# Patient Record
Sex: FEMALE | Race: WHITE | NOT HISPANIC OR LATINO | Employment: OTHER | URBAN - NONMETROPOLITAN AREA
[De-identification: names, ages, dates, MRNs, and addresses within clinical notes are randomized per-mention and may not be internally consistent; named-entity substitution may affect disease eponyms.]

---

## 2017-01-17 ENCOUNTER — OFFICE VISIT (OUTPATIENT)
Dept: OBSTETRICS AND GYNECOLOGY | Facility: CLINIC | Age: 62
End: 2017-01-17

## 2017-01-17 VITALS
SYSTOLIC BLOOD PRESSURE: 126 MMHG | DIASTOLIC BLOOD PRESSURE: 72 MMHG | BODY MASS INDEX: 23.39 KG/M2 | HEIGHT: 64 IN | WEIGHT: 137 LBS

## 2017-01-17 DIAGNOSIS — N89.8 VAGINAL DRYNESS: ICD-10-CM

## 2017-01-17 DIAGNOSIS — Z01.419 WELL WOMAN EXAM WITH ROUTINE GYNECOLOGICAL EXAM: Primary | ICD-10-CM

## 2017-01-17 DIAGNOSIS — C50.912 MALIGNANT NEOPLASM OF LEFT FEMALE BREAST, UNSPECIFIED SITE OF BREAST: ICD-10-CM

## 2017-01-17 DIAGNOSIS — Z13.79 OTHER GENETIC SCREENING: ICD-10-CM

## 2017-01-17 PROCEDURE — 99396 PREV VISIT EST AGE 40-64: CPT | Performed by: NURSE PRACTITIONER

## 2017-01-17 PROCEDURE — G0123 SCREEN CERV/VAG THIN LAYER: HCPCS | Performed by: NURSE PRACTITIONER

## 2017-01-17 RX ORDER — PANTOPRAZOLE SODIUM 20 MG/1
20 TABLET, DELAYED RELEASE ORAL DAILY
COMMUNITY
End: 2017-03-07 | Stop reason: SDUPTHER

## 2017-01-17 RX ORDER — THIAMINE MONONITRATE (VIT B1) 100 MG
100 TABLET ORAL DAILY
COMMUNITY
End: 2018-01-06

## 2017-01-17 NOTE — PROGRESS NOTES
Subjective   Evelia Kerns is a 61 y.o. female     HPI Comments: Here for yearly. Has had breast cancer. Having vaginal dryness.    Gynecologic Exam   The patient's pertinent negatives include no pelvic pain, vaginal bleeding or vaginal discharge. The patient is experiencing no pain. She is not pregnant. Pertinent negatives include no abdominal pain, anorexia, back pain, chills, constipation, diarrhea, discolored urine, dysuria, fever, flank pain, frequency, headaches, hematuria, joint pain, joint swelling, nausea, painful intercourse, rash, sore throat, urgency or vomiting. Nothing aggravates the symptoms. She is postmenopausal.       The following portions of the patient's history were reviewed and updated as appropriate: allergies, current medications, past family history, past medical history, past social history, past surgical history and problem list.    Review of Systems   Constitutional: Negative for activity change, appetite change, chills, diaphoresis, fatigue, fever and unexpected weight change.   HENT: Negative for congestion, ear discharge, ear pain, facial swelling, hearing loss, mouth sores, nosebleeds, postnasal drip, rhinorrhea, sinus pressure, sneezing, sore throat, tinnitus, trouble swallowing and voice change.    Eyes: Negative for photophobia, pain, discharge, redness, itching and visual disturbance.   Respiratory: Negative for apnea, cough, choking, chest tightness and shortness of breath.    Cardiovascular: Negative for chest pain, palpitations and leg swelling.   Gastrointestinal: Negative for abdominal distention, abdominal pain, anal bleeding, anorexia, blood in stool, constipation, diarrhea, nausea, rectal pain and vomiting.   Endocrine: Negative for cold intolerance and heat intolerance.   Genitourinary: Negative for decreased urine volume, difficulty urinating, dyspareunia, dysuria, flank pain, frequency, genital sores, hematuria, menstrual problem, pelvic pain, urgency, vaginal  bleeding, vaginal discharge and vaginal pain.   Musculoskeletal: Negative for arthralgias, back pain, joint pain, joint swelling and myalgias.   Skin: Negative for color change and rash.   Allergic/Immunologic: Negative for environmental allergies.   Neurological: Negative for dizziness, syncope, weakness, numbness and headaches.   Hematological: Negative for adenopathy.   Psychiatric/Behavioral: Negative for agitation, confusion and sleep disturbance. The patient is not nervous/anxious.        Objective   Physical Exam   Constitutional: She is oriented to person, place, and time. She appears well-developed and well-nourished. No distress.   HENT:   Head: Normocephalic.   Right Ear: External ear normal.   Left Ear: External ear normal.   Nose: Nose normal.   Mouth/Throat: Oropharynx is clear and moist.   Eyes: Conjunctivae are normal. Right eye exhibits no discharge. Left eye exhibits no discharge.   Neck: Normal range of motion. Neck supple. Carotid bruit is not present. No tracheal deviation present. No thyromegaly present.   Cardiovascular: Normal rate, regular rhythm, normal heart sounds and intact distal pulses.    No murmur heard.  Pulmonary/Chest: Effort normal and breath sounds normal. No respiratory distress. She has no wheezes. Right breast exhibits no inverted nipple, no mass, no nipple discharge, no skin change and no tenderness. Left breast exhibits no inverted nipple, no mass, no nipple discharge, no skin change and no tenderness. Breasts are symmetrical. There is no breast swelling.   Abdominal: Soft. She exhibits no distension and no mass. There is no tenderness. There is no guarding. No hernia. Hernia confirmed negative in the right inguinal area and confirmed negative in the left inguinal area.   Genitourinary: Rectum normal, vagina normal and uterus normal. Rectal exam shows no external hemorrhoid, no internal hemorrhoid, no fissure, no mass, no tenderness and anal tone normal. No breast  tenderness, discharge or bleeding. Pelvic exam was performed with patient supine. There is no rash, tenderness, lesion or injury on the right labia. There is no rash, tenderness, lesion or injury on the left labia. Uterus is not enlarged, not fixed and not tender. Cervix exhibits no motion tenderness, no discharge and no friability. Right adnexum displays no mass, no tenderness and no fullness. Left adnexum displays no mass, no tenderness and no fullness. No bleeding in the vagina. No vaginal discharge found.   Genitourinary Comments:   BSU normal  Urethral meatus  Normal  Perineum  Normal   Musculoskeletal: Normal range of motion. She exhibits no edema or tenderness.   Lymphadenopathy:        Head (right side): No submental, no submandibular, no tonsillar, no preauricular, no posterior auricular and no occipital adenopathy present.        Head (left side): No submental, no submandibular, no tonsillar, no preauricular, no posterior auricular and no occipital adenopathy present.     She has no cervical adenopathy.        Right cervical: No superficial cervical, no deep cervical and no posterior cervical adenopathy present.       Left cervical: No superficial cervical, no deep cervical and no posterior cervical adenopathy present.     She has no axillary adenopathy.        Right: No inguinal adenopathy present.        Left: No inguinal adenopathy present.   Neurological: She is alert and oriented to person, place, and time. Coordination normal.   Skin: Skin is warm and dry. No bruising and no rash noted. She is not diaphoretic. No erythema.   Psychiatric: She has a normal mood and affect. Her behavior is normal. Judgment and thought content normal.   Nursing note and vitals reviewed.        Assessment/Plan   Evelia was seen today for gynecologic exam.    Diagnoses and all orders for this visit:    Well woman exam with routine gynecological exam  Comments:  Normal GYN exam. Mammo at [facility].  Orders:  -      Liquid-based Pap Smear, Screening  -     Occult Blood, Fecal By Immunoassay    Malignant neoplasm of left female breast, unspecified site of breast  Comments:  Is followed by Dr. Rodriguez.    Vaginal dryness  Comments:  Encouraged to use Coconut Oil on a daily basis.    Other genetic screening  Comments:  Had a positive My Risk test for colon cancer. Has had recent colonoscopy and tumor markers, that were negative.

## 2017-01-17 NOTE — MR AVS SNAPSHOT
Evelia Menayury   2017 1:30 PM   Office Visit    Dept Phone:  [phone]   Encounter #:  [id number]    Provider:  NATO Villanueva   Department:  [facility]                Your Full Care Plan              Your Updated Medication List          This list is accurate as of: 17  2:37 PM.  Always use your most recent med list.                pantoprazole 20 MG EC tablet   Commonly known as:  PROTONIX       thiamine 100 MG tablet   Commonly known as:  VITAMIN B-1               We Performed the Following     Liquid-based Pap Smear, Screening     Occult Blood, Fecal By Immunoassay       You Were Diagnosed With        Codes Comments    Well woman exam with routine gynecological exam    -  Primary ICD-10-CM: Z01.419  ICD-9-CM: V72.31     Malignant neoplasm of left female breast, unspecified site of breast     ICD-10-CM: C50.912  ICD-9-CM: 174.9     Vaginal dryness     ICD-10-CM: N89.8  ICD-9-CM: 625.8       Instructions     None    Patient Instructions History      Upcoming Appointments     Visit Type Date Time Department    PHYSICAL 2017  1:30 PM [facility]    MAMMO [facility] DI[facility] BILATERAL 2017 10:20 AM  [facility]      MyChart Signup     [facility] allows you to send messages to your doctor, view your test results, renew your prescriptions, schedule appointments, and more. To sign up, go to PatientsLikeMe and click on the Sign Up Now link in the New User? box. Enter your [employer] Activation Code exactly as it appears below along with the last four digits of your Social Security Number and your Date of Birth () to complete the sign-up process. If you do not sign up before the expiration date, you must request a new code.    [employer] Activation Code: [email or web address]  Expires: 2017  2:37 PM    If you have questions, you can email [email or web address] or call [phone] to talk to our [employer] staff.  "Remember, MyChart is NOT to be used for urgent needs. For medical emergencies, dial 911.               Other Info from Your Visit           Your Appointments     Jan 30, 2017 10:20 AM CST   Mammo pad  diag bilateral with [facility] MAMM 1   [facility] ([place])    [address] [address] [phone]           Arrive 30 minutes prior to exam time. Do not apply deodorant or talcum/body powders prior to the exam on the appointment date. Must bring previous Mammography images/reports if not taken at [facility]. Can take all medications as directed.            Jan 19, 2018  8:00 AM CST   Physical with NATO Villanueva   [facility] (--)    [address] [place] [id number] [phone]           Arrive 15 minutes prior to appointment.              Allergies     No Known Allergies      Reason for Visit     Gynecologic Exam Pt here for yearly, c/o vaginal dryness. She has been using coconut oil.      Vital Signs     Blood Pressure Height Weight Body Mass Index Smoking Status       126/72 64\" (162.6 cm) 137 lb (62.1 kg) 23.52 kg/m2 Former Smoker       Problems and Diagnoses Noted     Well woman exam with routine gynecological exam    -  Primary    Breast cancer        Vaginal dryness            "

## 2017-01-19 LAB
GEN CATEG CVX/VAG CYTO-IMP: NORMAL
HEMOCCULT STL QL IA: NEGATIVE
HPV REFLEX?: NORMAL
LAB AP CASE REPORT: NORMAL
LAB AP GYN OTHER FINDINGS: NORMAL
Lab: NORMAL
PATH INTERP SPEC-IMP: NORMAL
STAT OF ADQ CVX/VAG CYTO-IMP: NORMAL

## 2017-01-30 ENCOUNTER — APPOINTMENT (OUTPATIENT)
Dept: LAB | Facility: HOSPITAL | Age: 62
End: 2017-01-30
Attending: INTERNAL MEDICINE

## 2017-01-30 ENCOUNTER — HOSPITAL ENCOUNTER (OUTPATIENT)
Dept: MAMMOGRAPHY | Facility: HOSPITAL | Age: 62
Discharge: HOME | End: 2017-01-30
Attending: INTERNAL MEDICINE | Admitting: INTERNAL MEDICINE

## 2017-01-30 ENCOUNTER — TRANSCRIBE ORDERS (OUTPATIENT)
Dept: GENERAL RADIOLOGY | Facility: HOSPITAL | Age: 62
End: 2017-01-30

## 2017-01-30 DIAGNOSIS — C50.919 MALIGNANT NEOPLASM OF FEMALE BREAST, UNSPECIFIED LATERALITY, UNSPECIFIED SITE OF BREAST: Primary | ICD-10-CM

## 2017-01-30 DIAGNOSIS — Z85.3 HX: BREAST CANCER: ICD-10-CM

## 2017-01-30 LAB
ALBUMIN SERPL-MCNC: 4.3 G/DL (ref 3.5–5)
ALBUMIN/GLOB SERPL: 1.5 G/DL (ref 1.1–2.5)
ALP SERPL-CCNC: 94 U/L (ref 24–120)
ALT SERPL W P-5'-P-CCNC: 40 U/L (ref 0–54)
ANION GAP SERPL CALCULATED.3IONS-SCNC: 9 MMOL/L (ref 4–13)
AST SERPL-CCNC: 25 U/L (ref 7–45)
BASOPHILS # BLD AUTO: 0.08 10*3/MM3 (ref 0–0.2)
BASOPHILS NFR BLD AUTO: 1.4 % (ref 0–2)
BILIRUB SERPL-MCNC: 0.5 MG/DL (ref 0.1–1)
BUN BLD-MCNC: 10 MG/DL (ref 5–21)
BUN/CREAT SERPL: 14.7 (ref 7–25)
CALCIUM SPEC-SCNC: 9.6 MG/DL (ref 8.4–10.4)
CEA SERPL-MCNC: 1.93 NG/ML (ref 0–5)
CHLORIDE SERPL-SCNC: 101 MMOL/L (ref 98–110)
CO2 SERPL-SCNC: 26 MMOL/L (ref 24–31)
CREAT BLD-MCNC: 0.68 MG/DL (ref 0.5–1.4)
DEPRECATED RDW RBC AUTO: 42.2 FL (ref 40–54)
EOSINOPHIL # BLD AUTO: 0.13 10*3/MM3 (ref 0–0.7)
EOSINOPHIL NFR BLD AUTO: 2.3 % (ref 0–4)
ERYTHROCYTE [DISTWIDTH] IN BLOOD BY AUTOMATED COUNT: 12.6 % (ref 12–15)
GFR SERPL CREATININE-BSD FRML MDRD: 88 ML/MIN/1.73
GLOBULIN UR ELPH-MCNC: 2.9 GM/DL
GLUCOSE BLD-MCNC: 84 MG/DL (ref 70–100)
HCT VFR BLD AUTO: 40.5 % (ref 37–47)
HGB BLD-MCNC: 13.4 G/DL (ref 12–16)
IMM GRANULOCYTES # BLD: 0.01 10*3/MM3 (ref 0–0.03)
IMM GRANULOCYTES NFR BLD: 0.2 % (ref 0–5)
LYMPHOCYTES # BLD AUTO: 2.08 10*3/MM3 (ref 0.72–4.86)
LYMPHOCYTES NFR BLD AUTO: 37.3 % (ref 15–45)
MCH RBC QN AUTO: 30.3 PG (ref 28–32)
MCHC RBC AUTO-ENTMCNC: 33.1 G/DL (ref 33–36)
MCV RBC AUTO: 91.6 FL (ref 82–98)
MONOCYTES # BLD AUTO: 0.48 10*3/MM3 (ref 0.19–1.3)
MONOCYTES NFR BLD AUTO: 8.6 % (ref 4–12)
NEUTROPHILS # BLD AUTO: 2.8 10*3/MM3 (ref 1.87–8.4)
NEUTROPHILS NFR BLD AUTO: 50.2 % (ref 39–78)
PLATELET # BLD AUTO: 205 10*3/MM3 (ref 130–400)
PMV BLD AUTO: 12.1 FL (ref 6–12)
POTASSIUM BLD-SCNC: 3.9 MMOL/L (ref 3.5–5.3)
PROT SERPL-MCNC: 7.2 G/DL (ref 6.3–8.7)
RBC # BLD AUTO: 4.42 10*6/MM3 (ref 4.2–5.4)
SODIUM BLD-SCNC: 136 MMOL/L (ref 135–145)
WBC NRBC COR # BLD: 5.58 10*3/MM3 (ref 4.8–10.8)

## 2017-01-30 PROCEDURE — 86300 IMMUNOASSAY TUMOR CA 15-3: CPT | Performed by: INTERNAL MEDICINE

## 2017-01-30 PROCEDURE — 85025 COMPLETE CBC W/AUTO DIFF WBC: CPT | Performed by: INTERNAL MEDICINE

## 2017-01-30 PROCEDURE — 82378 CARCINOEMBRYONIC ANTIGEN: CPT | Performed by: INTERNAL MEDICINE

## 2017-01-30 PROCEDURE — 36415 COLL VENOUS BLD VENIPUNCTURE: CPT | Performed by: INTERNAL MEDICINE

## 2017-01-30 PROCEDURE — G0279 TOMOSYNTHESIS, MAMMO: HCPCS

## 2017-01-30 PROCEDURE — G0204 DX MAMMO INCL CAD BI: HCPCS

## 2017-01-30 PROCEDURE — 80053 COMPREHEN METABOLIC PANEL: CPT | Performed by: INTERNAL MEDICINE

## 2017-02-01 LAB — CANCER AG27-29 SERPL-ACNC: 11.4 U/ML (ref 0–38.6)

## 2017-03-07 RX ORDER — PANTOPRAZOLE SODIUM 20 MG/1
20 TABLET, DELAYED RELEASE ORAL DAILY
Qty: 30 TABLET | Refills: 6 | Status: SHIPPED | OUTPATIENT
Start: 2017-03-07 | End: 2017-03-08 | Stop reason: DRUGHIGH

## 2017-03-08 DIAGNOSIS — K21.9 GASTROESOPHAGEAL REFLUX DISEASE WITHOUT ESOPHAGITIS: Primary | ICD-10-CM

## 2017-03-08 RX ORDER — PANTOPRAZOLE SODIUM 40 MG/1
40 TABLET, DELAYED RELEASE ORAL DAILY
Qty: 30 TABLET | Refills: 11 | Status: SHIPPED | OUTPATIENT
Start: 2017-03-08 | End: 2017-06-28 | Stop reason: SDUPTHER

## 2017-06-28 DIAGNOSIS — K21.9 GASTROESOPHAGEAL REFLUX DISEASE WITHOUT ESOPHAGITIS: ICD-10-CM

## 2017-06-28 RX ORDER — PANTOPRAZOLE SODIUM 40 MG/1
40 TABLET, DELAYED RELEASE ORAL DAILY
Qty: 90 TABLET | Refills: 3 | Status: SHIPPED | OUTPATIENT
Start: 2017-06-28 | End: 2019-02-14

## 2017-09-01 ENCOUNTER — TRANSCRIBE ORDERS (OUTPATIENT)
Dept: ADMINISTRATIVE | Facility: HOSPITAL | Age: 62
End: 2017-09-01

## 2017-09-01 ENCOUNTER — APPOINTMENT (OUTPATIENT)
Dept: LAB | Facility: HOSPITAL | Age: 62
End: 2017-09-01
Attending: INTERNAL MEDICINE

## 2017-09-01 DIAGNOSIS — Z85.3 PERSONAL HISTORY OF BREAST CANCER: Primary | ICD-10-CM

## 2017-09-01 LAB
ALBUMIN SERPL-MCNC: 4.4 G/DL (ref 3.5–5)
ALBUMIN/GLOB SERPL: 1.9 G/DL (ref 1.1–2.5)
ALP SERPL-CCNC: 68 U/L (ref 24–120)
ALT SERPL W P-5'-P-CCNC: 26 U/L (ref 0–54)
ANION GAP SERPL CALCULATED.3IONS-SCNC: 7 MMOL/L (ref 4–13)
AST SERPL-CCNC: 22 U/L (ref 7–45)
BASOPHILS # BLD AUTO: 0.05 10*3/MM3 (ref 0–0.2)
BASOPHILS NFR BLD AUTO: 1.1 % (ref 0–2)
BILIRUB SERPL-MCNC: 0.5 MG/DL (ref 0.1–1)
BUN BLD-MCNC: 20 MG/DL (ref 5–21)
BUN/CREAT SERPL: 27.4 (ref 7–25)
CALCIUM SPEC-SCNC: 9.1 MG/DL (ref 8.4–10.4)
CEA SERPL-MCNC: 2.31 NG/ML (ref 0–5)
CHLORIDE SERPL-SCNC: 102 MMOL/L (ref 98–110)
CO2 SERPL-SCNC: 27 MMOL/L (ref 24–31)
CREAT BLD-MCNC: 0.73 MG/DL (ref 0.5–1.4)
DEPRECATED RDW RBC AUTO: 41.4 FL (ref 40–54)
EOSINOPHIL # BLD AUTO: 0.08 10*3/MM3 (ref 0–0.7)
EOSINOPHIL NFR BLD AUTO: 1.8 % (ref 0–4)
ERYTHROCYTE [DISTWIDTH] IN BLOOD BY AUTOMATED COUNT: 12.4 % (ref 12–15)
GFR SERPL CREATININE-BSD FRML MDRD: 81 ML/MIN/1.73
GLOBULIN UR ELPH-MCNC: 2.3 GM/DL
GLUCOSE BLD-MCNC: 97 MG/DL (ref 70–100)
HCT VFR BLD AUTO: 41.3 % (ref 37–47)
HGB BLD-MCNC: 13.8 G/DL (ref 12–16)
IMM GRANULOCYTES # BLD: 0 10*3/MM3 (ref 0–0.03)
IMM GRANULOCYTES NFR BLD: 0 % (ref 0–5)
LYMPHOCYTES # BLD AUTO: 1.46 10*3/MM3 (ref 0.72–4.86)
LYMPHOCYTES NFR BLD AUTO: 32.7 % (ref 15–45)
MCH RBC QN AUTO: 30.6 PG (ref 28–32)
MCHC RBC AUTO-ENTMCNC: 33.4 G/DL (ref 33–36)
MCV RBC AUTO: 91.6 FL (ref 82–98)
MONOCYTES # BLD AUTO: 0.4 10*3/MM3 (ref 0.19–1.3)
MONOCYTES NFR BLD AUTO: 8.9 % (ref 4–12)
NEUTROPHILS # BLD AUTO: 2.48 10*3/MM3 (ref 1.87–8.4)
NEUTROPHILS NFR BLD AUTO: 55.5 % (ref 39–78)
PLATELET # BLD AUTO: 213 10*3/MM3 (ref 130–400)
PMV BLD AUTO: 11.6 FL (ref 6–12)
POTASSIUM BLD-SCNC: 4.2 MMOL/L (ref 3.5–5.3)
PROT SERPL-MCNC: 6.7 G/DL (ref 6.3–8.7)
RBC # BLD AUTO: 4.51 10*6/MM3 (ref 4.2–5.4)
SODIUM BLD-SCNC: 136 MMOL/L (ref 135–145)
WBC NRBC COR # BLD: 4.47 10*3/MM3 (ref 4.8–10.8)

## 2017-09-01 PROCEDURE — 36415 COLL VENOUS BLD VENIPUNCTURE: CPT

## 2017-09-01 PROCEDURE — 86300 IMMUNOASSAY TUMOR CA 15-3: CPT | Performed by: INTERNAL MEDICINE

## 2017-09-01 PROCEDURE — 82378 CARCINOEMBRYONIC ANTIGEN: CPT | Performed by: INTERNAL MEDICINE

## 2017-09-01 PROCEDURE — 85025 COMPLETE CBC W/AUTO DIFF WBC: CPT | Performed by: INTERNAL MEDICINE

## 2017-09-01 PROCEDURE — 80053 COMPREHEN METABOLIC PANEL: CPT | Performed by: INTERNAL MEDICINE

## 2017-09-02 LAB — CANCER AG27-29 SERPL-ACNC: 17.2 U/ML (ref 0–38.6)

## 2017-09-21 ENCOUNTER — TRANSCRIBE ORDERS (OUTPATIENT)
Dept: ADMINISTRATIVE | Facility: HOSPITAL | Age: 62
End: 2017-09-21

## 2017-09-21 DIAGNOSIS — Z85.3 HISTORY OF BREAST CANCER: Primary | ICD-10-CM

## 2018-01-19 ENCOUNTER — OFFICE VISIT (OUTPATIENT)
Dept: OBSTETRICS AND GYNECOLOGY | Facility: CLINIC | Age: 63
End: 2018-01-19

## 2018-01-19 VITALS
BODY MASS INDEX: 23.73 KG/M2 | SYSTOLIC BLOOD PRESSURE: 122 MMHG | WEIGHT: 139 LBS | DIASTOLIC BLOOD PRESSURE: 76 MMHG | HEIGHT: 64 IN

## 2018-01-19 DIAGNOSIS — Z85.3 HISTORY OF BREAST CANCER: ICD-10-CM

## 2018-01-19 DIAGNOSIS — Z01.419 WELL WOMAN EXAM WITH ROUTINE GYNECOLOGICAL EXAM: Primary | ICD-10-CM

## 2018-01-19 DIAGNOSIS — N89.8 VAGINAL DRYNESS: ICD-10-CM

## 2018-01-19 DIAGNOSIS — M85.80 OSTEOPENIA, UNSPECIFIED LOCATION: ICD-10-CM

## 2018-01-19 PROCEDURE — 99396 PREV VISIT EST AGE 40-64: CPT | Performed by: NURSE PRACTITIONER

## 2018-01-19 PROCEDURE — G0123 SCREEN CERV/VAG THIN LAYER: HCPCS | Performed by: NURSE PRACTITIONER

## 2018-01-19 RX ORDER — MULTIPLE VITAMINS W/ MINERALS TAB 9MG-400MCG
1 TAB ORAL DAILY
COMMUNITY
End: 2019-02-14

## 2018-01-19 RX ORDER — PHENOL 1.4 %
600 AEROSOL, SPRAY (ML) MUCOUS MEMBRANE DAILY
COMMUNITY
End: 2019-02-14

## 2018-01-19 NOTE — PROGRESS NOTES
"Subjective     Evelia Kerns is a 62 y.o. female    HPI Comments: Here for yearly check up. Has no complaints.    Gynecologic Exam   The patient's pertinent negatives include no pelvic pain, vaginal bleeding or vaginal discharge. The patient is experiencing no pain. Pertinent negatives include no abdominal pain, anorexia, back pain, chills, constipation, diarrhea, discolored urine, dysuria, fever, flank pain, frequency, headaches, hematuria, joint pain, joint swelling, nausea, painful intercourse, rash, sore throat, urgency or vomiting. She is not sexually active. She is postmenopausal.         /76  Ht 162.6 cm (64\")  Wt 63 kg (139 lb)  LMP 01/19/2010 (Approximate)  BMI 23.86 kg/m2    Outpatient Encounter Prescriptions as of 1/19/2018   Medication Sig Dispense Refill   • calcium carbonate (OS-JANNETTE) 600 MG tablet Take 600 mg by mouth Daily.     • fluticasone (FLONASE) 50 MCG/ACT nasal spray 2 sprays into each nostril Daily.     • Loratadine (CLARITIN PO) Take  by mouth.     • Magnesium 100 MG tablet Take  by mouth.     • Multiple Vitamins-Minerals (MULTIVITAMIN WITH MINERALS) tablet tablet Take 1 tablet by mouth Daily.     • pantoprazole (PROTONIX) 40 MG EC tablet Take 1 tablet by mouth Daily. 90 tablet 3   • UNKNOWN TO PATIENT Magnesium/calcium combo liq OTC       No facility-administered encounter medications on file as of 1/19/2018.        Surgical History  Past Surgical History:   Procedure Laterality Date   • ADENOIDECTOMY     • BREAST BIOPSY Left 2005   • BREAST LUMPECTOMY      2005   • COLONOSCOPY  2016   • KNEE ARTHROPLASTY     • TONSILLECTOMY         Family History  Family History   Problem Relation Age of Onset   • Stroke Father    • Leukemia Mother    • No Known Problems Brother    • No Known Problems Sister    • No Known Problems Sister    • Breast cancer Paternal Aunt 60   • No Known Problems Daughter    • No Known Problems Son    • No Known Problems Maternal Grandmother    • No Known Problems " Paternal Grandmother    • No Known Problems Maternal Aunt    • Breast cancer Other 48   • Ovarian cancer Neg Hx    • Uterine cancer Neg Hx    • Colon cancer Neg Hx    • Melanoma Neg Hx    • Prostate cancer Neg Hx    • BRCA 1/2 Neg Hx    • Endometrial cancer Neg Hx        The following portions of the patient's history were reviewed and updated as appropriate: allergies, current medications, past family history, past medical history, past social history, past surgical history and problem list.    Review of Systems   Constitutional: Negative for activity change, appetite change, chills, diaphoresis, fatigue, fever and unexpected weight change.   HENT: Negative for congestion, dental problem, drooling, ear discharge, ear pain, facial swelling, hearing loss, mouth sores, nosebleeds, postnasal drip, rhinorrhea, sinus pain, sinus pressure, sneezing, sore throat, tinnitus, trouble swallowing and voice change.    Eyes: Negative for photophobia, pain, discharge, redness, itching and visual disturbance.   Respiratory: Negative for apnea, cough, choking, chest tightness, shortness of breath, wheezing and stridor.    Cardiovascular: Negative for chest pain, palpitations and leg swelling.   Gastrointestinal: Negative for abdominal distention, abdominal pain, anal bleeding, anorexia, blood in stool, constipation, diarrhea, nausea, rectal pain and vomiting.   Endocrine: Negative for cold intolerance, heat intolerance, polydipsia, polyphagia and polyuria.   Genitourinary: Negative for decreased urine volume, difficulty urinating, dyspareunia, dysuria, enuresis, flank pain, frequency, genital sores, hematuria, menstrual problem, pelvic pain, urgency, vaginal bleeding, vaginal discharge and vaginal pain.   Musculoskeletal: Negative for arthralgias, back pain, gait problem, joint pain, joint swelling, myalgias, neck pain and neck stiffness.   Skin: Negative for color change, pallor, rash and wound.   Allergic/Immunologic: Negative for  environmental allergies, food allergies and immunocompromised state.   Neurological: Negative for dizziness, tremors, seizures, syncope, facial asymmetry, speech difficulty, weakness, light-headedness, numbness and headaches.   Hematological: Negative for adenopathy. Does not bruise/bleed easily.   Psychiatric/Behavioral: Negative for agitation, behavioral problems, confusion, decreased concentration, dysphoric mood, hallucinations, self-injury, sleep disturbance and suicidal ideas. The patient is not nervous/anxious and is not hyperactive.        Objective   Physical Exam   Constitutional: She is oriented to person, place, and time. She appears well-developed and well-nourished. No distress.   HENT:   Head: Normocephalic.   Right Ear: External ear normal.   Left Ear: External ear normal.   Nose: Nose normal.   Mouth/Throat: Oropharynx is clear and moist.   Eyes: Conjunctivae are normal. Right eye exhibits no discharge. Left eye exhibits no discharge. No scleral icterus.   Neck: Normal range of motion. Neck supple. Carotid bruit is not present. No tracheal deviation present. No thyromegaly present.   Cardiovascular: Normal rate, regular rhythm, normal heart sounds and intact distal pulses.    No murmur heard.  Pulmonary/Chest: Effort normal and breath sounds normal. No respiratory distress. She has no wheezes. Right breast exhibits no inverted nipple, no mass, no nipple discharge, no skin change and no tenderness. Left breast exhibits no inverted nipple, no mass, no nipple discharge, no skin change and no tenderness. Breasts are symmetrical. There is no breast swelling.       Abdominal: Soft. She exhibits no distension and no mass. There is no tenderness. There is no guarding. No hernia. Hernia confirmed negative in the right inguinal area and confirmed negative in the left inguinal area.   Genitourinary: Rectum normal, vagina normal and uterus normal. Rectal exam shows no mass. No breast tenderness, discharge or  bleeding. Pelvic exam was performed with patient supine. There is no rash, tenderness, lesion or injury on the right labia. There is no rash, tenderness, lesion or injury on the left labia. Uterus is not enlarged, not fixed and not tender. Cervix exhibits no motion tenderness, no discharge and no friability. Right adnexum displays no mass, no tenderness and no fullness. Left adnexum displays no mass, no tenderness and no fullness. No erythema, tenderness or bleeding in the vagina. No foreign body in the vagina. No signs of injury around the vagina. No vaginal discharge found.   Genitourinary Comments:   BSU normal  Urethral meatus  Normal  Perineum  Normal  Vaginal vault is atrophic   Musculoskeletal: Normal range of motion. She exhibits no edema or tenderness.   Lymphadenopathy:        Head (right side): No submental, no submandibular, no tonsillar, no preauricular, no posterior auricular and no occipital adenopathy present.        Head (left side): No submental, no submandibular, no tonsillar, no preauricular, no posterior auricular and no occipital adenopathy present.     She has no cervical adenopathy.        Right cervical: No superficial cervical, no deep cervical and no posterior cervical adenopathy present.       Left cervical: No superficial cervical, no deep cervical and no posterior cervical adenopathy present.     She has no axillary adenopathy.        Right: No inguinal adenopathy present.        Left: No inguinal adenopathy present.   Neurological: She is alert and oriented to person, place, and time. Coordination normal.   Skin: Skin is warm and dry. No bruising and no rash noted. She is not diaphoretic. No erythema.   Psychiatric: She has a normal mood and affect. Her behavior is normal. Judgment and thought content normal.   Nursing note and vitals reviewed.      Assessment/Plan   Evelia was seen today for gynecologic exam.    Diagnoses and all orders for this visit:    Well woman exam with routine  gynecological exam  Normal GYN exam. Will RTO for lab work. Encouraged SBE, pt is aware how to do self breast exam and the importance of same. Discussed weight management and importance of maintaining a healthy weight. Discussed Vitamin D intake and the importance of adequate vitamin D for both Bone Health and a healthy immune system.  Discussed Daily exercise and the importance of same, in regards to a healthy heart as well as helping to maintain her weight and improving her mental health.  BMI 23.8.  Colonoscopy is up to date.  Mammogram and Bone Density will be scheduled at [facility].  Pap smear is done per [facility]P guidelines.    -     Liquid-based Pap Smear, Screening - ThinPrep Vial, Cervix  -     CBC & Differential  -     Comprehensive Metabolic Panel  -     TSH  -     Lipid Panel With LDL / HDL Ratio  -     T3, Uptake  -     Vitamin D 25 Hydroxy  -     T4, Free  -     Hemoglobin A1c  -     UA / M With / Rflx Culture([facility] ONLY) - Urine, Clean Catch  -     Occult Blood, Fecal By Immunoassay - Stool, Per Rectum    Vaginal dryness  Encouraged to use Coconut Oil daily.    History of breast cancer  Pt had Breast cancer in 2005    Osteopenia, unspecified location  Will have BD at [facility].  -     DEXA Bone Density Axial; Future     Other genetic screening  Comments:  Had a positive My Risk test for colon cancer. Has had recent colonoscopy and tumor markers, that were negative. Fit test done today.

## 2018-01-21 LAB — HEMOCCULT STL QL IA: NEGATIVE

## 2018-01-27 LAB
GEN CATEG CVX/VAG CYTO-IMP: NORMAL
LAB AP CASE REPORT: NORMAL
LAB AP GYN ADDITIONAL INFORMATION: NORMAL
Lab: NORMAL
PATH INTERP SPEC-IMP: NORMAL
STAT OF ADQ CVX/VAG CYTO-IMP: NORMAL

## 2018-01-31 ENCOUNTER — TELEPHONE (OUTPATIENT)
Dept: OBSTETRICS AND GYNECOLOGY | Facility: CLINIC | Age: 63
End: 2018-01-31

## 2018-01-31 ENCOUNTER — HOSPITAL ENCOUNTER (OUTPATIENT)
Dept: MAMMOGRAPHY | Facility: HOSPITAL | Age: 63
Discharge: HOME | End: 2018-01-31
Attending: INTERNAL MEDICINE | Admitting: INTERNAL MEDICINE

## 2018-01-31 DIAGNOSIS — Z85.3 HISTORY OF BREAST CANCER: ICD-10-CM

## 2018-01-31 PROCEDURE — 77067 SCR MAMMO BI INCL CAD: CPT

## 2018-01-31 PROCEDURE — 77063 BREAST TOMOSYNTHESIS BI: CPT

## 2018-01-31 NOTE — TELEPHONE ENCOUNTER
----- Message from NATO Villanueva sent at 1/29/2018  1:00 PM CST -----  Can you let her know her pap did not have enough cells for them to see anything due to dryness. Use Coconut oil daily for 1 month and then repeat pap. TRC

## 2018-02-01 ENCOUNTER — TRANSCRIBE ORDERS (OUTPATIENT)
Dept: ADMINISTRATIVE | Facility: HOSPITAL | Age: 63
End: 2018-02-01

## 2018-02-01 DIAGNOSIS — R92.1 BREAST CALCIFICATIONS: Primary | ICD-10-CM

## 2018-02-05 ENCOUNTER — HOSPITAL ENCOUNTER (OUTPATIENT)
Dept: MAMMOGRAPHY | Facility: HOSPITAL | Age: 63
Discharge: HOME | End: 2018-02-05
Attending: INTERNAL MEDICINE | Admitting: INTERNAL MEDICINE

## 2018-02-05 DIAGNOSIS — R92.1 BREAST CALCIFICATIONS: ICD-10-CM

## 2018-02-05 PROCEDURE — 77065 DX MAMMO INCL CAD UNI: CPT

## 2018-02-05 PROCEDURE — G0279 TOMOSYNTHESIS, MAMMO: HCPCS

## 2018-02-09 ENCOUNTER — OFFICE VISIT (OUTPATIENT)
Dept: SURGERY | Age: 63
End: 2018-02-09
Payer: COMMERCIAL

## 2018-02-09 VITALS — DIASTOLIC BLOOD PRESSURE: 80 MMHG | SYSTOLIC BLOOD PRESSURE: 120 MMHG | HEART RATE: 100 BPM

## 2018-02-09 DIAGNOSIS — R92.0 MICROCALCIFICATION OF LEFT BREAST ON MAMMOGRAM: ICD-10-CM

## 2018-02-09 DIAGNOSIS — Z85.3 PERSONAL HISTORY OF MALIGNANT NEOPLASM OF BREAST: Primary | ICD-10-CM

## 2018-02-09 PROCEDURE — 99212 OFFICE O/P EST SF 10 MIN: CPT | Performed by: PHYSICIAN ASSISTANT

## 2018-02-09 RX ORDER — FLUTICASONE PROPIONATE 50 MCG
2 SPRAY, SUSPENSION (ML) NASAL
COMMUNITY
End: 2018-05-30 | Stop reason: ALTCHOICE

## 2018-02-09 RX ORDER — PHENOL 1.4 %
600 AEROSOL, SPRAY (ML) MUCOUS MEMBRANE
COMMUNITY
End: 2018-11-01

## 2018-02-09 RX ORDER — MULTIPLE VITAMINS W/ MINERALS TAB 9MG-400MCG
1 TAB ORAL
Status: ON HOLD | COMMUNITY
End: 2018-03-30

## 2018-02-09 RX ORDER — PANTOPRAZOLE SODIUM 40 MG/1
40 TABLET, DELAYED RELEASE ORAL DAILY
Status: ON HOLD | COMMUNITY
Start: 2017-06-28 | End: 2018-03-30

## 2018-02-15 ENCOUNTER — HOSPITAL ENCOUNTER (OUTPATIENT)
Dept: WOMENS IMAGING | Age: 63
Discharge: HOME | End: 2018-02-15
Payer: COMMERCIAL

## 2018-02-15 DIAGNOSIS — R92.1 BREAST CALCIFICATION, LEFT: ICD-10-CM

## 2018-02-15 PROCEDURE — 2720000001 MAM STEREO BREAST BX W LOC DEVICE ADDL LESION LEFT

## 2018-02-15 PROCEDURE — 88305 TISSUE EXAM BY PATHOLOGIST: CPT

## 2018-02-15 PROCEDURE — 77065 DX MAMMO INCL CAD UNI: CPT

## 2018-02-19 ENCOUNTER — TELEPHONE (OUTPATIENT)
Dept: SURGERY | Age: 63
End: 2018-02-19

## 2018-02-20 ENCOUNTER — TELEPHONE (OUTPATIENT)
Dept: SURGERY | Age: 63
End: 2018-02-20

## 2018-02-28 ENCOUNTER — OFFICE VISIT (OUTPATIENT)
Dept: OBSTETRICS AND GYNECOLOGY | Facility: CLINIC | Age: 63
End: 2018-02-28

## 2018-02-28 ENCOUNTER — DOCUMENTATION (OUTPATIENT)
Dept: OBSTETRICS AND GYNECOLOGY | Facility: CLINIC | Age: 63
End: 2018-02-28

## 2018-02-28 ENCOUNTER — HOSPITAL ENCOUNTER (OUTPATIENT)
Dept: BONE DENSITY | Facility: HOSPITAL | Age: 63
Discharge: HOME | End: 2018-02-28
Admitting: NURSE PRACTITIONER

## 2018-02-28 VITALS
DIASTOLIC BLOOD PRESSURE: 70 MMHG | WEIGHT: 142 LBS | SYSTOLIC BLOOD PRESSURE: 140 MMHG | BODY MASS INDEX: 24.24 KG/M2 | HEIGHT: 64 IN

## 2018-02-28 DIAGNOSIS — F41.9 ANXIETY: ICD-10-CM

## 2018-02-28 DIAGNOSIS — R92.8 ABNORMAL MAMMOGRAM: ICD-10-CM

## 2018-02-28 DIAGNOSIS — M85.80 OSTEOPENIA, UNSPECIFIED LOCATION: ICD-10-CM

## 2018-02-28 DIAGNOSIS — R87.615 PAP SMEAR OF CERVIX UNSATISFACTORY: Primary | ICD-10-CM

## 2018-02-28 PROCEDURE — 77080 DXA BONE DENSITY AXIAL: CPT

## 2018-02-28 PROCEDURE — G0123 SCREEN CERV/VAG THIN LAYER: HCPCS | Performed by: NURSE PRACTITIONER

## 2018-02-28 PROCEDURE — 99213 OFFICE O/P EST LOW 20 MIN: CPT | Performed by: NURSE PRACTITIONER

## 2018-02-28 RX ORDER — BUSPIRONE HYDROCHLORIDE 5 MG/1
5 TABLET ORAL 3 TIMES DAILY
Qty: 90 TABLET | Refills: 3 | Status: SHIPPED | OUTPATIENT
Start: 2018-02-28 | End: 2019-02-14

## 2018-02-28 NOTE — PROGRESS NOTES
"Subjective     Evelia Kerns is a 62 y.o. female    HPI Comments: Here for follow up of pap smear. Her last pap did not show any cells due to dryness. She has been using Coconut Oil  Daily.  She has also had an abnormal mammogram since she was here, and she has seen Dr. Alejandro. The BX shows atypical cells and he wants her to proceed with MRI and possible surgery. She is very anxious regarding that today.    Anxiety   Presents for initial visit. Onset was 1 to 4 weeks ago. The problem has been waxing and waning. Symptoms include excessive worry and nervous/anxious behavior. Patient reports no chest pain, compulsions, confusion, decreased concentration, depressed mood, dizziness, dry mouth, feeling of choking, hyperventilation, impotence, insomnia, irritability, malaise, muscle tension, nausea, obsessions, palpitations, panic, restlessness, shortness of breath or suicidal ideas. The severity of symptoms is moderate. The symptoms are aggravated by family issues. The quality of sleep is good.     There are no known risk factors.         /70 (BP Location: Right arm, Patient Position: Sitting, Cuff Size: Adult)  Ht 162.6 cm (64\")  Wt 64.4 kg (142 lb)  LMP 01/19/2010 (Approximate)  BMI 24.37 kg/m2    Outpatient Encounter Prescriptions as of 2/28/2018   Medication Sig Dispense Refill   • calcium carbonate (OS-JANNETTE) 600 MG tablet Take 600 mg by mouth Daily.     • fluticasone (FLONASE) 50 MCG/ACT nasal spray 2 sprays into each nostril Daily.     • Loratadine (CLARITIN PO) Take  by mouth.     • Magnesium 100 MG tablet Take  by mouth.     • Multiple Vitamins-Minerals (MULTIVITAMIN WITH MINERALS) tablet tablet Take 1 tablet by mouth Daily.     • pantoprazole (PROTONIX) 40 MG EC tablet Take 1 tablet by mouth Daily. 90 tablet 3   • UNKNOWN TO PATIENT Magnesium/calcium combo liq OTC     • busPIRone (BUSPAR) 5 MG tablet Take 1 tablet by mouth 3 (Three) Times a Day. 90 tablet 3     No facility-administered encounter " medications on file as of 2/28/2018.        Surgical History  Past Surgical History:   Procedure Laterality Date   • ADENOIDECTOMY     • BREAST BIOPSY Left 2005   • BREAST LUMPECTOMY      2005   • COLONOSCOPY  2016   • KNEE ARTHROPLASTY     • TONSILLECTOMY         Family History  Family History   Problem Relation Age of Onset   • Stroke Father    • Leukemia Mother    • No Known Problems Brother    • No Known Problems Sister    • No Known Problems Sister    • Breast cancer Paternal Aunt 60   • No Known Problems Daughter    • No Known Problems Son    • No Known Problems Maternal Grandmother    • No Known Problems Paternal Grandmother    • No Known Problems Maternal Aunt    • Breast cancer Other 48   • Ovarian cancer Neg Hx    • Uterine cancer Neg Hx    • Colon cancer Neg Hx    • Melanoma Neg Hx    • Prostate cancer Neg Hx    • BRCA 1/2 Neg Hx    • Endometrial cancer Neg Hx        The following portions of the patient's history were reviewed and updated as appropriate: allergies, current medications, past family history, past medical history, past social history, past surgical history and problem list.    Review of Systems   Constitutional: Negative for activity change, appetite change, chills, diaphoresis, fatigue, fever, irritability and unexpected weight change.   HENT: Negative for congestion, dental problem, drooling, ear discharge, ear pain, facial swelling, hearing loss, mouth sores, nosebleeds, postnasal drip, rhinorrhea, sinus pain, sinus pressure, sneezing, sore throat, tinnitus, trouble swallowing and voice change.    Eyes: Negative for photophobia, pain, discharge, redness, itching and visual disturbance.   Respiratory: Negative for apnea, cough, choking, chest tightness, shortness of breath, wheezing and stridor.    Cardiovascular: Negative for chest pain, palpitations and leg swelling.   Gastrointestinal: Negative for abdominal distention, abdominal pain, anal bleeding, blood in stool, constipation,  diarrhea, nausea, rectal pain and vomiting.   Endocrine: Negative for cold intolerance, heat intolerance, polydipsia, polyphagia and polyuria.   Genitourinary: Negative for decreased urine volume, difficulty urinating, dyspareunia, dysuria, enuresis, flank pain, frequency, genital sores, hematuria, impotence, menstrual problem, pelvic pain, urgency, vaginal bleeding, vaginal discharge and vaginal pain.   Musculoskeletal: Negative for arthralgias, back pain, gait problem, joint swelling, myalgias, neck pain and neck stiffness.   Skin: Negative for color change, pallor, rash and wound.   Allergic/Immunologic: Negative for environmental allergies, food allergies and immunocompromised state.   Neurological: Negative for dizziness, tremors, seizures, syncope, facial asymmetry, speech difficulty, weakness, light-headedness, numbness and headaches.   Hematological: Negative for adenopathy. Does not bruise/bleed easily.   Psychiatric/Behavioral: Negative for agitation, behavioral problems, confusion, decreased concentration, dysphoric mood, hallucinations, self-injury, sleep disturbance and suicidal ideas. The patient is nervous/anxious. The patient does not have insomnia and is not hyperactive.        Objective   Physical Exam   Constitutional: She is oriented to person, place, and time. She appears well-developed and well-nourished.   HENT:   Head: Normocephalic and atraumatic.   Abdominal: Soft. She exhibits no distension. There is no tenderness.   Genitourinary: Vagina normal and uterus normal. There is no rash, tenderness, lesion or injury on the right labia. There is no rash, tenderness, lesion or injury on the left labia. Uterus is not enlarged and not tender. Cervix exhibits no motion tenderness, no discharge and no friability. Right adnexum displays no mass, no tenderness and no fullness. Left adnexum displays no mass, no tenderness and no fullness. No erythema, tenderness or bleeding in the vagina. No vaginal  discharge found.   Neurological: She is alert and oriented to person, place, and time.   Skin: Skin is warm and dry.   Psychiatric: She has a normal mood and affect. Her behavior is normal. Judgment and thought content normal.   Nursing note and vitals reviewed.      Assessment/Plan   Evelia was seen today for repeat pap.    Diagnoses and all orders for this visit:    Pap smear of cervix unsatisfactory  Comments:  Pap smear is repeated today. Her last pap did not have enough cells due to dryness. Pt has been using Coconut Oil daily.   Orders:  -     Liquid-based Pap Smear, Screening    Anxiety  Comments:  Pt is offered some thing for anxiety. Wishes to try Buspar.  Orders:  -     busPIRone (BUSPAR) 5 MG tablet; Take 1 tablet by mouth 3 (Three) Times a Day.    Abnormal mammogram  Comments:  Pt had a suspcious Mammo with Dr. Alejandro and had BX. The BX shows atypical cells. He has recommended MRI and possible further surgery. She sees him Monday.         Patient's BMI is within normal parameters. No follow-up required.      Nano Spence, APRN  2/28/2018

## 2018-03-05 ENCOUNTER — OFFICE VISIT (OUTPATIENT)
Dept: SURGERY | Age: 63
End: 2018-03-05
Payer: COMMERCIAL

## 2018-03-05 VITALS
SYSTOLIC BLOOD PRESSURE: 122 MMHG | HEIGHT: 64 IN | BODY MASS INDEX: 24.45 KG/M2 | HEART RATE: 94 BPM | WEIGHT: 143.2 LBS | DIASTOLIC BLOOD PRESSURE: 68 MMHG | RESPIRATION RATE: 16 BRPM

## 2018-03-05 DIAGNOSIS — N60.99 ATYPICAL DUCTAL HYPERPLASIA OF BREAST: ICD-10-CM

## 2018-03-05 PROCEDURE — 99213 OFFICE O/P EST LOW 20 MIN: CPT | Performed by: SURGERY

## 2018-03-05 NOTE — Clinical Note
Schedule ultrasound-guided excision of left breast mass.  Preoperative ultrasound and intraoperative ultrasound-guided needle localization Preop ultrasound the day before in [facility] for marking, PT, etc. She wants to be after March 28

## 2018-03-22 ENCOUNTER — HOSPITAL ENCOUNTER (OUTPATIENT)
Dept: PREADMISSION TESTING | Age: 63
Discharge: HOME | End: 2018-03-26
Payer: COMMERCIAL

## 2018-03-22 VITALS — BODY MASS INDEX: 24.24 KG/M2 | WEIGHT: 142 LBS | HEIGHT: 64 IN

## 2018-03-22 LAB
BASOPHILS ABSOLUTE: 0.1 K/UL (ref 0–0.2)
BASOPHILS RELATIVE PERCENT: 1.3 % (ref 0–1)
EOSINOPHILS ABSOLUTE: 0.1 K/UL (ref 0–0.6)
EOSINOPHILS RELATIVE PERCENT: 1.3 % (ref 0–5)
HCT VFR BLD CALC: 44.1 % (ref 37–47)
HEMOGLOBIN: 14.3 G/DL (ref 12–16)
LYMPHOCYTES ABSOLUTE: 1.5 K/UL (ref 1.1–4.5)
LYMPHOCYTES RELATIVE PERCENT: 28.6 % (ref 20–40)
MCH RBC QN AUTO: 30 PG (ref 27–31)
MCHC RBC AUTO-ENTMCNC: 32.4 G/DL (ref 33–37)
MCV RBC AUTO: 92.5 FL (ref 81–99)
MONOCYTES ABSOLUTE: 0.5 K/UL (ref 0–0.9)
MONOCYTES RELATIVE PERCENT: 9.3 % (ref 0–10)
NEUTROPHILS ABSOLUTE: 3.1 K/UL (ref 1.5–7.5)
NEUTROPHILS RELATIVE PERCENT: 59.3 % (ref 50–65)
PDW BLD-RTO: 12.5 % (ref 11.5–14.5)
PLATELET # BLD: 235 K/UL (ref 130–400)
PMV BLD AUTO: 11.3 FL (ref 9.4–12.3)
RBC # BLD: 4.77 M/UL (ref 4.2–5.4)
WBC # BLD: 5.3 K/UL (ref 4.8–10.8)

## 2018-03-22 PROCEDURE — 93005 ELECTROCARDIOGRAM TRACING: CPT

## 2018-03-22 PROCEDURE — 85025 COMPLETE CBC W/AUTO DIFF WBC: CPT

## 2018-03-22 RX ORDER — LORATADINE 10 MG/1
10 CAPSULE, LIQUID FILLED ORAL DAILY
COMMUNITY
End: 2018-05-30 | Stop reason: ALTCHOICE

## 2018-03-26 LAB
EKG P AXIS: 56 DEGREES
EKG P-R INTERVAL: 154 MS
EKG Q-T INTERVAL: 406 MS
EKG QRS DURATION: 76 MS
EKG QTC CALCULATION (BAZETT): 418 MS
EKG T AXIS: 42 DEGREES

## 2018-03-29 ENCOUNTER — HOSPITAL ENCOUNTER (OUTPATIENT)
Dept: WOMENS IMAGING | Age: 63
Discharge: HOME | End: 2018-03-29
Payer: COMMERCIAL

## 2018-03-29 DIAGNOSIS — N60.92 ATYPICAL DUCTAL HYPERPLASIA OF LEFT BREAST: ICD-10-CM

## 2018-03-29 PROCEDURE — 76642 ULTRASOUND BREAST LIMITED: CPT

## 2018-03-30 ENCOUNTER — HOSPITAL ENCOUNTER (OUTPATIENT)
Age: 63
Setting detail: OUTPATIENT SURGERY
Discharge: HOME | End: 2018-03-30
Attending: SURGERY | Admitting: SURGERY
Payer: COMMERCIAL

## 2018-03-30 ENCOUNTER — ANESTHESIA (OUTPATIENT)
Dept: OPERATING ROOM | Age: 63
End: 2018-03-30
Payer: COMMERCIAL

## 2018-03-30 ENCOUNTER — ANESTHESIA EVENT (OUTPATIENT)
Dept: OPERATING ROOM | Age: 63
End: 2018-03-30
Payer: COMMERCIAL

## 2018-03-30 ENCOUNTER — HOSPITAL ENCOUNTER (OUTPATIENT)
Dept: ULTRASOUND IMAGING | Age: 63
Discharge: HOME | End: 2018-03-30
Payer: COMMERCIAL

## 2018-03-30 VITALS
OXYGEN SATURATION: 99 % | HEART RATE: 75 BPM | TEMPERATURE: 97 F | RESPIRATION RATE: 16 BRPM | SYSTOLIC BLOOD PRESSURE: 156 MMHG | DIASTOLIC BLOOD PRESSURE: 84 MMHG | WEIGHT: 140 LBS | BODY MASS INDEX: 23.9 KG/M2 | HEIGHT: 64 IN

## 2018-03-30 VITALS
SYSTOLIC BLOOD PRESSURE: 169 MMHG | DIASTOLIC BLOOD PRESSURE: 65 MMHG | RESPIRATION RATE: 15 BRPM | OXYGEN SATURATION: 100 % | TEMPERATURE: 96 F

## 2018-03-30 DIAGNOSIS — Z85.3 PERSONAL HISTORY OF MALIGNANT NEOPLASM OF BREAST: ICD-10-CM

## 2018-03-30 PROBLEM — N60.92 ATYPICAL DUCTAL HYPERPLASIA OF LEFT BREAST: Status: ACTIVE | Noted: 2018-03-05

## 2018-03-30 PROCEDURE — 99999 PR OFFICE/OUTPT VISIT,PROCEDURE ONLY: CPT | Performed by: PHYSICIAN ASSISTANT

## 2018-03-30 PROCEDURE — 2500000003 HC RX 250 WO HCPCS: Performed by: SURGERY

## 2018-03-30 PROCEDURE — 6360000002 HC RX W HCPCS: Performed by: ANESTHESIOLOGY

## 2018-03-30 PROCEDURE — 3600000014 HC SURGERY LEVEL 4 ADDTL 15MIN: Performed by: SURGERY

## 2018-03-30 PROCEDURE — 19285 PERQ DEV BREAST 1ST US IMAG: CPT | Performed by: SURGERY

## 2018-03-30 PROCEDURE — 2580000003 HC RX 258: Performed by: ANESTHESIOLOGY

## 2018-03-30 PROCEDURE — 6360000002 HC RX W HCPCS: Performed by: NURSE ANESTHETIST, CERTIFIED REGISTERED

## 2018-03-30 PROCEDURE — 2720000001 HC MISC SURG SUPPLY STERILE $51-500: Performed by: SURGERY

## 2018-03-30 PROCEDURE — 7100000000 HC PACU RECOVERY - FIRST 15 MIN: Performed by: SURGERY

## 2018-03-30 PROCEDURE — 19125 EXCISION BREAST LESION: CPT | Performed by: SURGERY

## 2018-03-30 PROCEDURE — 3700000001 HC ADD 15 MINUTES (ANESTHESIA): Performed by: SURGERY

## 2018-03-30 PROCEDURE — 7100000010 HC PHASE II RECOVERY - FIRST 15 MIN: Performed by: SURGERY

## 2018-03-30 PROCEDURE — 88307 TISSUE EXAM BY PATHOLOGIST: CPT

## 2018-03-30 PROCEDURE — 3600000004 HC SURGERY LEVEL 4 BASE: Performed by: SURGERY

## 2018-03-30 PROCEDURE — 2580000003 HC RX 258: Performed by: SURGERY

## 2018-03-30 PROCEDURE — 3700000000 HC ANESTHESIA ATTENDED CARE: Performed by: SURGERY

## 2018-03-30 PROCEDURE — 6360000002 HC RX W HCPCS: Performed by: SURGERY

## 2018-03-30 PROCEDURE — 7100000001 HC PACU RECOVERY - ADDTL 15 MIN: Performed by: SURGERY

## 2018-03-30 PROCEDURE — 99999 PR OFFICE/OUTPT VISIT,PROCEDURE ONLY: CPT | Performed by: SURGERY

## 2018-03-30 PROCEDURE — 19285 PERQ DEV BREAST 1ST US IMAG: CPT

## 2018-03-30 PROCEDURE — 7100000011 HC PHASE II RECOVERY - ADDTL 15 MIN: Performed by: SURGERY

## 2018-03-30 PROCEDURE — 6370000000 HC RX 637 (ALT 250 FOR IP): Performed by: ANESTHESIOLOGY

## 2018-03-30 RX ORDER — HYDROCODONE BITARTRATE AND ACETAMINOPHEN 5; 325 MG/1; MG/1
1 TABLET ORAL EVERY 6 HOURS PRN
Qty: 20 TABLET | Refills: 0 | Status: SHIPPED | OUTPATIENT
Start: 2018-03-30 | End: 2018-07-03

## 2018-03-30 RX ORDER — SODIUM CHLORIDE 0.9 % (FLUSH) 0.9 %
10 SYRINGE (ML) INJECTION EVERY 12 HOURS SCHEDULED
Status: DISCONTINUED | OUTPATIENT
Start: 2018-03-30 | End: 2018-03-30 | Stop reason: HOSPADM

## 2018-03-30 RX ORDER — MORPHINE SULFATE 4 MG/ML
2 INJECTION, SOLUTION INTRAMUSCULAR; INTRAVENOUS EVERY 5 MIN PRN
Status: DISCONTINUED | OUTPATIENT
Start: 2018-03-30 | End: 2018-03-30 | Stop reason: HOSPADM

## 2018-03-30 RX ORDER — ONDANSETRON 2 MG/ML
INJECTION INTRAMUSCULAR; INTRAVENOUS PRN
Status: DISCONTINUED | OUTPATIENT
Start: 2018-03-30 | End: 2018-03-30 | Stop reason: SDUPTHER

## 2018-03-30 RX ORDER — MEPERIDINE HYDROCHLORIDE 50 MG/ML
12.5 INJECTION INTRAMUSCULAR; INTRAVENOUS; SUBCUTANEOUS EVERY 5 MIN PRN
Status: DISCONTINUED | OUTPATIENT
Start: 2018-03-30 | End: 2018-03-30 | Stop reason: HOSPADM

## 2018-03-30 RX ORDER — SODIUM CHLORIDE 9 MG/ML
INJECTION, SOLUTION INTRAVENOUS CONTINUOUS
Status: DISCONTINUED | OUTPATIENT
Start: 2018-03-30 | End: 2018-03-30 | Stop reason: HOSPADM

## 2018-03-30 RX ORDER — ENALAPRILAT 2.5 MG/2ML
1.25 INJECTION INTRAVENOUS
Status: DISCONTINUED | OUTPATIENT
Start: 2018-03-30 | End: 2018-03-30 | Stop reason: HOSPADM

## 2018-03-30 RX ORDER — MORPHINE SULFATE 4 MG/ML
4 INJECTION, SOLUTION INTRAMUSCULAR; INTRAVENOUS EVERY 5 MIN PRN
Status: DISCONTINUED | OUTPATIENT
Start: 2018-03-30 | End: 2018-03-30 | Stop reason: HOSPADM

## 2018-03-30 RX ORDER — APREPITANT 40 MG/1
40 CAPSULE ORAL ONCE
Status: COMPLETED | OUTPATIENT
Start: 2018-03-30 | End: 2018-03-30

## 2018-03-30 RX ORDER — PROPOFOL 10 MG/ML
INJECTION, EMULSION INTRAVENOUS PRN
Status: DISCONTINUED | OUTPATIENT
Start: 2018-03-30 | End: 2018-03-30 | Stop reason: SDUPTHER

## 2018-03-30 RX ORDER — FENTANYL CITRATE 50 UG/ML
25 INJECTION, SOLUTION INTRAMUSCULAR; INTRAVENOUS
Status: DISCONTINUED | OUTPATIENT
Start: 2018-03-30 | End: 2018-03-30 | Stop reason: HOSPADM

## 2018-03-30 RX ORDER — LABETALOL HYDROCHLORIDE 5 MG/ML
5 INJECTION, SOLUTION INTRAVENOUS EVERY 10 MIN PRN
Status: DISCONTINUED | OUTPATIENT
Start: 2018-03-30 | End: 2018-03-30 | Stop reason: HOSPADM

## 2018-03-30 RX ORDER — FENTANYL CITRATE 50 UG/ML
INJECTION, SOLUTION INTRAMUSCULAR; INTRAVENOUS PRN
Status: DISCONTINUED | OUTPATIENT
Start: 2018-03-30 | End: 2018-03-30 | Stop reason: SDUPTHER

## 2018-03-30 RX ORDER — CEFAZOLIN SODIUM 1 G/3ML
INJECTION, POWDER, FOR SOLUTION INTRAMUSCULAR; INTRAVENOUS PRN
Status: DISCONTINUED | OUTPATIENT
Start: 2018-03-30 | End: 2018-03-30 | Stop reason: SDUPTHER

## 2018-03-30 RX ORDER — MIDAZOLAM HYDROCHLORIDE 1 MG/ML
2 INJECTION INTRAMUSCULAR; INTRAVENOUS
Status: COMPLETED | OUTPATIENT
Start: 2018-03-30 | End: 2018-03-30

## 2018-03-30 RX ORDER — DEXAMETHASONE SODIUM PHOSPHATE 10 MG/ML
INJECTION INTRAMUSCULAR; INTRAVENOUS PRN
Status: DISCONTINUED | OUTPATIENT
Start: 2018-03-30 | End: 2018-03-30 | Stop reason: SDUPTHER

## 2018-03-30 RX ORDER — PROPOFOL 10 MG/ML
INJECTION, EMULSION INTRAVENOUS CONTINUOUS PRN
Status: DISCONTINUED | OUTPATIENT
Start: 2018-03-30 | End: 2018-03-30 | Stop reason: SDUPTHER

## 2018-03-30 RX ORDER — DIPHENHYDRAMINE HYDROCHLORIDE 50 MG/ML
12.5 INJECTION INTRAMUSCULAR; INTRAVENOUS
Status: DISCONTINUED | OUTPATIENT
Start: 2018-03-30 | End: 2018-03-30 | Stop reason: HOSPADM

## 2018-03-30 RX ORDER — SODIUM CHLORIDE, SODIUM LACTATE, POTASSIUM CHLORIDE, CALCIUM CHLORIDE 600; 310; 30; 20 MG/100ML; MG/100ML; MG/100ML; MG/100ML
INJECTION, SOLUTION INTRAVENOUS CONTINUOUS
Status: DISCONTINUED | OUTPATIENT
Start: 2018-03-30 | End: 2018-03-30 | Stop reason: HOSPADM

## 2018-03-30 RX ORDER — SODIUM CHLORIDE 0.9 % (FLUSH) 0.9 %
10 SYRINGE (ML) INJECTION PRN
Status: DISCONTINUED | OUTPATIENT
Start: 2018-03-30 | End: 2018-03-30 | Stop reason: HOSPADM

## 2018-03-30 RX ORDER — LIDOCAINE HYDROCHLORIDE 10 MG/ML
1 INJECTION, SOLUTION EPIDURAL; INFILTRATION; INTRACAUDAL; PERINEURAL
Status: DISCONTINUED | OUTPATIENT
Start: 2018-03-30 | End: 2018-03-30 | Stop reason: HOSPADM

## 2018-03-30 RX ORDER — KETOROLAC TROMETHAMINE 30 MG/ML
INJECTION, SOLUTION INTRAMUSCULAR; INTRAVENOUS PRN
Status: DISCONTINUED | OUTPATIENT
Start: 2018-03-30 | End: 2018-03-30 | Stop reason: SDUPTHER

## 2018-03-30 RX ORDER — FENTANYL CITRATE 50 UG/ML
50 INJECTION, SOLUTION INTRAMUSCULAR; INTRAVENOUS
Status: DISCONTINUED | OUTPATIENT
Start: 2018-03-30 | End: 2018-03-30 | Stop reason: HOSPADM

## 2018-03-30 RX ORDER — SCOLOPAMINE TRANSDERMAL SYSTEM 1 MG/1
1 PATCH, EXTENDED RELEASE TRANSDERMAL ONCE
Status: DISCONTINUED | OUTPATIENT
Start: 2018-03-30 | End: 2018-03-30 | Stop reason: HOSPADM

## 2018-03-30 RX ORDER — METOCLOPRAMIDE HYDROCHLORIDE 5 MG/ML
10 INJECTION INTRAMUSCULAR; INTRAVENOUS
Status: DISCONTINUED | OUTPATIENT
Start: 2018-03-30 | End: 2018-03-30 | Stop reason: HOSPADM

## 2018-03-30 RX ORDER — HYDRALAZINE HYDROCHLORIDE 20 MG/ML
5 INJECTION INTRAMUSCULAR; INTRAVENOUS EVERY 10 MIN PRN
Status: DISCONTINUED | OUTPATIENT
Start: 2018-03-30 | End: 2018-03-30 | Stop reason: HOSPADM

## 2018-03-30 RX ORDER — PROMETHAZINE HYDROCHLORIDE 25 MG/ML
6.25 INJECTION, SOLUTION INTRAMUSCULAR; INTRAVENOUS
Status: DISCONTINUED | OUTPATIENT
Start: 2018-03-30 | End: 2018-03-30 | Stop reason: HOSPADM

## 2018-03-30 RX ADMIN — APREPITANT 40 MG: 40 CAPSULE ORAL at 08:49

## 2018-03-30 RX ADMIN — MIDAZOLAM HYDROCHLORIDE 2 MG: 1 INJECTION, SOLUTION INTRAMUSCULAR; INTRAVENOUS at 10:23

## 2018-03-30 RX ADMIN — KETOROLAC TROMETHAMINE 30 MG: 30 INJECTION, SOLUTION INTRAMUSCULAR at 10:32

## 2018-03-30 RX ADMIN — PROPOFOL 200 MG: 10 INJECTION, EMULSION INTRAVENOUS at 10:28

## 2018-03-30 RX ADMIN — CEFAZOLIN 2000 MG: 1 INJECTION, POWDER, FOR SOLUTION INTRAMUSCULAR; INTRAVENOUS; PARENTERAL at 10:40

## 2018-03-30 RX ADMIN — FENTANYL CITRATE 50 MCG: 50 INJECTION, SOLUTION INTRAMUSCULAR; INTRAVENOUS at 10:42

## 2018-03-30 RX ADMIN — MORPHINE SULFATE 4 MG: 4 INJECTION, SOLUTION INTRAMUSCULAR; INTRAVENOUS at 11:50

## 2018-03-30 RX ADMIN — PROPOFOL 140 MCG/KG/MIN: 10 INJECTION, EMULSION INTRAVENOUS at 10:31

## 2018-03-30 RX ADMIN — ONDANSETRON HYDROCHLORIDE 4 MG: 2 SOLUTION INTRAMUSCULAR; INTRAVENOUS at 10:32

## 2018-03-30 RX ADMIN — SODIUM CHLORIDE, POTASSIUM CHLORIDE, SODIUM LACTATE AND CALCIUM CHLORIDE: 600; 310; 30; 20 INJECTION, SOLUTION INTRAVENOUS at 08:46

## 2018-03-30 RX ADMIN — DEXAMETHASONE SODIUM PHOSPHATE 10 MG: 10 INJECTION INTRAMUSCULAR; INTRAVENOUS at 10:32

## 2018-03-30 ASSESSMENT — PAIN SCALES - GENERAL
PAINLEVEL_OUTOF10: 0
PAINLEVEL_OUTOF10: 6

## 2018-03-30 ASSESSMENT — ENCOUNTER SYMPTOMS: SHORTNESS OF BREATH: 0

## 2018-03-30 ASSESSMENT — PAIN - FUNCTIONAL ASSESSMENT: PAIN_FUNCTIONAL_ASSESSMENT: 0-10

## 2018-03-30 ASSESSMENT — LIFESTYLE VARIABLES: SMOKING_STATUS: 0

## 2018-04-03 ENCOUNTER — TELEPHONE (OUTPATIENT)
Dept: SURGERY | Age: 63
End: 2018-04-03

## 2018-04-30 ENCOUNTER — INITIAL CONSULT (OUTPATIENT)
Dept: SURGERY | Age: 63
End: 2018-04-30
Payer: COMMERCIAL

## 2018-04-30 DIAGNOSIS — D05.12 DUCTAL CARCINOMA IN SITU (DCIS) OF LEFT BREAST: ICD-10-CM

## 2018-04-30 DIAGNOSIS — Z98.890 S/P LUMPECTOMY, LEFT BREAST: Primary | ICD-10-CM

## 2018-04-30 PROCEDURE — 99354 PR PROLONGED SVC OUTPATIENT SETTING 1ST HOUR: CPT | Performed by: SURGERY

## 2018-04-30 PROCEDURE — 99215 OFFICE O/P EST HI 40 MIN: CPT | Performed by: SURGERY

## 2018-05-09 ENCOUNTER — TELEPHONE (OUTPATIENT)
Dept: SURGERY | Age: 63
End: 2018-05-09

## 2018-05-10 PROBLEM — D05.12 DUCTAL CARCINOMA IN SITU (DCIS) OF LEFT BREAST: Status: ACTIVE | Noted: 2018-05-10

## 2018-05-18 ENCOUNTER — LAB (OUTPATIENT)
Dept: LAB | Facility: HOSPITAL | Age: 63
End: 2018-05-18
Attending: INTERNAL MEDICINE

## 2018-05-18 ENCOUNTER — TRANSCRIBE ORDERS (OUTPATIENT)
Dept: ADMINISTRATIVE | Facility: HOSPITAL | Age: 63
End: 2018-05-18

## 2018-05-18 DIAGNOSIS — Z85.3 PERSONAL HISTORY OF BREAST CANCER: Primary | ICD-10-CM

## 2018-05-18 DIAGNOSIS — Z85.3 PERSONAL HISTORY OF BREAST CANCER: ICD-10-CM

## 2018-05-18 LAB
ALBUMIN SERPL-MCNC: 4.2 G/DL (ref 3.5–5)
ALBUMIN/GLOB SERPL: 1.7 G/DL (ref 1.1–2.5)
ALP SERPL-CCNC: 94 U/L (ref 24–120)
ALT SERPL W P-5'-P-CCNC: 24 U/L (ref 0–54)
ANION GAP SERPL CALCULATED.3IONS-SCNC: 10 MMOL/L (ref 4–13)
AST SERPL-CCNC: 19 U/L (ref 7–45)
BASOPHILS # BLD AUTO: 0.06 10*3/MM3 (ref 0–0.2)
BASOPHILS NFR BLD AUTO: 1.3 % (ref 0–2)
BILIRUB SERPL-MCNC: 0.5 MG/DL (ref 0.1–1)
BUN BLD-MCNC: 8 MG/DL (ref 5–21)
BUN/CREAT SERPL: 12.9 (ref 7–25)
CALCIUM SPEC-SCNC: 9.5 MG/DL (ref 8.4–10.4)
CEA SERPL-MCNC: 2.45 NG/ML (ref 0–5)
CHLORIDE SERPL-SCNC: 104 MMOL/L (ref 98–110)
CO2 SERPL-SCNC: 27 MMOL/L (ref 24–31)
CREAT BLD-MCNC: 0.62 MG/DL (ref 0.5–1.4)
DEPRECATED RDW RBC AUTO: 40.5 FL (ref 40–54)
EOSINOPHIL # BLD AUTO: 0.06 10*3/MM3 (ref 0–0.7)
EOSINOPHIL NFR BLD AUTO: 1.3 % (ref 0–4)
ERYTHROCYTE [DISTWIDTH] IN BLOOD BY AUTOMATED COUNT: 12.3 % (ref 12–15)
GFR SERPL CREATININE-BSD FRML MDRD: 98 ML/MIN/1.73
GLOBULIN UR ELPH-MCNC: 2.5 GM/DL
GLUCOSE BLD-MCNC: 87 MG/DL (ref 70–100)
HCT VFR BLD AUTO: 42.1 % (ref 37–47)
HGB BLD-MCNC: 14 G/DL (ref 12–16)
IMM GRANULOCYTES # BLD: 0.01 10*3/MM3 (ref 0–0.03)
IMM GRANULOCYTES NFR BLD: 0.2 % (ref 0–5)
LYMPHOCYTES # BLD AUTO: 1.44 10*3/MM3 (ref 0.72–4.86)
LYMPHOCYTES NFR BLD AUTO: 30.2 % (ref 15–45)
MCH RBC QN AUTO: 30 PG (ref 28–32)
MCHC RBC AUTO-ENTMCNC: 33.3 G/DL (ref 33–36)
MCV RBC AUTO: 90.3 FL (ref 82–98)
MONOCYTES # BLD AUTO: 0.41 10*3/MM3 (ref 0.19–1.3)
MONOCYTES NFR BLD AUTO: 8.6 % (ref 4–12)
NEUTROPHILS # BLD AUTO: 2.79 10*3/MM3 (ref 1.87–8.4)
NEUTROPHILS NFR BLD AUTO: 58.4 % (ref 39–78)
NRBC BLD MANUAL-RTO: 0 /100 WBC (ref 0–0)
PLATELET # BLD AUTO: 252 10*3/MM3 (ref 130–400)
PMV BLD AUTO: 11.5 FL (ref 6–12)
POTASSIUM BLD-SCNC: 4.4 MMOL/L (ref 3.5–5.3)
PROT SERPL-MCNC: 6.7 G/DL (ref 6.3–8.7)
RBC # BLD AUTO: 4.66 10*6/MM3 (ref 4.2–5.4)
SODIUM BLD-SCNC: 141 MMOL/L (ref 135–145)
WBC NRBC COR # BLD: 4.77 10*3/MM3 (ref 4.8–10.8)

## 2018-05-18 PROCEDURE — 80053 COMPREHEN METABOLIC PANEL: CPT | Performed by: INTERNAL MEDICINE

## 2018-05-18 PROCEDURE — 86300 IMMUNOASSAY TUMOR CA 15-3: CPT | Performed by: INTERNAL MEDICINE

## 2018-05-18 PROCEDURE — 85025 COMPLETE CBC W/AUTO DIFF WBC: CPT | Performed by: INTERNAL MEDICINE

## 2018-05-18 PROCEDURE — 36415 COLL VENOUS BLD VENIPUNCTURE: CPT

## 2018-05-18 PROCEDURE — 82378 CARCINOEMBRYONIC ANTIGEN: CPT | Performed by: INTERNAL MEDICINE

## 2018-05-19 LAB — CANCER AG27-29 SERPL-ACNC: 25.9 U/ML (ref 0–38.6)

## 2018-05-29 ENCOUNTER — TELEPHONE (OUTPATIENT)
Dept: OBSTETRICS AND GYNECOLOGY | Facility: CLINIC | Age: 63
End: 2018-05-29

## 2018-05-29 NOTE — TELEPHONE ENCOUNTER
Pt called she has to have a Bilateral Mastectomy she is seeing Dr Alejandro and Dr Richmond but just wanted to see if you knew of any Surgeons in [place] that you recommended.

## 2018-05-30 ENCOUNTER — OFFICE VISIT (OUTPATIENT)
Dept: SURGERY | Age: 63
End: 2018-05-30

## 2018-05-30 VITALS
BODY MASS INDEX: 23.9 KG/M2 | WEIGHT: 140 LBS | DIASTOLIC BLOOD PRESSURE: 64 MMHG | TEMPERATURE: 97.6 F | HEIGHT: 64 IN | SYSTOLIC BLOOD PRESSURE: 110 MMHG | HEART RATE: 70 BPM

## 2018-05-30 DIAGNOSIS — Z85.3 PERSONAL HISTORY OF MALIGNANT NEOPLASM OF BREAST: Primary | ICD-10-CM

## 2018-05-30 PROCEDURE — 99024 POSTOP FOLLOW-UP VISIT: CPT | Performed by: PHYSICIAN ASSISTANT

## 2018-05-30 RX ORDER — MULTIVIT WITH MINERALS/LUTEIN
1000 TABLET ORAL DAILY
COMMUNITY
End: 2018-11-01

## 2018-05-30 RX ORDER — TAMOXIFEN CITRATE 10 MG/1
20 TABLET ORAL DAILY
COMMUNITY
End: 2018-07-19 | Stop reason: ALTCHOICE

## 2018-06-05 ENCOUNTER — TELEPHONE (OUTPATIENT)
Dept: SURGERY | Age: 63
End: 2018-06-05

## 2018-06-06 ENCOUNTER — TELEPHONE (OUTPATIENT)
Dept: RADIATION ONCOLOGY | Facility: HOSPITAL | Age: 63
End: 2018-06-06

## 2018-06-06 NOTE — TELEPHONE ENCOUNTER
Patient called to state that she has decided to have bilateral mastectomies instead of coming to talk to Dr. Damian about radiation therapy.  Consult for June 11 has been cancelled per patient.

## 2018-06-11 ENCOUNTER — OFFICE VISIT (OUTPATIENT)
Dept: SURGERY | Age: 63
End: 2018-06-11
Payer: COMMERCIAL

## 2018-06-11 DIAGNOSIS — Z98.890 S/P LUMPECTOMY, LEFT BREAST: Primary | ICD-10-CM

## 2018-06-11 DIAGNOSIS — Z85.3 PERSONAL HISTORY OF MALIGNANT NEOPLASM OF BREAST: ICD-10-CM

## 2018-06-11 DIAGNOSIS — D05.12 DUCTAL CARCINOMA IN SITU (DCIS) OF LEFT BREAST: ICD-10-CM

## 2018-06-11 PROCEDURE — 99204 OFFICE O/P NEW MOD 45 MIN: CPT | Performed by: SURGERY

## 2018-06-14 DIAGNOSIS — D05.12 DUCTAL CARCINOMA IN SITU (DCIS) OF LEFT BREAST: Primary | ICD-10-CM

## 2018-06-18 ENCOUNTER — APPOINTMENT (OUTPATIENT)
Dept: LAB | Facility: HOSPITAL | Age: 63
End: 2018-06-18
Attending: INTERNAL MEDICINE

## 2018-06-18 ENCOUNTER — TRANSCRIBE ORDERS (OUTPATIENT)
Dept: ADMINISTRATIVE | Facility: HOSPITAL | Age: 63
End: 2018-06-18

## 2018-06-18 DIAGNOSIS — Z85.3 PERSONAL HISTORY OF BREAST CANCER: Primary | ICD-10-CM

## 2018-06-18 LAB
ALBUMIN SERPL-MCNC: 3.6 G/DL (ref 3.5–5)
ALBUMIN/GLOB SERPL: 1.5 G/DL (ref 1.1–2.5)
ALP SERPL-CCNC: 70 U/L (ref 24–120)
ALT SERPL W P-5'-P-CCNC: 24 U/L (ref 0–54)
ANION GAP SERPL CALCULATED.3IONS-SCNC: 10 MMOL/L (ref 4–13)
AST SERPL-CCNC: 21 U/L (ref 7–45)
BASOPHILS # BLD AUTO: 0.07 10*3/MM3 (ref 0–0.2)
BASOPHILS NFR BLD AUTO: 1.3 % (ref 0–2)
BILIRUB SERPL-MCNC: 0.4 MG/DL (ref 0.1–1)
BUN BLD-MCNC: 12 MG/DL (ref 5–21)
BUN/CREAT SERPL: 18.2 (ref 7–25)
CALCIUM SPEC-SCNC: 8.5 MG/DL (ref 8.4–10.4)
CEA SERPL-MCNC: 2.78 NG/ML (ref 0–5)
CHLORIDE SERPL-SCNC: 106 MMOL/L (ref 98–110)
CO2 SERPL-SCNC: 24 MMOL/L (ref 24–31)
CREAT BLD-MCNC: 0.66 MG/DL (ref 0.5–1.4)
DEPRECATED RDW RBC AUTO: 40.9 FL (ref 40–54)
EOSINOPHIL # BLD AUTO: 0.12 10*3/MM3 (ref 0–0.7)
EOSINOPHIL NFR BLD AUTO: 2.2 % (ref 0–4)
ERYTHROCYTE [DISTWIDTH] IN BLOOD BY AUTOMATED COUNT: 12.3 % (ref 12–15)
GFR SERPL CREATININE-BSD FRML MDRD: 91 ML/MIN/1.73
GLOBULIN UR ELPH-MCNC: 2.4 GM/DL
GLUCOSE BLD-MCNC: 109 MG/DL (ref 70–100)
HCT VFR BLD AUTO: 38.4 % (ref 37–47)
HGB BLD-MCNC: 12.9 G/DL (ref 12–16)
IMM GRANULOCYTES # BLD: 0.01 10*3/MM3 (ref 0–0.03)
IMM GRANULOCYTES NFR BLD: 0.2 % (ref 0–5)
LYMPHOCYTES # BLD AUTO: 1.61 10*3/MM3 (ref 0.72–4.86)
LYMPHOCYTES NFR BLD AUTO: 29.8 % (ref 15–45)
MCH RBC QN AUTO: 30.7 PG (ref 28–32)
MCHC RBC AUTO-ENTMCNC: 33.6 G/DL (ref 33–36)
MCV RBC AUTO: 91.4 FL (ref 82–98)
MONOCYTES # BLD AUTO: 0.58 10*3/MM3 (ref 0.19–1.3)
MONOCYTES NFR BLD AUTO: 10.7 % (ref 4–12)
NEUTROPHILS # BLD AUTO: 3.02 10*3/MM3 (ref 1.87–8.4)
NEUTROPHILS NFR BLD AUTO: 55.8 % (ref 39–78)
NRBC BLD MANUAL-RTO: 0 /100 WBC (ref 0–0)
PLATELET # BLD AUTO: 195 10*3/MM3 (ref 130–400)
PMV BLD AUTO: 11.5 FL (ref 6–12)
POTASSIUM BLD-SCNC: 4.2 MMOL/L (ref 3.5–5.3)
PROT SERPL-MCNC: 6 G/DL (ref 6.3–8.7)
RBC # BLD AUTO: 4.2 10*6/MM3 (ref 4.2–5.4)
SODIUM BLD-SCNC: 140 MMOL/L (ref 135–145)
WBC NRBC COR # BLD: 5.41 10*3/MM3 (ref 4.8–10.8)

## 2018-06-18 PROCEDURE — 85025 COMPLETE CBC W/AUTO DIFF WBC: CPT | Performed by: INTERNAL MEDICINE

## 2018-06-18 PROCEDURE — 86300 IMMUNOASSAY TUMOR CA 15-3: CPT | Performed by: INTERNAL MEDICINE

## 2018-06-18 PROCEDURE — 36415 COLL VENOUS BLD VENIPUNCTURE: CPT

## 2018-06-18 PROCEDURE — 80053 COMPREHEN METABOLIC PANEL: CPT | Performed by: INTERNAL MEDICINE

## 2018-06-18 PROCEDURE — 82378 CARCINOEMBRYONIC ANTIGEN: CPT | Performed by: INTERNAL MEDICINE

## 2018-06-19 LAB — CANCER AG27-29 SERPL-ACNC: 17.1 U/ML (ref 0–38.6)

## 2018-07-03 ENCOUNTER — HOSPITAL ENCOUNTER (OUTPATIENT)
Dept: PREADMISSION TESTING | Age: 63
Discharge: HOME | End: 2018-07-07
Payer: COMMERCIAL

## 2018-07-03 VITALS — WEIGHT: 140 LBS | HEIGHT: 64 IN | BODY MASS INDEX: 23.9 KG/M2

## 2018-07-03 LAB
BASOPHILS ABSOLUTE: 0.1 K/UL (ref 0–0.2)
BASOPHILS RELATIVE PERCENT: 1.2 % (ref 0–1)
EOSINOPHILS ABSOLUTE: 0.1 K/UL (ref 0–0.6)
EOSINOPHILS RELATIVE PERCENT: 1.4 % (ref 0–5)
HCT VFR BLD CALC: 40.8 % (ref 37–47)
HEMOGLOBIN: 13 G/DL (ref 12–16)
LYMPHOCYTES ABSOLUTE: 1.7 K/UL (ref 1.1–4.5)
LYMPHOCYTES RELATIVE PERCENT: 29.3 % (ref 20–40)
MCH RBC QN AUTO: 30.5 PG (ref 27–31)
MCHC RBC AUTO-ENTMCNC: 31.9 G/DL (ref 33–37)
MCV RBC AUTO: 95.8 FL (ref 81–99)
MONOCYTES ABSOLUTE: 0.5 K/UL (ref 0–0.9)
MONOCYTES RELATIVE PERCENT: 8.3 % (ref 0–10)
NEUTROPHILS ABSOLUTE: 3.5 K/UL (ref 1.5–7.5)
NEUTROPHILS RELATIVE PERCENT: 59.5 % (ref 50–65)
PDW BLD-RTO: 12.4 % (ref 11.5–14.5)
PLATELET # BLD: 191 K/UL (ref 130–400)
PMV BLD AUTO: 12.4 FL (ref 9.4–12.3)
RBC # BLD: 4.26 M/UL (ref 4.2–5.4)
WBC # BLD: 5.9 K/UL (ref 4.8–10.8)

## 2018-07-03 PROCEDURE — 93005 ELECTROCARDIOGRAM TRACING: CPT

## 2018-07-03 PROCEDURE — 85025 COMPLETE CBC W/AUTO DIFF WBC: CPT

## 2018-07-04 LAB
EKG P AXIS: 49 DEGREES
EKG P-R INTERVAL: 156 MS
EKG Q-T INTERVAL: 402 MS
EKG QRS DURATION: 78 MS
EKG QTC CALCULATION (BAZETT): 407 MS
EKG T AXIS: 26 DEGREES

## 2018-07-05 ENCOUNTER — TELEPHONE (OUTPATIENT)
Dept: SURGERY | Age: 63
End: 2018-07-05

## 2018-07-12 ENCOUNTER — HOSPITAL ENCOUNTER (OUTPATIENT)
Dept: WOMENS IMAGING | Age: 63
Discharge: HOME | End: 2018-07-12
Payer: COMMERCIAL

## 2018-07-12 DIAGNOSIS — D05.12 DUCTAL CARCINOMA IN SITU (DCIS) OF LEFT BREAST: ICD-10-CM

## 2018-07-13 ENCOUNTER — ANESTHESIA EVENT (OUTPATIENT)
Dept: OPERATING ROOM | Age: 63
End: 2018-07-13
Payer: COMMERCIAL

## 2018-07-13 ENCOUNTER — ANESTHESIA (OUTPATIENT)
Dept: OPERATING ROOM | Age: 63
End: 2018-07-13
Payer: COMMERCIAL

## 2018-07-13 ENCOUNTER — HOSPITAL ENCOUNTER (OUTPATIENT)
Age: 63
Setting detail: OBSERVATION
Discharge: HOME | End: 2018-07-15
Attending: PLASTIC SURGERY | Admitting: SURGERY
Payer: COMMERCIAL

## 2018-07-13 VITALS
OXYGEN SATURATION: 100 % | SYSTOLIC BLOOD PRESSURE: 150 MMHG | RESPIRATION RATE: 13 BRPM | TEMPERATURE: 96.3 F | DIASTOLIC BLOOD PRESSURE: 79 MMHG

## 2018-07-13 DIAGNOSIS — Z85.3 PERSONAL HISTORY OF MALIGNANT NEOPLASM OF BREAST: Primary | ICD-10-CM

## 2018-07-13 PROBLEM — D05.10 DCIS (DUCTAL CARCINOMA IN SITU): Status: ACTIVE | Noted: 2018-07-13

## 2018-07-13 PROCEDURE — 2500000003 HC RX 250 WO HCPCS: Performed by: SURGERY

## 2018-07-13 PROCEDURE — 2580000003 HC RX 258: Performed by: SURGERY

## 2018-07-13 PROCEDURE — 15860 IV NJX TST VASC FLO FLAP/GRF: CPT | Performed by: SURGERY

## 2018-07-13 PROCEDURE — 6370000000 HC RX 637 (ALT 250 FOR IP): Performed by: SURGERY

## 2018-07-13 PROCEDURE — 2720000001 HC MISC SURG SUPPLY STERILE $51-500: Performed by: PLASTIC SURGERY

## 2018-07-13 PROCEDURE — G0378 HOSPITAL OBSERVATION PER HR: HCPCS

## 2018-07-13 PROCEDURE — 19303 MAST SIMPLE COMPLETE: CPT | Performed by: SURGERY

## 2018-07-13 PROCEDURE — C1729 CATH, DRAINAGE: HCPCS | Performed by: PLASTIC SURGERY

## 2018-07-13 PROCEDURE — C1789 PROSTHESIS, BREAST, IMP: HCPCS | Performed by: PLASTIC SURGERY

## 2018-07-13 PROCEDURE — 6360000002 HC RX W HCPCS: Performed by: NURSE ANESTHETIST, CERTIFIED REGISTERED

## 2018-07-13 PROCEDURE — 6360000002 HC RX W HCPCS: Performed by: ANESTHESIOLOGY

## 2018-07-13 PROCEDURE — 6370000000 HC RX 637 (ALT 250 FOR IP): Performed by: ANESTHESIOLOGY

## 2018-07-13 PROCEDURE — 2580000003 HC RX 258: Performed by: ANESTHESIOLOGY

## 2018-07-13 PROCEDURE — 94762 N-INVAS EAR/PLS OXIMTRY CONT: CPT

## 2018-07-13 PROCEDURE — 19303 MAST SIMPLE COMPLETE: CPT | Performed by: PHYSICIAN ASSISTANT

## 2018-07-13 PROCEDURE — 94664 DEMO&/EVAL PT USE INHALER: CPT

## 2018-07-13 PROCEDURE — 2720000010 HC SURG SUPPLY STERILE: Performed by: PLASTIC SURGERY

## 2018-07-13 PROCEDURE — 3700000001 HC ADD 15 MINUTES (ANESTHESIA): Performed by: PLASTIC SURGERY

## 2018-07-13 PROCEDURE — 2500000003 HC RX 250 WO HCPCS: Performed by: NURSE ANESTHETIST, CERTIFIED REGISTERED

## 2018-07-13 PROCEDURE — 88307 TISSUE EXAM BY PATHOLOGIST: CPT

## 2018-07-13 PROCEDURE — 2709999900 HC NON-CHARGEABLE SUPPLY: Performed by: PLASTIC SURGERY

## 2018-07-13 PROCEDURE — 3600000014 HC SURGERY LEVEL 4 ADDTL 15MIN: Performed by: PLASTIC SURGERY

## 2018-07-13 PROCEDURE — 2700000000 HC OXYGEN THERAPY PER DAY

## 2018-07-13 PROCEDURE — 6360000002 HC RX W HCPCS: Performed by: SURGERY

## 2018-07-13 PROCEDURE — 3700000000 HC ANESTHESIA ATTENDED CARE: Performed by: PLASTIC SURGERY

## 2018-07-13 PROCEDURE — 7100000000 HC PACU RECOVERY - FIRST 15 MIN: Performed by: PLASTIC SURGERY

## 2018-07-13 PROCEDURE — 7100000001 HC PACU RECOVERY - ADDTL 15 MIN: Performed by: PLASTIC SURGERY

## 2018-07-13 PROCEDURE — 3600000004 HC SURGERY LEVEL 4 BASE: Performed by: PLASTIC SURGERY

## 2018-07-13 PROCEDURE — 2780000010 HC IMPLANT OTHER: Performed by: PLASTIC SURGERY

## 2018-07-13 DEVICE — Z DISCONTINUED NO SUB IDED EXPANDER TISS 400CC P5.6CM W13XH12CM NACL STYL 133MV-T: Type: IMPLANTABLE DEVICE | Site: BREAST | Status: FUNCTIONAL

## 2018-07-13 DEVICE — GRAFT HUM TISS M THN CNTOUR RDY TO USE ALLDERM: Type: IMPLANTABLE DEVICE | Site: BREAST | Status: FUNCTIONAL

## 2018-07-13 RX ORDER — HYDROMORPHONE HCL IN 0.9% NACL 0.5 MG/ML
0.5 SYRINGE (ML) INTRAVENOUS EVERY 5 MIN PRN
Status: DISCONTINUED | OUTPATIENT
Start: 2018-07-13 | End: 2018-07-13 | Stop reason: HOSPADM

## 2018-07-13 RX ORDER — MORPHINE SULFATE 1 MG/ML
4 INJECTION, SOLUTION EPIDURAL; INTRATHECAL; INTRAVENOUS
Status: DISCONTINUED | OUTPATIENT
Start: 2018-07-13 | End: 2018-07-15 | Stop reason: HOSPADM

## 2018-07-13 RX ORDER — HYDROMORPHONE HCL IN 0.9% NACL 0.5 MG/ML
0.25 SYRINGE (ML) INTRAVENOUS EVERY 5 MIN PRN
Status: DISCONTINUED | OUTPATIENT
Start: 2018-07-13 | End: 2018-07-13 | Stop reason: HOSPADM

## 2018-07-13 RX ORDER — MIDAZOLAM HYDROCHLORIDE 1 MG/ML
INJECTION INTRAMUSCULAR; INTRAVENOUS PRN
Status: DISCONTINUED | OUTPATIENT
Start: 2018-07-13 | End: 2018-07-13 | Stop reason: SDUPTHER

## 2018-07-13 RX ORDER — MEPERIDINE HYDROCHLORIDE 50 MG/ML
12.5 INJECTION INTRAMUSCULAR; INTRAVENOUS; SUBCUTANEOUS EVERY 5 MIN PRN
Status: DISCONTINUED | OUTPATIENT
Start: 2018-07-13 | End: 2018-07-13 | Stop reason: HOSPADM

## 2018-07-13 RX ORDER — SODIUM CHLORIDE, SODIUM LACTATE, POTASSIUM CHLORIDE, CALCIUM CHLORIDE 600; 310; 30; 20 MG/100ML; MG/100ML; MG/100ML; MG/100ML
INJECTION, SOLUTION INTRAVENOUS CONTINUOUS
Status: DISCONTINUED | OUTPATIENT
Start: 2018-07-13 | End: 2018-07-13

## 2018-07-13 RX ORDER — METOCLOPRAMIDE HYDROCHLORIDE 5 MG/ML
10 INJECTION INTRAMUSCULAR; INTRAVENOUS
Status: COMPLETED | OUTPATIENT
Start: 2018-07-13 | End: 2018-07-13

## 2018-07-13 RX ORDER — LABETALOL HYDROCHLORIDE 5 MG/ML
5 INJECTION, SOLUTION INTRAVENOUS EVERY 10 MIN PRN
Status: DISCONTINUED | OUTPATIENT
Start: 2018-07-13 | End: 2018-07-13 | Stop reason: HOSPADM

## 2018-07-13 RX ORDER — SODIUM CHLORIDE 0.9 % (FLUSH) 0.9 %
10 SYRINGE (ML) INJECTION EVERY 12 HOURS SCHEDULED
Status: DISCONTINUED | OUTPATIENT
Start: 2018-07-13 | End: 2018-07-15 | Stop reason: HOSPADM

## 2018-07-13 RX ORDER — ONDANSETRON 2 MG/ML
4 INJECTION INTRAMUSCULAR; INTRAVENOUS EVERY 6 HOURS PRN
Status: DISCONTINUED | OUTPATIENT
Start: 2018-07-13 | End: 2018-07-15 | Stop reason: HOSPADM

## 2018-07-13 RX ORDER — SODIUM CHLORIDE 0.9 % (FLUSH) 0.9 %
10 SYRINGE (ML) INJECTION PRN
Status: DISCONTINUED | OUTPATIENT
Start: 2018-07-13 | End: 2018-07-13 | Stop reason: HOSPADM

## 2018-07-13 RX ORDER — DIPHENHYDRAMINE HYDROCHLORIDE 50 MG/ML
12.5 INJECTION INTRAMUSCULAR; INTRAVENOUS
Status: DISCONTINUED | OUTPATIENT
Start: 2018-07-13 | End: 2018-07-13 | Stop reason: HOSPADM

## 2018-07-13 RX ORDER — FENTANYL CITRATE 50 UG/ML
25 INJECTION, SOLUTION INTRAMUSCULAR; INTRAVENOUS
Status: DISCONTINUED | OUTPATIENT
Start: 2018-07-13 | End: 2018-07-13 | Stop reason: HOSPADM

## 2018-07-13 RX ORDER — ROCURONIUM BROMIDE 10 MG/ML
INJECTION, SOLUTION INTRAVENOUS PRN
Status: DISCONTINUED | OUTPATIENT
Start: 2018-07-13 | End: 2018-07-13 | Stop reason: SDUPTHER

## 2018-07-13 RX ORDER — DEXTROSE, SODIUM CHLORIDE, AND POTASSIUM CHLORIDE 5; .45; .15 G/100ML; G/100ML; G/100ML
INJECTION INTRAVENOUS CONTINUOUS
Status: DISCONTINUED | OUTPATIENT
Start: 2018-07-13 | End: 2018-07-15 | Stop reason: HOSPADM

## 2018-07-13 RX ORDER — DIPHENHYDRAMINE HCL 25 MG
50 TABLET ORAL NIGHTLY PRN
Status: DISCONTINUED | OUTPATIENT
Start: 2018-07-13 | End: 2018-07-15 | Stop reason: HOSPADM

## 2018-07-13 RX ORDER — TAMOXIFEN CITRATE 10 MG/1
20 TABLET ORAL DAILY
Status: DISCONTINUED | OUTPATIENT
Start: 2018-07-13 | End: 2018-07-15 | Stop reason: HOSPADM

## 2018-07-13 RX ORDER — SODIUM CHLORIDE 0.9 % (FLUSH) 0.9 %
10 SYRINGE (ML) INJECTION PRN
Status: DISCONTINUED | OUTPATIENT
Start: 2018-07-13 | End: 2018-07-15 | Stop reason: HOSPADM

## 2018-07-13 RX ORDER — DEXAMETHASONE SODIUM PHOSPHATE 4 MG/ML
INJECTION, SOLUTION INTRA-ARTICULAR; INTRALESIONAL; INTRAMUSCULAR; INTRAVENOUS; SOFT TISSUE PRN
Status: DISCONTINUED | OUTPATIENT
Start: 2018-07-13 | End: 2018-07-13 | Stop reason: SDUPTHER

## 2018-07-13 RX ORDER — HYDRALAZINE HYDROCHLORIDE 20 MG/ML
5 INJECTION INTRAMUSCULAR; INTRAVENOUS EVERY 10 MIN PRN
Status: DISCONTINUED | OUTPATIENT
Start: 2018-07-13 | End: 2018-07-13 | Stop reason: HOSPADM

## 2018-07-13 RX ORDER — SCOLOPAMINE TRANSDERMAL SYSTEM 1 MG/1
1 PATCH, EXTENDED RELEASE TRANSDERMAL
Status: DISCONTINUED | OUTPATIENT
Start: 2018-07-13 | End: 2018-07-13

## 2018-07-13 RX ORDER — MORPHINE SULFATE 1 MG/ML
2 INJECTION, SOLUTION EPIDURAL; INTRATHECAL; INTRAVENOUS EVERY 5 MIN PRN
Status: DISCONTINUED | OUTPATIENT
Start: 2018-07-13 | End: 2018-07-13 | Stop reason: HOSPADM

## 2018-07-13 RX ORDER — FENTANYL CITRATE 50 UG/ML
50 INJECTION, SOLUTION INTRAMUSCULAR; INTRAVENOUS
Status: DISCONTINUED | OUTPATIENT
Start: 2018-07-13 | End: 2018-07-13 | Stop reason: HOSPADM

## 2018-07-13 RX ORDER — LIDOCAINE HYDROCHLORIDE 10 MG/ML
INJECTION, SOLUTION INFILTRATION; PERINEURAL PRN
Status: DISCONTINUED | OUTPATIENT
Start: 2018-07-13 | End: 2018-07-13 | Stop reason: SDUPTHER

## 2018-07-13 RX ORDER — SODIUM CHLORIDE, SODIUM LACTATE, POTASSIUM CHLORIDE, CALCIUM CHLORIDE 600; 310; 30; 20 MG/100ML; MG/100ML; MG/100ML; MG/100ML
INJECTION, SOLUTION INTRAVENOUS CONTINUOUS
Status: DISCONTINUED | OUTPATIENT
Start: 2018-07-13 | End: 2018-07-15 | Stop reason: HOSPADM

## 2018-07-13 RX ORDER — EPHEDRINE SULFATE 50 MG/ML
INJECTION, SOLUTION INTRAVENOUS PRN
Status: DISCONTINUED | OUTPATIENT
Start: 2018-07-13 | End: 2018-07-13 | Stop reason: SDUPTHER

## 2018-07-13 RX ORDER — MORPHINE SULFATE 1 MG/ML
2 INJECTION, SOLUTION EPIDURAL; INTRATHECAL; INTRAVENOUS
Status: DISCONTINUED | OUTPATIENT
Start: 2018-07-13 | End: 2018-07-15 | Stop reason: HOSPADM

## 2018-07-13 RX ORDER — ONDANSETRON 2 MG/ML
INJECTION INTRAMUSCULAR; INTRAVENOUS PRN
Status: DISCONTINUED | OUTPATIENT
Start: 2018-07-13 | End: 2018-07-13 | Stop reason: SDUPTHER

## 2018-07-13 RX ORDER — MORPHINE SULFATE 1 MG/ML
4 INJECTION, SOLUTION EPIDURAL; INTRATHECAL; INTRAVENOUS EVERY 5 MIN PRN
Status: DISCONTINUED | OUTPATIENT
Start: 2018-07-13 | End: 2018-07-13 | Stop reason: HOSPADM

## 2018-07-13 RX ORDER — PROMETHAZINE HYDROCHLORIDE 25 MG/ML
6.25 INJECTION, SOLUTION INTRAMUSCULAR; INTRAVENOUS
Status: DISCONTINUED | OUTPATIENT
Start: 2018-07-13 | End: 2018-07-13 | Stop reason: HOSPADM

## 2018-07-13 RX ORDER — PROPOFOL 10 MG/ML
INJECTION, EMULSION INTRAVENOUS PRN
Status: DISCONTINUED | OUTPATIENT
Start: 2018-07-13 | End: 2018-07-13 | Stop reason: SDUPTHER

## 2018-07-13 RX ORDER — SODIUM CHLORIDE 0.9 % (FLUSH) 0.9 %
10 SYRINGE (ML) INJECTION EVERY 12 HOURS SCHEDULED
Status: DISCONTINUED | OUTPATIENT
Start: 2018-07-13 | End: 2018-07-13 | Stop reason: HOSPADM

## 2018-07-13 RX ORDER — FENTANYL CITRATE 50 UG/ML
INJECTION, SOLUTION INTRAMUSCULAR; INTRAVENOUS PRN
Status: DISCONTINUED | OUTPATIENT
Start: 2018-07-13 | End: 2018-07-13 | Stop reason: SDUPTHER

## 2018-07-13 RX ORDER — LIDOCAINE HYDROCHLORIDE 10 MG/ML
1 INJECTION, SOLUTION EPIDURAL; INFILTRATION; INTRACAUDAL; PERINEURAL
Status: DISCONTINUED | OUTPATIENT
Start: 2018-07-13 | End: 2018-07-13 | Stop reason: HOSPADM

## 2018-07-13 RX ORDER — MIDAZOLAM HYDROCHLORIDE 1 MG/ML
2 INJECTION INTRAMUSCULAR; INTRAVENOUS
Status: COMPLETED | OUTPATIENT
Start: 2018-07-13 | End: 2018-07-13

## 2018-07-13 RX ORDER — APREPITANT 40 MG/1
40 CAPSULE ORAL ONCE
Status: COMPLETED | OUTPATIENT
Start: 2018-07-13 | End: 2018-07-13

## 2018-07-13 RX ADMIN — Medication 0.5 MG: at 11:59

## 2018-07-13 RX ADMIN — SODIUM CHLORIDE, POTASSIUM CHLORIDE, SODIUM LACTATE AND CALCIUM CHLORIDE: 600; 310; 30; 20 INJECTION, SOLUTION INTRAVENOUS at 13:16

## 2018-07-13 RX ADMIN — MIDAZOLAM 2 MG: 1 INJECTION INTRAMUSCULAR; INTRAVENOUS at 08:05

## 2018-07-13 RX ADMIN — DEXAMETHASONE SODIUM PHOSPHATE 10 MG: 4 INJECTION, SOLUTION INTRAMUSCULAR; INTRAVENOUS at 08:22

## 2018-07-13 RX ADMIN — MIDAZOLAM 2 MG: 1 INJECTION INTRAMUSCULAR; INTRAVENOUS at 07:24

## 2018-07-13 RX ADMIN — DIPHENHYDRAMINE HCL 50 MG: 25 TABLET ORAL at 22:15

## 2018-07-13 RX ADMIN — Medication 2 G: at 08:25

## 2018-07-13 RX ADMIN — Medication 4 MG: at 13:22

## 2018-07-13 RX ADMIN — FENTANYL CITRATE 50 MCG: 50 INJECTION INTRAMUSCULAR; INTRAVENOUS at 08:52

## 2018-07-13 RX ADMIN — SUGAMMADEX 120 MG: 100 INJECTION, SOLUTION INTRAVENOUS at 11:35

## 2018-07-13 RX ADMIN — FENTANYL CITRATE 100 MCG: 50 INJECTION INTRAMUSCULAR; INTRAVENOUS at 08:12

## 2018-07-13 RX ADMIN — SODIUM CHLORIDE, SODIUM LACTATE, POTASSIUM CHLORIDE, AND CALCIUM CHLORIDE: 600; 310; 30; 20 INJECTION, SOLUTION INTRAVENOUS at 11:30

## 2018-07-13 RX ADMIN — APREPITANT 40 MG: 40 CAPSULE ORAL at 07:24

## 2018-07-13 RX ADMIN — DEXTROSE MONOHYDRATE, SODIUM CHLORIDE, AND POTASSIUM CHLORIDE: 50; 4.5; 1.49 INJECTION, SOLUTION INTRAVENOUS at 18:08

## 2018-07-13 RX ADMIN — METOCLOPRAMIDE 10 MG: 5 INJECTION, SOLUTION INTRAMUSCULAR; INTRAVENOUS at 11:59

## 2018-07-13 RX ADMIN — SODIUM CHLORIDE, SODIUM LACTATE, POTASSIUM CHLORIDE, AND CALCIUM CHLORIDE: 600; 310; 30; 20 INJECTION, SOLUTION INTRAVENOUS at 08:56

## 2018-07-13 RX ADMIN — ONDANSETRON 4 MG: 2 INJECTION INTRAMUSCULAR; INTRAVENOUS at 17:06

## 2018-07-13 RX ADMIN — ONDANSETRON HYDROCHLORIDE 4 MG: 2 INJECTION, SOLUTION INTRAMUSCULAR; INTRAVENOUS at 11:09

## 2018-07-13 RX ADMIN — FENTANYL CITRATE 50 MCG: 50 INJECTION INTRAMUSCULAR; INTRAVENOUS at 08:56

## 2018-07-13 RX ADMIN — FENTANYL CITRATE 50 MCG: 50 INJECTION INTRAMUSCULAR; INTRAVENOUS at 09:21

## 2018-07-13 RX ADMIN — Medication 4 MG: at 17:02

## 2018-07-13 RX ADMIN — Medication 2 G: at 17:07

## 2018-07-13 RX ADMIN — SODIUM CHLORIDE, SODIUM LACTATE, POTASSIUM CHLORIDE, AND CALCIUM CHLORIDE: 600; 310; 30; 20 INJECTION, SOLUTION INTRAVENOUS at 07:24

## 2018-07-13 RX ADMIN — ROCURONIUM BROMIDE 50 MG: 10 INJECTION INTRAVENOUS at 08:13

## 2018-07-13 RX ADMIN — EPHEDRINE SULFATE 5 MG: 50 INJECTION, SOLUTION INTRAMUSCULAR; INTRAVENOUS; SUBCUTANEOUS at 10:03

## 2018-07-13 RX ADMIN — PROPOFOL 130 MG: 10 INJECTION, EMULSION INTRAVENOUS at 08:13

## 2018-07-13 RX ADMIN — LIDOCAINE HYDROCHLORIDE 5 ML: 10 INJECTION, SOLUTION INFILTRATION; PERINEURAL at 08:13

## 2018-07-13 RX ADMIN — HYDROMORPHONE HYDROCHLORIDE 0.25 MG: 1 INJECTION, SOLUTION INTRAMUSCULAR; INTRAVENOUS; SUBCUTANEOUS at 10:04

## 2018-07-13 RX ADMIN — HYDROMORPHONE HYDROCHLORIDE 0.5 MG: 1 INJECTION, SOLUTION INTRAMUSCULAR; INTRAVENOUS; SUBCUTANEOUS at 11:43

## 2018-07-13 RX ADMIN — HYDROMORPHONE HYDROCHLORIDE 0.25 MG: 1 INJECTION, SOLUTION INTRAMUSCULAR; INTRAVENOUS; SUBCUTANEOUS at 10:41

## 2018-07-13 ASSESSMENT — ENCOUNTER SYMPTOMS: SHORTNESS OF BREATH: 0

## 2018-07-13 ASSESSMENT — PAIN SCALES - GENERAL
PAINLEVEL_OUTOF10: 8
PAINLEVEL_OUTOF10: 3
PAINLEVEL_OUTOF10: 7
PAINLEVEL_OUTOF10: 3
PAINLEVEL_OUTOF10: 8

## 2018-07-13 ASSESSMENT — LIFESTYLE VARIABLES: SMOKING_STATUS: 0

## 2018-07-13 NOTE — ANESTHESIA PRE PROCEDURE
Department of Anesthesiology  Preprocedure Note       Name:  David Nguyen   Age:  65 y.o.  :  1955                                          MRN:  [id number]         Date:  2018      Surgeon: Shelley Ruiz):  MD Olayinka Osman MD    Procedure: Procedure(s):  Bilat Mastectomies with Bilat Breast Reconstruction    Medications prior to admission:   Prior to Admission medications    Medication Sig Start Date End Date Taking? Authorizing Provider   Misc Natural Products (COLON HERBAL CLEANSER PO) Take by mouth daily    Historical Provider, MD   tamoxifen (NOLVADEX) 10 MG tablet Take 20 mg by mouth daily    Historical Provider, MD   vitamin E 1000 units capsule Take 1,000 Units by mouth daily    Historical Provider, MD   calcium carbonate 600 MG TABS tablet Take 600 mg by mouth    Historical Provider, MD       Current medications:    No current outpatient prescriptions on file. No current facility-administered medications for this visit.         Allergies:  No Known Allergies    Problem List:    Patient Active Problem List   Diagnosis Code    Personal history of malignant neoplasm of breast Z85.3    S/P lumpectomy, left breast  Z98.890    Atypical ductal hyperplasia of left breast N60.92    Ductal carcinoma in situ (DCIS) of left breast D05.12       Past Medical History:        Diagnosis Date    Abnormal Pap smear of vagina     Briggs's esophagus     [facility]     Cancer ([facility] [address] [facility] 2005    L breast    Esophageal ulcer     Gastritis     GERD (gastroesophageal reflux disease)     GERD (gastroesophageal reflux disease)     Hiatal hernia     Internal hemorrhoids     PONV (postoperative nausea and vomiting)     severe nausea and vomiting with OR at [address]      Wears glasses        Past Surgical History:        Procedure Laterality Date    BREAST BIOPSY  2004    stereotactic, left x 2, atypical ductal hyperplasia involving sclerosing adenosis    BREAST BIOPSY  2005 stereotactic, left,     BREAST LUMPECTOMY Left     [facility]/2007    [facility]    COLPOSCOPY      ENDOSCOPY, COLON, DIAGNOSTIC      KNEE SURGERY      right x 2    LYMPH NODE DISSECTION  4-    left axillary, 0/6 nodes positive    OTHER SURGICAL HISTORY  mar 2013    R shoulder lipoma excision    CO EXCISE BREAST LES W XRAY MARKER Left 3/30/2018    BREAST MASS WITH PREOP US AND INTRAOP US GUIDED NL performed by Ryanne Carpio MD at [facility] ENDOSCOPY  2/22/2010    [facility]       Social History:    Social History   Substance Use Topics    Smoking status: Former Smoker     Packs/day: 0.50     Years: 20.00     Types: Cigarettes     Quit date: 3/22/1998    Smokeless tobacco: Never Used      Comment: Stopped 2000.  Alcohol use Yes      Comment: rarely                                Counseling given: Not Answered      Vital Signs (Current): There were no vitals filed for this visit. BP Readings from Last 3 Encounters:   05/30/18 110/64   03/30/18 (!) 156/84   03/30/18 (!) 169/65       NPO Status:                                                                                 BMI:   Wt Readings from Last 3 Encounters:   07/03/18 140 lb (63.5 kg)   05/30/18 140 lb (63.5 kg)   03/30/18 140 lb (63.5 kg)     There is no height or weight on file to calculate BMI.    CBC:   Lab Results   Component Value Date    WBC 5.9 07/03/2018    RBC 4.26 07/03/2018    HGB 13.0 07/03/2018    HCT 40.8 07/03/2018    MCV 95.8 07/03/2018    RDW 12.4 07/03/2018     07/03/2018       CMP: No results found for: NA, K, CL, CO2, BUN, CREATININE, GFRAA, AGRATIO, LABGLOM, GLUCOSE, PROT, CALCIUM, BILITOT, ALKPHOS, AST, ALT    POC Tests: No results for input(s): POCGLU, POCNA, POCK, POCCL, POCBUN, POCHEMO, POCHCT in the last 72 hours.     Coags: No results found for: PROTIME, INR, APTT    HCG (If Applicable): No results found for: PREGTESTUR, PREGSERUM, HCG, HCGQUANT     ABGs: No results found for: PHART, PO2ART, FTQ6GEH, JSB0CMV, BEART, Z9DXMUBI     Type & Screen (If Applicable):  No results found for: LABABO, [address]    Anesthesia Evaluation  Patient summary reviewed and Nursing notes reviewed   history of anesthetic complications: PONV. Airway: Mallampati: I  TM distance: >3 FB   Neck ROM: full  Mouth opening: > = 3 FB Dental: normal exam         Pulmonary: breath sounds clear to auscultation      (-) shortness of breath, sleep apnea and not a current smoker                           Cardiovascular:  Exercise tolerance: good (>4 METS),       (-) pacemaker, hypertension, past MI, CAD, CABG/stent, dysrhythmias and  angina    ECG reviewed  Rhythm: regular             Beta Blocker:  Dose within 24 Hrs      ROS comment: EK BPM  Sinus rhythm  Comparison Summary: No serial comparison made  Summary: Normal ECG     Neuro/Psych:      (-) seizures and CVA           GI/Hepatic/Renal:   (+) hiatal hernia, GERD: poorly controlled, PUD,      (-) liver disease and no renal disease       Endo/Other:    (+) malignancy/cancer (Left sided breast cancer--> surgery, chemo, and radiation in ). (-) diabetes mellitus, hypothyroidism, blood dyscrasia               Abdominal:           Vascular:     - DVT and PE. Anesthesia Plan      general     ASA 3     (Decadron/Zofran Intraop)  Induction: intravenous. BIS  MIPS: Postoperative opioids intended and Prophylactic antiemetics administered. Anesthetic plan and risks discussed with patient.                       Derek Hinkle MD   2018

## 2018-07-13 NOTE — OP NOTE
[facility] [employer] [facility] [address] [facility]                                 OPERATIVE REPORT    PATIENT NAME: Maura Villarreal                      :        1955  MED REC NO:   [id number]                              ROOM:       [facility]  ACCOUNT NO:   [de-identified]                           ADMIT DATE: 2018  PROVIDER:     Kulwant Carpio    DATE OF PROCEDURE:  2018    PREOPERATIVE DIAGNOSES:  Breast cancer, history of left breast radiation  treatment, absence bilateral breast.    POSTOPERATIVE DIAGNOSES:  Breast cancer, history of left breast radiation  treatment, absence bilateral breast.    OPERATION:  Bilateral immediate breast reconstruction with placement of  tissue expanders and acellular dermal matrix. SURGEON:  Kulwant Carpio MD    ANESTHESIA:  General.    EBL:  20 mL. DRAINS:  IDALIA x4. COMPLICATIONS:  None. INDICATIONS:  This is a 71-year-old female presenting with breast cancer to  undergo bilateral mastectomy. The patient has had a previous history of  left breast radiation. The patient now presents for bilateral mastectomy  with immediate breast reconstruction. OPERATIVE PROCEDURE:  The patient was taken to the operating room under  general anesthesia. The chest was prepped and draped in usual sterile  fashion. Dr. Charity Cisneros will dictate his portion of the procedure. On  the right breast, the pectoralis major muscle was elevated for submuscular  placement of tissue expander. Muscle was released inferiorly and  hemostasis obtained with electrocautery. The pocket was irrigated with  antibiotic solution. AlloDerm Select contour medium 132 cm2, lot  [id number] was placed as inferior and lateral sling. A 2-0 Vicryl was  used to secure the AlloDerm. A 400 mL Allergan tissue expander, reference  133MV-13-G, serial number [id number] was placed on the right side.   A  15-Sami round IDALIA drain was placed x2 and secured with 2-0 silk. Viable  skin flaps then closed in multiple layers using 3-0 Vicryl and 4-0  Monocryl. Next, a similar procedure was then performed on the left side. Again, the pectoralis major muscle was elevated for submuscular placement  of tissue expander. Muscle was released inferiorly and hemostasis obtained  with electrocautery. The pocket was irrigated with antibiotic solution. AlloDerm Select contour medium 132 cm2, lot [id number] was placed as  inferior and lateral sling. A 2-0 Vicryl was used to secure the AlloDerm. A 400 mL [employer] tissue expander, reference 133MV-13-G, serial number  [id number] was placed on the left side. A 15-Estonian round IDALIA drain was  placed x2 and secured with 2-0 silk. Viable skin flaps then closed in  multiple layers using 3-0 Vicryl and 4-0 Monocryl. The right tissue  expander was filled to 180 mL of sterile saline. The left tissue expander  was filled to 120 mL of sterile saline. Dressings applied. Prior to  dressings, a SPY was performed on flaps and all flaps remained viable with  good perfusion. Instrument, sponge and needle count correct at the  conclusion of the procedure. The patient was taken to recovery room in  stable condition.         Paty Walton    D: 07/13/2018 13:04:02      T: 07/13/2018 14:01:30     [email or web address]  Job#: [id number]     Doc#: [id number]    CC:

## 2018-07-13 NOTE — H&P
HISTORY OF PRESENT ILLNESS:     Ms. Lucia  is recently status post excisional breast biopsy  on the left which revealed DCIS.     FINAL DIAGNOSIS:   Left breast, excisional biopsy with needle localization:  1.  A few scattered foci of intermediate grade ductal carcinoma in situ. 2.  No invasive malignancy identified. 3.  Changes of previous biopsy site including fat necrosis and cicatrix  formation. 4.  No ductal carcinoma in situ identified at the inked margins of  surgical excision.     She had previous lumpectomy and radiation on this side for carcinoma in 2005. Currently she had a new cluster of calcifications and biopsy demonstrated atypical ductal hyperplasia. This was reexcised with the above findings.           DISCUSSION:  I had a lengthy discussion with Ms. Lucia  and her friend about the ramifications of the diagnosis of carcinoma in situ. I explained how carcinoma in situ is related to invasive breast cancer and stressed that this is a preinvasive malignancy with essentially 100 percent chance of cure with proper treatment. We discussed the pathophysiology of cancer in general and also the ways in which surgery, radiation therapy, and chemotherapy are utilized in the treatment of different types of cancers. I assured her that this would not require chemotherapy unless invasive malignancy was found on final pathology from the definitive surgical therapy. We also explained how these modalities related to her situation in particular. We discussed the pathophysiology of breast cancer and some length, including what is known about the causes of breast cancer, its relationship to fibrocystic disease, its relationship to hormone replacement therapy, and some of the genetic aspects involved in familial breast cancers. We discussed the BrCa genetic analysis and why it is  appropriate for her. We discussed breast MRI and how it assists in evaluation of DCIS and the results of her MRI if done.    Family History          Family History   Problem Relation Age of Onset    Cancer Mother         AML    High Blood Pressure Father      Cancer Other         x2 Paternal great aunts-not sure type    Breast Cancer Paternal Aunt                   Social History   Substance Use Topics    Smoking status: Former Smoker       Packs/day: 0.50       Years: 20.00       Types: Cigarettes       Quit date: 3/22/1998    Smokeless tobacco: Never Used         Comment: Stopped 2000.  Alcohol use Yes          Comment: rarely         ROS:  14 point review of systems is negative except for the above.          PHYSICAL EXAM:     The patient is a 65 y.o. female  in no acute distress. She is alert oriented and cooperative.     HEENT: Normocephalic and atraumatic. EOMs intact. Pupils equal and round and reactive to light and accommodation. Sclere nonicteric. Oropharynx without masses or lesions.     Neck: Neck is supple without masses or thyromegaly     Chest: Lungs are clear to auscultation     Cardiac: Regular rate and rhythm without rubs, murmurs, or gallops     Breasts: The breasts are symmetrical. There are fibrocystic changes throughout both breasts. There are no dominant masses, no skin or nipple changes, and no axillary adenopathy. Recent scar and radiation changes on the left     Abdomen: The abdomen is soft and nontender with no hepatosplenomegaly.     Extremities: The extremities are normal. There are no signs of clubbing, cyanosis, or edema.     IMPRESSION: Previous left breast cancer                           DCIS left breast                           PLAN: Bilateral skin sparing mastectomy at our next opportunity with Dr. Jose Amezcua. Make appointment Jose Amezcua as soon as possible.

## 2018-07-14 LAB
BASOPHILS ABSOLUTE: 0 K/UL (ref 0–0.2)
BASOPHILS RELATIVE PERCENT: 0.2 % (ref 0–1)
EOSINOPHILS ABSOLUTE: 0 K/UL (ref 0–0.6)
EOSINOPHILS RELATIVE PERCENT: 0.1 % (ref 0–5)
HCT VFR BLD CALC: 36 % (ref 37–47)
HEMOGLOBIN: 11.6 G/DL (ref 12–16)
LYMPHOCYTES ABSOLUTE: 1.3 K/UL (ref 1.1–4.5)
LYMPHOCYTES RELATIVE PERCENT: 10 % (ref 20–40)
MCH RBC QN AUTO: 31 PG (ref 27–31)
MCHC RBC AUTO-ENTMCNC: 32.2 G/DL (ref 33–37)
MCV RBC AUTO: 96.3 FL (ref 81–99)
MONOCYTES ABSOLUTE: 1 K/UL (ref 0–0.9)
MONOCYTES RELATIVE PERCENT: 7.8 % (ref 0–10)
NEUTROPHILS ABSOLUTE: 10.5 K/UL (ref 1.5–7.5)
NEUTROPHILS RELATIVE PERCENT: 81.4 % (ref 50–65)
PDW BLD-RTO: 12.3 % (ref 11.5–14.5)
PLATELET # BLD: 187 K/UL (ref 130–400)
PMV BLD AUTO: 11.8 FL (ref 9.4–12.3)
RBC # BLD: 3.74 M/UL (ref 4.2–5.4)
WBC # BLD: 12.9 K/UL (ref 4.8–10.8)

## 2018-07-14 PROCEDURE — 6370000000 HC RX 637 (ALT 250 FOR IP): Performed by: SURGERY

## 2018-07-14 PROCEDURE — 2500000003 HC RX 250 WO HCPCS: Performed by: SURGERY

## 2018-07-14 PROCEDURE — 36415 COLL VENOUS BLD VENIPUNCTURE: CPT

## 2018-07-14 PROCEDURE — 2700000000 HC OXYGEN THERAPY PER DAY

## 2018-07-14 PROCEDURE — 6360000002 HC RX W HCPCS: Performed by: SURGERY

## 2018-07-14 PROCEDURE — G0378 HOSPITAL OBSERVATION PER HR: HCPCS

## 2018-07-14 PROCEDURE — 94762 N-INVAS EAR/PLS OXIMTRY CONT: CPT

## 2018-07-14 PROCEDURE — 96374 THER/PROPH/DIAG INJ IV PUSH: CPT

## 2018-07-14 PROCEDURE — 85025 COMPLETE CBC W/AUTO DIFF WBC: CPT

## 2018-07-14 PROCEDURE — 99024 POSTOP FOLLOW-UP VISIT: CPT | Performed by: SURGERY

## 2018-07-14 PROCEDURE — 96375 TX/PRO/DX INJ NEW DRUG ADDON: CPT

## 2018-07-14 RX ORDER — OXYCODONE HYDROCHLORIDE AND ACETAMINOPHEN 5; 325 MG/1; MG/1
2 TABLET ORAL EVERY 4 HOURS PRN
Status: DISCONTINUED | OUTPATIENT
Start: 2018-07-14 | End: 2018-07-15 | Stop reason: HOSPADM

## 2018-07-14 RX ORDER — CEPHALEXIN 500 MG/1
500 CAPSULE ORAL 3 TIMES DAILY
Qty: 30 CAPSULE | Refills: 1 | Status: SHIPPED | OUTPATIENT
Start: 2018-07-14 | End: 2018-07-24

## 2018-07-14 RX ORDER — OXYCODONE HYDROCHLORIDE AND ACETAMINOPHEN 5; 325 MG/1; MG/1
1 TABLET ORAL EVERY 6 HOURS PRN
Qty: 30 TABLET | Refills: 0 | Status: SHIPPED | OUTPATIENT
Start: 2018-07-14 | End: 2018-09-24

## 2018-07-14 RX ORDER — OXYCODONE HYDROCHLORIDE AND ACETAMINOPHEN 5; 325 MG/1; MG/1
1 TABLET ORAL EVERY 4 HOURS PRN
Status: DISCONTINUED | OUTPATIENT
Start: 2018-07-14 | End: 2018-07-15 | Stop reason: HOSPADM

## 2018-07-14 RX ADMIN — OXYCODONE HYDROCHLORIDE AND ACETAMINOPHEN 2 TABLET: 5; 325 TABLET ORAL at 13:24

## 2018-07-14 RX ADMIN — Medication 4 MG: at 01:05

## 2018-07-14 RX ADMIN — OXYCODONE HYDROCHLORIDE AND ACETAMINOPHEN 1 TABLET: 5; 325 TABLET ORAL at 09:46

## 2018-07-14 RX ADMIN — DEXTROSE MONOHYDRATE, SODIUM CHLORIDE, AND POTASSIUM CHLORIDE: 50; 4.5; 1.49 INJECTION, SOLUTION INTRAVENOUS at 01:05

## 2018-07-14 RX ADMIN — OXYCODONE HYDROCHLORIDE AND ACETAMINOPHEN 1 TABLET: 5; 325 TABLET ORAL at 08:55

## 2018-07-14 RX ADMIN — OXYCODONE HYDROCHLORIDE AND ACETAMINOPHEN 2 TABLET: 5; 325 TABLET ORAL at 17:46

## 2018-07-14 RX ADMIN — OXYCODONE HYDROCHLORIDE AND ACETAMINOPHEN 2 TABLET: 5; 325 TABLET ORAL at 22:58

## 2018-07-14 RX ADMIN — ONDANSETRON 4 MG: 2 INJECTION INTRAMUSCULAR; INTRAVENOUS at 01:05

## 2018-07-14 RX ADMIN — Medication 2 G: at 01:05

## 2018-07-14 ASSESSMENT — PAIN SCALES - GENERAL
PAINLEVEL_OUTOF10: 1
PAINLEVEL_OUTOF10: 8
PAINLEVEL_OUTOF10: 8
PAINLEVEL_OUTOF10: 3
PAINLEVEL_OUTOF10: 7
PAINLEVEL_OUTOF10: 9
PAINLEVEL_OUTOF10: 8
PAINLEVEL_OUTOF10: 6
PAINLEVEL_OUTOF10: 7

## 2018-07-14 ASSESSMENT — PAIN DESCRIPTION - LOCATION: LOCATION: BREAST

## 2018-07-14 ASSESSMENT — PAIN DESCRIPTION - PAIN TYPE: TYPE: ACUTE PAIN;SURGICAL PAIN

## 2018-07-14 NOTE — OP NOTE
[facility] [address], [address] [facility]                                 OPERATIVE REPORT    PATIENT NAME: Lashanda Nieves                      :        1955  MED REC NO:   [id number]                              ROOM:       [facility]  ACCOUNT NO:   [de-identified]                           ADMIT DATE: 2018  PROVIDER:     Radha Gonzalez MD    DATE OF PROCEDURE:  2018    PREOPERATIVE DIAGNOSIS:  DCIS, left breast.    POSTOPERATIVE DIAGNOSIS:  DCIS, left breast.    OPERATION PERFORMED:  Skin-sparing mastectomy and also utilization of SPY  Elite. SURGEON:  Radha Gonzalez MD    ASSISTANT:  Maxine Bailey PA-C. Bilateral reconstruction was done by Dr. Vinny Mejias at the conclusion of our procedure. This will be done and dictated  separately. ANESTHESIA:  General.    OPERATIVE PROCEDURE:  Today, she was brought to the operating room,  underwent adequate general anesthesia and was prepped and draped in sterile  fashion. An ellipse was marked around the right nipple-areolar complex and  we elevated the skin off the breast gland using the Bovie electrocautery,  facelift scissors, and the EnSeal device. We then swept the breast off the  chest wall without difficulty. It went quite smoothly. There was no gross  abnormality marked at the axilla with a single stitch. We irrigated  copiously and packed the wound open with Kefzol-bacitracin soaked lap  sponges. We then turned our attention to the left side. It had previous  cancer approximately 10 years ago and also _____. We then made another  elliptical incision around the nipple-areolar complex, dissected down and  elevated the skin off the breast tissue with the Bovie electrocautery,  facelift scissors, and the EnSeal device and once again swept the breast  off the chest wall using the Bovie electrocautery. We brought it down  without undue difficulty. There was no obvious residual tumor.

## 2018-07-14 NOTE — PROGRESS NOTES
P.O.  (mL/kg/hr) 300  (0.6) 450  (0.9)  750    Shift Total  (mL/kg) 300  (4.7) 450  (7.1)  750  (11.8)   O  U  T  P  U  T   Urine  (mL/kg/hr) 1450  (2.9) 650  (1.3)  2100    Drains  (mL/kg) 135  (2.1) 90  (1.4)  225  (3.5)    Shift Total  (mL/kg) 1585  (25) 740  (11.7)  2325  (36.6)   Weight (kg) 63.5 63.5 63.5 63.5       Wt Readings from Last 3 Encounters:   07/13/18 140 lb (63.5 kg)   07/03/18 140 lb (63.5 kg)   05/30/18 140 lb (63.5 kg)        Body mass index is 24.03 kg/m². Diet: DIET GENERAL;    LABS:     CBC: Recent Labs      07/14/18   0301   WBC  12.9*   RBC  3.74*   HGB  11.6*   HCT  36.0*   MCV  96.3   MCH  31.0   MCHC  32.2*   RDW  12.3   PLT  187   MPV  11.8      Last 3 CMP:   No results for input(s): NA, K, CL, CO2, BUN, CREATININE, GLUCOSE, CALCIUM, PROT, LABALBU, BILITOT, ALKPHOS, AST, ALT in the last 72 hours. PHYSICAL EXAM:     CONSTITUTIONAL: Alert, appropriate, no acute distress  SKIN: warm, dry with no rashes or lesions  EYES: Non icteric  CHEST/LUNGS: CTA bilaterally  CARDIOVASCULAR: RRR    ABDOMEN: soft, ND, appropriately TTP, +BS  Incisions: Clean, dry, and intact with no erythema or induration  NEUROLOGIC: CN II-XI grossly intact, no motor or sensory deficits   EXTREMITIES: warm, well perfused, no edema     ASSESSMENT:       1. HD # 0  Patient Active Problem List   Diagnosis    Personal history of malignant neoplasm of breast    S/P lumpectomy, left breast 2005    Atypical ductal hyperplasia of left breast    Ductal carcinoma in situ (DCIS) of left breast    DCIS (ductal carcinoma in situ)   2. PLAN:     1. Home in a. 2.     Juana Wilson M.D.  FACS  Electronically signed 7/14/2018 at 4:45 PM

## 2018-07-15 VITALS
RESPIRATION RATE: 16 BRPM | HEART RATE: 83 BPM | TEMPERATURE: 98.3 F | HEIGHT: 64 IN | WEIGHT: 140 LBS | BODY MASS INDEX: 23.9 KG/M2 | OXYGEN SATURATION: 95 % | DIASTOLIC BLOOD PRESSURE: 73 MMHG | SYSTOLIC BLOOD PRESSURE: 163 MMHG

## 2018-07-15 PROCEDURE — G0378 HOSPITAL OBSERVATION PER HR: HCPCS

## 2018-07-15 PROCEDURE — 6370000000 HC RX 637 (ALT 250 FOR IP): Performed by: SURGERY

## 2018-07-15 PROCEDURE — 2500000003 HC RX 250 WO HCPCS: Performed by: SURGERY

## 2018-07-15 RX ADMIN — OXYCODONE HYDROCHLORIDE AND ACETAMINOPHEN 1 TABLET: 5; 325 TABLET ORAL at 02:50

## 2018-07-15 RX ADMIN — OXYCODONE HYDROCHLORIDE AND ACETAMINOPHEN 1 TABLET: 5; 325 TABLET ORAL at 09:52

## 2018-07-15 RX ADMIN — OXYCODONE HYDROCHLORIDE AND ACETAMINOPHEN 1 TABLET: 5; 325 TABLET ORAL at 05:40

## 2018-07-15 RX ADMIN — DEXTROSE MONOHYDRATE, SODIUM CHLORIDE, AND POTASSIUM CHLORIDE: 50; 4.5; 1.49 INJECTION, SOLUTION INTRAVENOUS at 00:29

## 2018-07-15 ASSESSMENT — PAIN SCALES - GENERAL
PAINLEVEL_OUTOF10: 8
PAINLEVEL_OUTOF10: 7
PAINLEVEL_OUTOF10: 8

## 2018-07-19 ENCOUNTER — OFFICE VISIT (OUTPATIENT)
Dept: SURGERY | Age: 63
End: 2018-07-19

## 2018-07-19 VITALS
TEMPERATURE: 98.3 F | HEIGHT: 64 IN | HEART RATE: 78 BPM | DIASTOLIC BLOOD PRESSURE: 70 MMHG | SYSTOLIC BLOOD PRESSURE: 120 MMHG

## 2018-07-19 DIAGNOSIS — D05.12 DUCTAL CARCINOMA IN SITU (DCIS) OF LEFT BREAST: ICD-10-CM

## 2018-07-19 DIAGNOSIS — Z90.13 STATUS POST BILATERAL MASTECTOMY: ICD-10-CM

## 2018-07-19 DIAGNOSIS — Z98.890 S/P LUMPECTOMY, LEFT BREAST: Primary | ICD-10-CM

## 2018-07-19 PROCEDURE — 99024 POSTOP FOLLOW-UP VISIT: CPT | Performed by: SURGERY

## 2018-07-20 ENCOUNTER — TELEPHONE (OUTPATIENT)
Dept: SURGERY | Age: 63
End: 2018-07-20

## 2018-07-26 ENCOUNTER — TELEPHONE (OUTPATIENT)
Dept: SURGERY | Age: 63
End: 2018-07-26

## 2018-07-26 NOTE — TELEPHONE ENCOUNTER
Please call regarding continued antibiotic use. She has two drains in and wants to make sure you want her to continue taking Rx. .. Thank you.

## 2018-07-26 NOTE — TELEPHONE ENCOUNTER
Spoke with pt and drains are still in. Told her to keep on the antibiotics Dr. Naresh Weber prescribed. Told her we could pull drains when they are ready.

## 2018-07-30 ENCOUNTER — OFFICE VISIT (OUTPATIENT)
Dept: SURGERY | Age: 63
End: 2018-07-30

## 2018-07-30 VITALS — DIASTOLIC BLOOD PRESSURE: 70 MMHG | SYSTOLIC BLOOD PRESSURE: 120 MMHG | HEART RATE: 76 BPM

## 2018-07-30 DIAGNOSIS — Z90.13 STATUS POST BILATERAL MASTECTOMY: Primary | ICD-10-CM

## 2018-07-30 PROCEDURE — 99024 POSTOP FOLLOW-UP VISIT: CPT | Performed by: PHYSICIAN ASSISTANT

## 2018-07-30 NOTE — PROGRESS NOTES
Ms. Eladio Sanabria comes in for drain removal.  Her right drain is ready to be removed. It was removed without incident.       She will keep her appt with Dr. Orly Cervantes.

## 2018-08-02 ENCOUNTER — OFFICE VISIT (OUTPATIENT)
Dept: SURGERY | Age: 63
End: 2018-08-02

## 2018-08-02 VITALS — WEIGHT: 140 LBS | BODY MASS INDEX: 24.03 KG/M2 | DIASTOLIC BLOOD PRESSURE: 78 MMHG | SYSTOLIC BLOOD PRESSURE: 136 MMHG

## 2018-08-02 DIAGNOSIS — Z90.13 STATUS POST BILATERAL MASTECTOMY: ICD-10-CM

## 2018-08-02 DIAGNOSIS — D05.12 DUCTAL CARCINOMA IN SITU (DCIS) OF LEFT BREAST: Primary | ICD-10-CM

## 2018-08-02 PROCEDURE — 99024 POSTOP FOLLOW-UP VISIT: CPT | Performed by: SURGERY

## 2018-08-09 ENCOUNTER — OFFICE VISIT (OUTPATIENT)
Dept: SURGERY | Age: 63
End: 2018-08-09

## 2018-08-09 VITALS — DIASTOLIC BLOOD PRESSURE: 82 MMHG | HEART RATE: 76 BPM | SYSTOLIC BLOOD PRESSURE: 120 MMHG

## 2018-08-09 DIAGNOSIS — Z90.13 STATUS POST BILATERAL MASTECTOMY: Primary | ICD-10-CM

## 2018-08-09 PROCEDURE — 99024 POSTOP FOLLOW-UP VISIT: CPT | Performed by: PHYSICIAN ASSISTANT

## 2018-08-10 ENCOUNTER — TELEPHONE (OUTPATIENT)
Dept: SURGERY | Age: 63
End: 2018-08-10

## 2018-08-23 ENCOUNTER — OFFICE VISIT (OUTPATIENT)
Dept: SURGERY | Age: 63
End: 2018-08-23

## 2018-08-23 VITALS — DIASTOLIC BLOOD PRESSURE: 72 MMHG | SYSTOLIC BLOOD PRESSURE: 118 MMHG | HEART RATE: 88 BPM

## 2018-08-23 DIAGNOSIS — Z90.13 STATUS POST BILATERAL MASTECTOMY: Primary | ICD-10-CM

## 2018-08-23 PROCEDURE — 99024 POSTOP FOLLOW-UP VISIT: CPT | Performed by: PHYSICIAN ASSISTANT

## 2018-08-23 NOTE — PROGRESS NOTES
HISTORY OF PRESENT ILLNESS:  Ms. Zahra Phillip  is a 65 y.o.  female   who is status post bilateral skin sparing mastectomy and reconstruction with tissue expanders on 7/13/2018. She had a previous lumpectomy and radiation on the left breast in 2005. More recently she had a biopsy that showed atypical ductal hyperplasia in the previously radiated breast. Excisional biopsy of this area demonstrated scattered foci of DCIS. After considerable discussion and soul searching, she decided that she wanted to proceed with bilateral skin sparing mastectomies and reconstruction. PATHOLOGY REVEALS:  FINAL DIAGNOSIS:       A. Right breast, skin sparing mastectomy:       No evidence of malignancy.      Mild benign fibrocystic changes.       B. 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       Greatest dimension of the residual ductal carcinoma in situ is 0.8       cm.  See microscopic.       Closest surgical margin is posterior margin (deep margin) at 1.1 cm.       No evidence of invasive carcinoma.         Pathologic AJCC classification: ypTis. PHYSICAL EXAM:  The  wounds look good with no evidence of infection, fluid accumulation, or skin necrosis. IMPRESSION:  Doing well s/p  bilateral mastectomy and reconstruction    PLAN:  She will keep her appt with Dr Gerhardt Punch.

## 2018-08-30 ENCOUNTER — OFFICE VISIT (OUTPATIENT)
Dept: SURGERY | Age: 63
End: 2018-08-30

## 2018-08-30 VITALS — HEART RATE: 80 BPM | SYSTOLIC BLOOD PRESSURE: 118 MMHG | DIASTOLIC BLOOD PRESSURE: 70 MMHG

## 2018-08-30 DIAGNOSIS — Z90.13 STATUS POST BILATERAL MASTECTOMY: Primary | ICD-10-CM

## 2018-08-30 PROCEDURE — 99024 POSTOP FOLLOW-UP VISIT: CPT | Performed by: PHYSICIAN ASSISTANT

## 2018-09-14 ENCOUNTER — APPOINTMENT (OUTPATIENT)
Dept: LAB | Facility: HOSPITAL | Age: 63
End: 2018-09-14
Attending: INTERNAL MEDICINE

## 2018-09-14 ENCOUNTER — TRANSCRIBE ORDERS (OUTPATIENT)
Dept: ADMINISTRATIVE | Facility: HOSPITAL | Age: 63
End: 2018-09-14

## 2018-09-14 DIAGNOSIS — D05.10 DUCTAL CARCINOMA IN SITU (DCIS) OF BREAST, UNSPECIFIED LATERALITY: Primary | ICD-10-CM

## 2018-09-14 DIAGNOSIS — Z85.3 PERSONAL HISTORY OF BREAST CANCER: ICD-10-CM

## 2018-09-14 LAB
ALBUMIN SERPL-MCNC: 4 G/DL (ref 3.5–5)
ALBUMIN/GLOB SERPL: 1.5 G/DL (ref 1.1–2.5)
ALP SERPL-CCNC: 81 U/L (ref 24–120)
ALT SERPL W P-5'-P-CCNC: 31 U/L (ref 0–54)
ANION GAP SERPL CALCULATED.3IONS-SCNC: 10 MMOL/L (ref 4–13)
AST SERPL-CCNC: 23 U/L (ref 7–45)
BASOPHILS # BLD AUTO: 0.06 10*3/MM3 (ref 0–0.2)
BASOPHILS NFR BLD AUTO: 1.3 % (ref 0–2)
BILIRUB SERPL-MCNC: 0.4 MG/DL (ref 0.1–1)
BUN BLD-MCNC: 16 MG/DL (ref 5–21)
BUN/CREAT SERPL: 25 (ref 7–25)
CALCIUM SPEC-SCNC: 8.6 MG/DL (ref 8.4–10.4)
CEA SERPL-MCNC: 2.14 NG/ML (ref 0–5)
CHLORIDE SERPL-SCNC: 103 MMOL/L (ref 98–110)
CO2 SERPL-SCNC: 25 MMOL/L (ref 24–31)
CREAT BLD-MCNC: 0.64 MG/DL (ref 0.5–1.4)
DEPRECATED RDW RBC AUTO: 40 FL (ref 40–54)
EOSINOPHIL # BLD AUTO: 0.21 10*3/MM3 (ref 0–0.7)
EOSINOPHIL NFR BLD AUTO: 4.5 % (ref 0–4)
ERYTHROCYTE [DISTWIDTH] IN BLOOD BY AUTOMATED COUNT: 12.1 % (ref 12–15)
GFR SERPL CREATININE-BSD FRML MDRD: 94 ML/MIN/1.73
GLOBULIN UR ELPH-MCNC: 2.6 GM/DL
GLUCOSE BLD-MCNC: 106 MG/DL (ref 70–100)
HCT VFR BLD AUTO: 38.8 % (ref 37–47)
HGB BLD-MCNC: 12.8 G/DL (ref 12–16)
IMM GRANULOCYTES # BLD: 0.01 10*3/MM3 (ref 0–0.03)
IMM GRANULOCYTES NFR BLD: 0.2 % (ref 0–5)
LYMPHOCYTES # BLD AUTO: 1.64 10*3/MM3 (ref 0.72–4.86)
LYMPHOCYTES NFR BLD AUTO: 34.8 % (ref 15–45)
MCH RBC QN AUTO: 29.9 PG (ref 28–32)
MCHC RBC AUTO-ENTMCNC: 33 G/DL (ref 33–36)
MCV RBC AUTO: 90.7 FL (ref 82–98)
MONOCYTES # BLD AUTO: 0.5 10*3/MM3 (ref 0.19–1.3)
MONOCYTES NFR BLD AUTO: 10.6 % (ref 4–12)
NEUTROPHILS # BLD AUTO: 2.29 10*3/MM3 (ref 1.87–8.4)
NEUTROPHILS NFR BLD AUTO: 48.6 % (ref 39–78)
NRBC BLD MANUAL-RTO: 0 /100 WBC (ref 0–0)
PLATELET # BLD AUTO: 239 10*3/MM3 (ref 130–400)
PMV BLD AUTO: 10.9 FL (ref 6–12)
POTASSIUM BLD-SCNC: 4.2 MMOL/L (ref 3.5–5.3)
PROT SERPL-MCNC: 6.6 G/DL (ref 6.3–8.7)
RBC # BLD AUTO: 4.28 10*6/MM3 (ref 4.2–5.4)
SODIUM BLD-SCNC: 138 MMOL/L (ref 135–145)
WBC NRBC COR # BLD: 4.71 10*3/MM3 (ref 4.8–10.8)

## 2018-09-14 PROCEDURE — 36415 COLL VENOUS BLD VENIPUNCTURE: CPT

## 2018-09-14 PROCEDURE — 80053 COMPREHEN METABOLIC PANEL: CPT | Performed by: INTERNAL MEDICINE

## 2018-09-14 PROCEDURE — 85025 COMPLETE CBC W/AUTO DIFF WBC: CPT | Performed by: INTERNAL MEDICINE

## 2018-09-14 PROCEDURE — 82378 CARCINOEMBRYONIC ANTIGEN: CPT | Performed by: INTERNAL MEDICINE

## 2018-09-14 PROCEDURE — 86300 IMMUNOASSAY TUMOR CA 15-3: CPT | Performed by: INTERNAL MEDICINE

## 2018-09-15 LAB — CANCER AG27-29 SERPL-ACNC: 19.3 U/ML (ref 0–38.6)

## 2018-09-24 ENCOUNTER — OFFICE VISIT (OUTPATIENT)
Dept: SURGERY | Age: 63
End: 2018-09-24

## 2018-09-24 VITALS — HEART RATE: 84 BPM | SYSTOLIC BLOOD PRESSURE: 124 MMHG | DIASTOLIC BLOOD PRESSURE: 80 MMHG

## 2018-09-24 DIAGNOSIS — Z98.890 S/P LUMPECTOMY, LEFT BREAST: ICD-10-CM

## 2018-09-24 DIAGNOSIS — Z90.13 STATUS POST BILATERAL MASTECTOMY: Primary | ICD-10-CM

## 2018-09-24 PROCEDURE — 99024 POSTOP FOLLOW-UP VISIT: CPT | Performed by: SURGERY

## 2018-09-24 NOTE — Clinical Note
Reschedule implant exchange for November 9 when Dr. Naresh Weber will be in [facility]

## 2018-09-25 NOTE — PROGRESS NOTES
Use Topics    Smoking status: Former Smoker     Packs/day: 0.50     Years: 20.00     Types: Cigarettes     Quit date: 3/22/1998    Smokeless tobacco: Never Used      Comment: Stopped 2000.  Alcohol use Yes      Comment: rarely       ROS:  14 point review of systems is negative except for the above. PHYSICAL EXAM:    The patient is a 59 y.o. female  in no acute distress. She is alert oriented and cooperative. HEENT: Normocephalic and atraumatic. EOMs intact. Pupils equal and round and reactive to light and accommodation. Sclere nonicteric. Oropharynx without masses or lesions. Neck: Neck is supple without masses or thyromegaly    Chest: Lungs are clear to auscultation    Cardiac: Regular rate and rhythm without rubs, murmurs, or gallops    Breasts: The  wounds look good with no evidence of infection, fluid accumulation, or skin necrosis. She has completed her fills and is ready for implant exchange. Abdomen: The abdomen is soft and nontender with no hepatosplenomegaly. Extremities: The extremities are normal. There are no signs of clubbing, cyanosis, or edema. IMPRESSION:    Doing well s/p  bilateral mastectomy and reconstruction    PLAN:  Reschedule implant exchange for November 9 when Dr. Yeison Juarez will be in [place]. I spent over 50% of this visit counseling patient. 15 minutes of face to face time with patient.

## 2018-11-01 ENCOUNTER — HOSPITAL ENCOUNTER (OUTPATIENT)
Dept: PREADMISSION TESTING | Age: 63
Discharge: HOME | End: 2018-11-05
Payer: COMMERCIAL

## 2018-11-01 VITALS — HEIGHT: 64 IN | WEIGHT: 140 LBS | BODY MASS INDEX: 23.9 KG/M2

## 2018-11-01 LAB
EKG P AXIS: 46 DEGREES
EKG P-R INTERVAL: 160 MS
EKG Q-T INTERVAL: 394 MS
EKG QRS DURATION: 74 MS
EKG QTC CALCULATION (BAZETT): 402 MS
EKG T AXIS: 38 DEGREES

## 2018-11-01 PROCEDURE — 93005 ELECTROCARDIOGRAM TRACING: CPT

## 2018-11-09 ENCOUNTER — ANESTHESIA (OUTPATIENT)
Dept: OPERATING ROOM | Age: 63
End: 2018-11-09
Payer: COMMERCIAL

## 2018-11-09 ENCOUNTER — ANESTHESIA EVENT (OUTPATIENT)
Dept: OPERATING ROOM | Age: 63
End: 2018-11-09
Payer: COMMERCIAL

## 2018-11-09 ENCOUNTER — HOSPITAL ENCOUNTER (OUTPATIENT)
Age: 63
Setting detail: OUTPATIENT SURGERY
Discharge: HOME | End: 2018-11-09
Attending: PLASTIC SURGERY | Admitting: PLASTIC SURGERY
Payer: COMMERCIAL

## 2018-11-09 VITALS
RESPIRATION RATE: 1 BRPM | OXYGEN SATURATION: 100 % | SYSTOLIC BLOOD PRESSURE: 117 MMHG | TEMPERATURE: 95.4 F | DIASTOLIC BLOOD PRESSURE: 61 MMHG

## 2018-11-09 VITALS
DIASTOLIC BLOOD PRESSURE: 84 MMHG | OXYGEN SATURATION: 98 % | HEART RATE: 77 BPM | TEMPERATURE: 98.4 F | RESPIRATION RATE: 20 BRPM | SYSTOLIC BLOOD PRESSURE: 137 MMHG | HEIGHT: 64 IN | BODY MASS INDEX: 23.9 KG/M2 | WEIGHT: 140 LBS

## 2018-11-09 PROCEDURE — 6360000002 HC RX W HCPCS: Performed by: PLASTIC SURGERY

## 2018-11-09 PROCEDURE — 6370000000 HC RX 637 (ALT 250 FOR IP): Performed by: ANESTHESIOLOGY

## 2018-11-09 PROCEDURE — 3600000015 HC SURGERY LEVEL 5 ADDTL 15MIN: Performed by: PLASTIC SURGERY

## 2018-11-09 PROCEDURE — 2580000003 HC RX 258: Performed by: PLASTIC SURGERY

## 2018-11-09 PROCEDURE — 88305 TISSUE EXAM BY PATHOLOGIST: CPT

## 2018-11-09 PROCEDURE — 2580000003 HC RX 258: Performed by: ANESTHESIOLOGY

## 2018-11-09 PROCEDURE — 3600000005 HC SURGERY LEVEL 5 BASE: Performed by: PLASTIC SURGERY

## 2018-11-09 PROCEDURE — 2500000003 HC RX 250 WO HCPCS: Performed by: PLASTIC SURGERY

## 2018-11-09 PROCEDURE — C1789 PROSTHESIS, BREAST, IMP: HCPCS | Performed by: PLASTIC SURGERY

## 2018-11-09 PROCEDURE — 6360000002 HC RX W HCPCS: Performed by: ANESTHESIOLOGY

## 2018-11-09 PROCEDURE — 2720000010 HC SURG SUPPLY STERILE: Performed by: PLASTIC SURGERY

## 2018-11-09 PROCEDURE — 6360000002 HC RX W HCPCS: Performed by: NURSE ANESTHETIST, CERTIFIED REGISTERED

## 2018-11-09 PROCEDURE — 2500000003 HC RX 250 WO HCPCS: Performed by: NURSE ANESTHETIST, CERTIFIED REGISTERED

## 2018-11-09 PROCEDURE — 7100000000 HC PACU RECOVERY - FIRST 15 MIN: Performed by: PLASTIC SURGERY

## 2018-11-09 PROCEDURE — 7100000011 HC PHASE II RECOVERY - ADDTL 15 MIN: Performed by: PLASTIC SURGERY

## 2018-11-09 PROCEDURE — C1713 ANCHOR/SCREW BN/BN,TIS/BN: HCPCS | Performed by: PLASTIC SURGERY

## 2018-11-09 PROCEDURE — 3700000000 HC ANESTHESIA ATTENDED CARE: Performed by: PLASTIC SURGERY

## 2018-11-09 PROCEDURE — 7100000010 HC PHASE II RECOVERY - FIRST 15 MIN: Performed by: PLASTIC SURGERY

## 2018-11-09 PROCEDURE — 2709999900 HC NON-CHARGEABLE SUPPLY: Performed by: PLASTIC SURGERY

## 2018-11-09 PROCEDURE — 7100000001 HC PACU RECOVERY - ADDTL 15 MIN: Performed by: PLASTIC SURGERY

## 2018-11-09 PROCEDURE — 64421 NJX AA&/STRD NTRCOST NRV EA: CPT | Performed by: SURGERY

## 2018-11-09 PROCEDURE — 3700000001 HC ADD 15 MINUTES (ANESTHESIA): Performed by: PLASTIC SURGERY

## 2018-11-09 DEVICE — IMPLANTABLE DEVICE: Type: IMPLANTABLE DEVICE | Status: FUNCTIONAL

## 2018-11-09 DEVICE — IMPLANTABLE DEVICE: Type: IMPLANTABLE DEVICE | Site: BREAST | Status: FUNCTIONAL

## 2018-11-09 RX ORDER — FENTANYL CITRATE 50 UG/ML
50 INJECTION, SOLUTION INTRAMUSCULAR; INTRAVENOUS
Status: DISCONTINUED | OUTPATIENT
Start: 2018-11-09 | End: 2018-11-09 | Stop reason: HOSPADM

## 2018-11-09 RX ORDER — MIDAZOLAM HYDROCHLORIDE 1 MG/ML
INJECTION INTRAMUSCULAR; INTRAVENOUS PRN
Status: DISCONTINUED | OUTPATIENT
Start: 2018-11-09 | End: 2018-11-09 | Stop reason: SDUPTHER

## 2018-11-09 RX ORDER — MEPERIDINE HYDROCHLORIDE 50 MG/ML
12.5 INJECTION INTRAMUSCULAR; INTRAVENOUS; SUBCUTANEOUS EVERY 5 MIN PRN
Status: DISCONTINUED | OUTPATIENT
Start: 2018-11-09 | End: 2018-11-09 | Stop reason: HOSPADM

## 2018-11-09 RX ORDER — SODIUM CHLORIDE 0.9 % (FLUSH) 0.9 %
10 SYRINGE (ML) INJECTION EVERY 12 HOURS SCHEDULED
Status: DISCONTINUED | OUTPATIENT
Start: 2018-11-09 | End: 2018-11-09 | Stop reason: HOSPADM

## 2018-11-09 RX ORDER — METOCLOPRAMIDE HYDROCHLORIDE 5 MG/ML
10 INJECTION INTRAMUSCULAR; INTRAVENOUS
Status: DISCONTINUED | OUTPATIENT
Start: 2018-11-09 | End: 2018-11-09 | Stop reason: HOSPADM

## 2018-11-09 RX ORDER — SODIUM CHLORIDE, SODIUM LACTATE, POTASSIUM CHLORIDE, CALCIUM CHLORIDE 600; 310; 30; 20 MG/100ML; MG/100ML; MG/100ML; MG/100ML
INJECTION, SOLUTION INTRAVENOUS CONTINUOUS
Status: DISCONTINUED | OUTPATIENT
Start: 2018-11-09 | End: 2018-11-09 | Stop reason: HOSPADM

## 2018-11-09 RX ORDER — PROMETHAZINE HYDROCHLORIDE 25 MG/ML
6.25 INJECTION, SOLUTION INTRAMUSCULAR; INTRAVENOUS
Status: DISCONTINUED | OUTPATIENT
Start: 2018-11-09 | End: 2018-11-09 | Stop reason: HOSPADM

## 2018-11-09 RX ORDER — MORPHINE SULFATE/0.9% NACL/PF 1 MG/ML
2 SYRINGE (ML) INJECTION EVERY 5 MIN PRN
Status: DISCONTINUED | OUTPATIENT
Start: 2018-11-09 | End: 2018-11-09 | Stop reason: HOSPADM

## 2018-11-09 RX ORDER — ONDANSETRON 2 MG/ML
INJECTION INTRAMUSCULAR; INTRAVENOUS PRN
Status: DISCONTINUED | OUTPATIENT
Start: 2018-11-09 | End: 2018-11-09 | Stop reason: SDUPTHER

## 2018-11-09 RX ORDER — ENALAPRILAT 2.5 MG/2ML
1.25 INJECTION INTRAVENOUS
Status: DISCONTINUED | OUTPATIENT
Start: 2018-11-09 | End: 2018-11-09 | Stop reason: HOSPADM

## 2018-11-09 RX ORDER — PROPOFOL 10 MG/ML
INJECTION, EMULSION INTRAVENOUS PRN
Status: DISCONTINUED | OUTPATIENT
Start: 2018-11-09 | End: 2018-11-09 | Stop reason: SDUPTHER

## 2018-11-09 RX ORDER — APREPITANT 40 MG/1
40 CAPSULE ORAL ONCE
Status: COMPLETED | OUTPATIENT
Start: 2018-11-09 | End: 2018-11-09

## 2018-11-09 RX ORDER — MIDAZOLAM HYDROCHLORIDE 1 MG/ML
2 INJECTION INTRAMUSCULAR; INTRAVENOUS
Status: COMPLETED | OUTPATIENT
Start: 2018-11-09 | End: 2018-11-09

## 2018-11-09 RX ORDER — LIDOCAINE HYDROCHLORIDE 10 MG/ML
INJECTION, SOLUTION EPIDURAL; INFILTRATION; INTRACAUDAL; PERINEURAL PRN
Status: DISCONTINUED | OUTPATIENT
Start: 2018-11-09 | End: 2018-11-09 | Stop reason: SDUPTHER

## 2018-11-09 RX ORDER — LABETALOL HYDROCHLORIDE 5 MG/ML
5 INJECTION, SOLUTION INTRAVENOUS EVERY 10 MIN PRN
Status: DISCONTINUED | OUTPATIENT
Start: 2018-11-09 | End: 2018-11-09 | Stop reason: HOSPADM

## 2018-11-09 RX ORDER — FENTANYL CITRATE 50 UG/ML
INJECTION, SOLUTION INTRAMUSCULAR; INTRAVENOUS PRN
Status: DISCONTINUED | OUTPATIENT
Start: 2018-11-09 | End: 2018-11-09 | Stop reason: SDUPTHER

## 2018-11-09 RX ORDER — MORPHINE SULFATE 4 MG/ML
4 INJECTION, SOLUTION INTRAMUSCULAR; INTRAVENOUS
Status: DISCONTINUED | OUTPATIENT
Start: 2018-11-09 | End: 2018-11-09 | Stop reason: HOSPADM

## 2018-11-09 RX ORDER — HYDRALAZINE HYDROCHLORIDE 20 MG/ML
5 INJECTION INTRAMUSCULAR; INTRAVENOUS EVERY 10 MIN PRN
Status: DISCONTINUED | OUTPATIENT
Start: 2018-11-09 | End: 2018-11-09 | Stop reason: HOSPADM

## 2018-11-09 RX ORDER — LIDOCAINE HYDROCHLORIDE 10 MG/ML
1 INJECTION, SOLUTION EPIDURAL; INFILTRATION; INTRACAUDAL; PERINEURAL
Status: DISCONTINUED | OUTPATIENT
Start: 2018-11-09 | End: 2018-11-09 | Stop reason: HOSPADM

## 2018-11-09 RX ORDER — MORPHINE SULFATE/0.9% NACL/PF 1 MG/ML
4 SYRINGE (ML) INJECTION EVERY 5 MIN PRN
Status: DISCONTINUED | OUTPATIENT
Start: 2018-11-09 | End: 2018-11-09 | Stop reason: HOSPADM

## 2018-11-09 RX ORDER — DEXAMETHASONE SODIUM PHOSPHATE 4 MG/ML
INJECTION, SOLUTION INTRA-ARTICULAR; INTRALESIONAL; INTRAMUSCULAR; INTRAVENOUS; SOFT TISSUE PRN
Status: DISCONTINUED | OUTPATIENT
Start: 2018-11-09 | End: 2018-11-09 | Stop reason: HOSPADM

## 2018-11-09 RX ORDER — SCOLOPAMINE TRANSDERMAL SYSTEM 1 MG/1
1 PATCH, EXTENDED RELEASE TRANSDERMAL ONCE
Status: DISCONTINUED | OUTPATIENT
Start: 2018-11-09 | End: 2018-11-09 | Stop reason: HOSPADM

## 2018-11-09 RX ORDER — SODIUM CHLORIDE 0.9 % (FLUSH) 0.9 %
10 SYRINGE (ML) INJECTION PRN
Status: DISCONTINUED | OUTPATIENT
Start: 2018-11-09 | End: 2018-11-09 | Stop reason: HOSPADM

## 2018-11-09 RX ORDER — DIPHENHYDRAMINE HYDROCHLORIDE 50 MG/ML
12.5 INJECTION INTRAMUSCULAR; INTRAVENOUS
Status: DISCONTINUED | OUTPATIENT
Start: 2018-11-09 | End: 2018-11-09 | Stop reason: HOSPADM

## 2018-11-09 RX ORDER — DEXAMETHASONE SODIUM PHOSPHATE 10 MG/ML
INJECTION INTRAMUSCULAR; INTRAVENOUS PRN
Status: DISCONTINUED | OUTPATIENT
Start: 2018-11-09 | End: 2018-11-09 | Stop reason: SDUPTHER

## 2018-11-09 RX ORDER — ROCURONIUM BROMIDE 10 MG/ML
INJECTION, SOLUTION INTRAVENOUS PRN
Status: DISCONTINUED | OUTPATIENT
Start: 2018-11-09 | End: 2018-11-09 | Stop reason: SDUPTHER

## 2018-11-09 RX ADMIN — PROPOFOL 200 MG: 10 INJECTION, EMULSION INTRAVENOUS at 08:32

## 2018-11-09 RX ADMIN — MIDAZOLAM 2 MG: 1 INJECTION INTRAMUSCULAR; INTRAVENOUS at 06:57

## 2018-11-09 RX ADMIN — APREPITANT 40 MG: 40 CAPSULE ORAL at 06:56

## 2018-11-09 RX ADMIN — DEXAMETHASONE SODIUM PHOSPHATE 10 MG: 10 INJECTION INTRAMUSCULAR; INTRAVENOUS at 08:41

## 2018-11-09 RX ADMIN — SODIUM CHLORIDE, SODIUM LACTATE, POTASSIUM CHLORIDE, AND CALCIUM CHLORIDE: 600; 310; 30; 20 INJECTION, SOLUTION INTRAVENOUS at 08:55

## 2018-11-09 RX ADMIN — ONDANSETRON HYDROCHLORIDE 4 MG: 2 INJECTION, SOLUTION INTRAMUSCULAR; INTRAVENOUS at 10:02

## 2018-11-09 RX ADMIN — SODIUM CHLORIDE, SODIUM LACTATE, POTASSIUM CHLORIDE, AND CALCIUM CHLORIDE: 600; 310; 30; 20 INJECTION, SOLUTION INTRAVENOUS at 06:57

## 2018-11-09 RX ADMIN — Medication 0.5 MG: at 10:47

## 2018-11-09 RX ADMIN — Medication 0.25 MG: at 10:37

## 2018-11-09 RX ADMIN — SUGAMMADEX 200 MG: 100 INJECTION, SOLUTION INTRAVENOUS at 10:03

## 2018-11-09 RX ADMIN — FENTANYL CITRATE 50 MCG: 50 INJECTION INTRAMUSCULAR; INTRAVENOUS at 09:12

## 2018-11-09 RX ADMIN — LIDOCAINE HYDROCHLORIDE 50 MG: 10 INJECTION, SOLUTION EPIDURAL; INFILTRATION; INTRACAUDAL; PERINEURAL at 08:32

## 2018-11-09 RX ADMIN — ROCURONIUM BROMIDE 30 MG: 10 INJECTION INTRAVENOUS at 08:32

## 2018-11-09 RX ADMIN — FENTANYL CITRATE 50 MCG: 50 INJECTION INTRAMUSCULAR; INTRAVENOUS at 09:36

## 2018-11-09 RX ADMIN — MIDAZOLAM 2 MG: 1 INJECTION INTRAMUSCULAR; INTRAVENOUS at 08:24

## 2018-11-09 RX ADMIN — WATER 1 G: 1 INJECTION INTRAMUSCULAR; INTRAVENOUS; SUBCUTANEOUS at 08:40

## 2018-11-09 RX ADMIN — ROCURONIUM BROMIDE 20 MG: 10 INJECTION INTRAVENOUS at 09:36

## 2018-11-09 RX ADMIN — Medication 0.25 MG: at 11:00

## 2018-11-09 ASSESSMENT — PAIN - FUNCTIONAL ASSESSMENT: PAIN_FUNCTIONAL_ASSESSMENT: 0-10

## 2018-11-09 ASSESSMENT — PAIN DESCRIPTION - FREQUENCY: FREQUENCY: INTERMITTENT

## 2018-11-09 ASSESSMENT — PAIN DESCRIPTION - ONSET: ONSET: AWAKENED FROM SLEEP

## 2018-11-09 ASSESSMENT — PAIN SCALES - GENERAL
PAINLEVEL_OUTOF10: 5
PAINLEVEL_OUTOF10: 3
PAINLEVEL_OUTOF10: 5
PAINLEVEL_OUTOF10: 4

## 2018-11-09 ASSESSMENT — PAIN DESCRIPTION - LOCATION: LOCATION: BREAST

## 2018-11-09 ASSESSMENT — PAIN DESCRIPTION - PAIN TYPE: TYPE: SURGICAL PAIN

## 2018-11-09 ASSESSMENT — PAIN DESCRIPTION - DESCRIPTORS: DESCRIPTORS: ACHING

## 2018-11-09 ASSESSMENT — LIFESTYLE VARIABLES: SMOKING_STATUS: 0

## 2018-11-09 NOTE — ANESTHESIA PRE PROCEDURE
Encounters:   11/09/18 140 lb (63.5 kg)   11/01/18 140 lb (63.5 kg)   08/02/18 140 lb (63.5 kg)     Body mass index is 24.03 kg/m². CBC:   Lab Results   Component Value Date    WBC 12.9 07/14/2018    RBC 3.74 07/14/2018    HGB 11.6 07/14/2018    HCT 36.0 07/14/2018    MCV 96.3 07/14/2018    RDW 12.3 07/14/2018     07/14/2018       CMP: No results found for: NA, K, CL, CO2, BUN, CREATININE, GFRAA, AGRATIO, LABGLOM, GLUCOSE, PROT, CALCIUM, BILITOT, ALKPHOS, AST, ALT    POC Tests: No results for input(s): POCGLU, POCNA, POCK, POCCL, POCBUN, POCHEMO, POCHCT in the last 72 hours. Coags: No results found for: PROTIME, INR, APTT    HCG (If Applicable): No results found for: PREGTESTUR, PREGSERUM, HCG, HCGQUANT     ABGs: No results found for: PHART, PO2ART, RJP7VHI, IYM1ABR, BEART, A3PZFFYK     Type & Screen (If Applicable):  No results found for: LABABO, [address]    Anesthesia Evaluation  Patient summary reviewed and Nursing notes reviewed   history of anesthetic complications: PONV. Airway: Mallampati: I  TM distance: >3 FB   Neck ROM: full  Mouth opening: > = 3 FB Dental:          Pulmonary:normal exam        (-) not a current smoker                           Cardiovascular:        (-) CAD       Beta Blocker:  Not on Beta Blocker         Neuro/Psych:      (-) seizures and CVA           GI/Hepatic/Renal:   (+) GERD: well controlled,           Endo/Other:    (+) malignancy/cancer. (-) diabetes mellitus, hypothyroidism               Abdominal:           Vascular: negative vascular ROS. Anesthesia Plan      general     ASA 2       Induction: intravenous. MIPS: Postoperative opioids intended and Prophylactic antiemetics administered. Anesthetic plan and risks discussed with patient and spouse. Plan discussed with CRNA.                   Radha Jeffers MD   11/9/2018

## 2018-11-14 NOTE — OP NOTE
fashion. Next, the left side tissue expander was removed. Again, an 8 cm incision was made over the healed mastectomy scar. Tissue expander was removed. Again, a capsulectomy was performed with  removal of excess scar tissue. Hemostasis obtained with electrocautery. A 520 mL Allergan SoftTouch breast implant was placed reference SSF-520  serial number [id number]. The patient had good size symmetry and closure  on the left side performed with 2-0 and 3-0 Vicryl in the deep layer and  4-0 Monocryl in a running subcuticular fashion. Instrument, sponge and  needle count correct at the conclusion of the procedure. Dressings  applied, and the patient was taken to recovery room in stable condition.         Kady Joel MD    D: 11/14/2018 10:55:26      T: 11/14/2018 12:07:32     [id number]  Job#: [id number]     Doc#: [id number]    CC:

## 2019-01-03 ENCOUNTER — TELEPHONE (OUTPATIENT)
Dept: GASTROENTEROLOGY | Facility: CLINIC | Age: 64
End: 2019-01-03

## 2019-01-03 NOTE — TELEPHONE ENCOUNTER
Patient called stating that she received recall letter to schedule repeat endoscopy. She has declined to schedule this.

## 2019-01-03 NOTE — TELEPHONE ENCOUNTER
Send a letter to her as follows:    Dear Ms Kerns,    I understand that you declined a follow up endoscopy.  Let me explain why this is a bad idea.    You had biopsies from the esophagus in the past that showed changes that occur with long-standing reflux disease.  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  Lorenzana's disease occurs in those who suffer with acid reflux off and on for many years.     The importance of Lorenzana's disease is that there is a small risk of those individuals developing cancer of the esophagus later in life.  The risk of developing esophageal cancer is increased at least 30-fold above that of the general population.  Estimates of the annual cancer incidence in patients with Lorenzana's esophagus have ranged from 0.1 to almost 3.0 percent.  Therefore, it is important follow this with endoscopy on a regular basis to look for a potential change towards cancer.     You are due to have an endoscopy examination again which is why we contacted you to schedule it.     I ask that you reconsider your position and contact my office to schedule your endoscopy.  My office number is   [phone].  Please follow the prompts for Dr. Park.    I look forward to hearing from you.    Sincerely         Sylvia Park M.D.

## 2019-01-04 ENCOUNTER — TRANSCRIBE ORDERS (OUTPATIENT)
Dept: ADMINISTRATIVE | Facility: HOSPITAL | Age: 64
End: 2019-01-04

## 2019-01-04 ENCOUNTER — APPOINTMENT (OUTPATIENT)
Dept: LAB | Facility: HOSPITAL | Age: 64
End: 2019-01-04
Attending: INTERNAL MEDICINE

## 2019-01-04 DIAGNOSIS — D05.90 CARCINOMA IN SITU OF BREAST, UNSPECIFIED LATERALITY, UNSPECIFIED TYPE: Primary | ICD-10-CM

## 2019-01-04 LAB
ALBUMIN SERPL-MCNC: 4.1 G/DL (ref 3.5–5)
ALBUMIN/GLOB SERPL: 1.6 G/DL (ref 1.1–2.5)
ALP SERPL-CCNC: 80 U/L (ref 24–120)
ALT SERPL W P-5'-P-CCNC: 22 U/L (ref 0–54)
ANION GAP SERPL CALCULATED.3IONS-SCNC: 9 MMOL/L (ref 4–13)
AST SERPL-CCNC: 20 U/L (ref 7–45)
BASOPHILS # BLD AUTO: 0.06 10*3/MM3 (ref 0–0.2)
BASOPHILS NFR BLD AUTO: 1.2 % (ref 0–2)
BILIRUB SERPL-MCNC: 0.5 MG/DL (ref 0.1–1)
BUN BLD-MCNC: 14 MG/DL (ref 5–21)
BUN/CREAT SERPL: 21.2 (ref 7–25)
CALCIUM SPEC-SCNC: 9.2 MG/DL (ref 8.4–10.4)
CEA SERPL-MCNC: 1.98 NG/ML (ref 0–5)
CHLORIDE SERPL-SCNC: 100 MMOL/L (ref 98–110)
CO2 SERPL-SCNC: 28 MMOL/L (ref 24–31)
CREAT BLD-MCNC: 0.66 MG/DL (ref 0.5–1.4)
DEPRECATED RDW RBC AUTO: 40.2 FL (ref 40–54)
EOSINOPHIL # BLD AUTO: 0.16 10*3/MM3 (ref 0–0.7)
EOSINOPHIL NFR BLD AUTO: 3.3 % (ref 0–4)
ERYTHROCYTE [DISTWIDTH] IN BLOOD BY AUTOMATED COUNT: 12.1 % (ref 12–15)
GFR SERPL CREATININE-BSD FRML MDRD: 91 ML/MIN/1.73
GLOBULIN UR ELPH-MCNC: 2.6 GM/DL
GLUCOSE BLD-MCNC: 104 MG/DL (ref 70–100)
HCT VFR BLD AUTO: 40.5 % (ref 37–47)
HGB BLD-MCNC: 13.3 G/DL (ref 12–16)
IMM GRANULOCYTES # BLD AUTO: 0.01 10*3/MM3 (ref 0–0.03)
IMM GRANULOCYTES NFR BLD AUTO: 0.2 % (ref 0–5)
LYMPHOCYTES # BLD AUTO: 1.27 10*3/MM3 (ref 0.72–4.86)
LYMPHOCYTES NFR BLD AUTO: 26.2 % (ref 15–45)
MCH RBC QN AUTO: 29.9 PG (ref 28–32)
MCHC RBC AUTO-ENTMCNC: 32.8 G/DL (ref 33–36)
MCV RBC AUTO: 91 FL (ref 82–98)
MONOCYTES # BLD AUTO: 0.51 10*3/MM3 (ref 0.19–1.3)
MONOCYTES NFR BLD AUTO: 10.5 % (ref 4–12)
NEUTROPHILS # BLD AUTO: 2.84 10*3/MM3 (ref 1.87–8.4)
NEUTROPHILS NFR BLD AUTO: 58.6 % (ref 39–78)
NRBC BLD AUTO-RTO: 0 /100 WBC (ref 0–0)
PLATELET # BLD AUTO: 213 10*3/MM3 (ref 130–400)
PMV BLD AUTO: 11 FL (ref 6–12)
POTASSIUM BLD-SCNC: 4.2 MMOL/L (ref 3.5–5.3)
PROT SERPL-MCNC: 6.7 G/DL (ref 6.3–8.7)
RBC # BLD AUTO: 4.45 10*6/MM3 (ref 4.2–5.4)
SODIUM BLD-SCNC: 137 MMOL/L (ref 135–145)
WBC NRBC COR # BLD: 4.85 10*3/MM3 (ref 4.8–10.8)

## 2019-01-04 PROCEDURE — 80053 COMPREHEN METABOLIC PANEL: CPT | Performed by: INTERNAL MEDICINE

## 2019-01-04 PROCEDURE — 86300 IMMUNOASSAY TUMOR CA 15-3: CPT | Performed by: INTERNAL MEDICINE

## 2019-01-04 PROCEDURE — 82378 CARCINOEMBRYONIC ANTIGEN: CPT | Performed by: INTERNAL MEDICINE

## 2019-01-04 PROCEDURE — 36415 COLL VENOUS BLD VENIPUNCTURE: CPT

## 2019-01-04 PROCEDURE — 85025 COMPLETE CBC W/AUTO DIFF WBC: CPT | Performed by: INTERNAL MEDICINE

## 2019-01-05 LAB — CANCER AG27-29 SERPL-ACNC: 17.8 U/ML (ref 0–38.6)

## 2019-02-14 ENCOUNTER — OFFICE VISIT (OUTPATIENT)
Dept: GASTROENTEROLOGY | Facility: CLINIC | Age: 64
End: 2019-02-14

## 2019-02-14 VITALS
BODY MASS INDEX: 24.07 KG/M2 | WEIGHT: 141 LBS | TEMPERATURE: 98 F | HEIGHT: 64 IN | OXYGEN SATURATION: 99 % | DIASTOLIC BLOOD PRESSURE: 74 MMHG | HEART RATE: 77 BPM | SYSTOLIC BLOOD PRESSURE: 118 MMHG

## 2019-02-14 DIAGNOSIS — Z87.19 HISTORY OF BARRETT'S ESOPHAGUS: Primary | ICD-10-CM

## 2019-02-14 DIAGNOSIS — Z87.19 HISTORY OF ESOPHAGITIS: ICD-10-CM

## 2019-02-14 PROCEDURE — 99212 OFFICE O/P EST SF 10 MIN: CPT | Performed by: NURSE PRACTITIONER

## 2019-02-14 RX ORDER — MULTIVIT WITH MINERALS/LUTEIN
1000 TABLET ORAL DAILY
COMMUNITY
End: 2019-12-20

## 2019-02-14 RX ORDER — MINOCYCLINE HYDROCHLORIDE 50 MG/1
50 CAPSULE ORAL DAILY
COMMUNITY
End: 2019-12-20

## 2019-02-14 NOTE — PROGRESS NOTES
Chief Complaint:   Chief Complaint   Patient presents with   • Endoscopy     Pt presents today for endo recall for Lorenzana's-last endo was 2/2016; pt states she hasn't been taking a PPI and is doing fine without it          Patient ID: Evelia Kerns is a 63 y.o. female     History of Present Illness: This is a very pleasant 63-year-old female who is here today for recall EGD per history of Lorenzana's esophagitis.    The pateints last EGD was performed 2/17/16 for hx of Barretts and was found to have LA grade C esophagitis.  The patient tells me that she has been doing well.  She states she does not take any PPI. The patient denies any nausea, vomiting, epigastric pain, dysphagia, pyrosis or hematemesis.  The patient denies any fever or chills.  Denies any melena or hematochezia.  Denies any unintentional weight loss or loss of appetite.    Past Medical History:   Diagnosis Date   • Lorenzana's esophagus    • Breast cancer (CMS/HCC) 2005    left   • Cancer (CMS/HCC)     breast   • Cervical dysplasia    • Drug therapy    • Hx of radiation therapy        Past Surgical History:   Procedure Laterality Date   • ADENOIDECTOMY     • BREAST AUGMENTATION Bilateral    • BREAST BIOPSY Left 2005   • BREAST LUMPECTOMY      2005   • COLONOSCOPY  02/17/2016    Colon polyps; Diverticulosis; Repeat 3-5 years    • COLONOSCOPY  02/26/2007    Dr. Matute-Repeat in 5 years    • ENDOSCOPY  02/17/2016    Hiatal hernia, LA Grade C and Lorenzana's   • ENDOSCOPY  10/03/2011    Hiatal hernia; Lorenzana's    • ENDOSCOPY  02/22/2010    Dr. Matute-hiatal hernia; Lorenzana's    • ENDOSCOPY  12/22/2009    Dr. Matute-Esophageal ulcers; gastritis; hiatal hernia-Repeat in 2 months    • KNEE ARTHROPLASTY     • MASTECTOMY Bilateral    • TONSILLECTOMY           Current Outpatient Medications:   •  minocycline (MINOCIN,DYNACIN) 50 MG capsule, Take 50 mg by mouth Daily., Disp: , Rfl:   •  UNKNOWN TO PATIENT, Cleanse vitality, Disp: , Rfl:   •  vitamin  "E 1000 UNIT capsule, Take 1,000 Units by mouth Daily., Disp: , Rfl:     No Known Allergies    Social History     Socioeconomic History   • Marital status: Single     Spouse name: Not on file   • Number of children: Not on file   • Years of education: Not on file   • Highest education level: Not on file   Social Needs   • Financial resource strain: Not on file   • Food insecurity - worry: Not on file   • Food insecurity - inability: Not on file   • Transportation needs - medical: Not on file   • Transportation needs - non-medical: Not on file   Occupational History   • Not on file   Tobacco Use   • Smoking status: Former Smoker   • Smokeless tobacco: Never Used   Substance and Sexual Activity   • Alcohol use: Yes     Comment: Rarely   • Drug use: No   • Sexual activity: Yes   Other Topics Concern   • Not on file   Social History Narrative   • Not on file       Family History   Problem Relation Age of Onset   • Stroke Father    • Leukemia Mother    • No Known Problems Brother    • No Known Problems Sister    • No Known Problems Sister    • Breast cancer Paternal Aunt 60   • No Known Problems Daughter    • No Known Problems Son    • No Known Problems Maternal Grandmother    • No Known Problems Paternal Grandmother    • No Known Problems Maternal Aunt    • Breast cancer Other 48   • Ovarian cancer Neg Hx    • Uterine cancer Neg Hx    • Colon cancer Neg Hx    • Melanoma Neg Hx    • Prostate cancer Neg Hx    • BRCA 1/2 Neg Hx    • Endometrial cancer Neg Hx        Vitals:    02/14/19 0847   BP: 118/74   BP Location: Right arm   Patient Position: Sitting   Cuff Size: Adult   Pulse: 77   Temp: 98 °F (36.7 °C)   TempSrc: Tympanic   SpO2: 99%   Weight: 64 kg (141 lb)   Height: 162.6 cm (64\")       Review of Systems:    General:    Present -feeling well   Skin:    Not Present-Rash   HEENT:     Not Present-Acute visual changes or Acute hearing changes   Neck :    Not Present- swollen glands   Genitourinary:      Not Present- " burning, frequency, urgency hematuria, dysuria,   Cardiovascular:   Not Present-chest pain, palpitations, or pressure   Respiratory:   Not Present- shortness of breath or cough   Gastrointestinal:  Musculoskeletal:  Neurological:  Psychiatric:   Present as mentioned in the HP    Not Present. Recent gait disturbances.    Not Present-Seizures and weakness in extremities.    Not Present- Anxiety or Depression.       Physical Exam:    General Appearance:    Alert, cooperative, in no acute distress   Psych:    Mood appropriate    Eyes:          conjunctivae and sclerae normal, no   icterus, no pallor   ENMT:    Ears appear intact with no abnormalities noted oral mucosa moist   Neck:   No adenopathy, supple, trachea midline, no thyromegaly, no   carotid bruit, no JVD    Cardiovascular:    Regular rhythm and normal rate, normal S1 and S2, no            murmur, no gallop, no rub, no click   Gastrointestinal:     Inspection normal.  Normal bowel sounds, no masses, no organomegaly, soft round non-tender, non-distended, no guarding, no rebound or tenderness. No hepatosplenomegaly.   Skin:   No bleeding, bruising or rash   Neurologic:   nonfocal       Lab Results   Component Value Date    WBC 4.85 01/04/2019    WBC 4.71 (L) 09/14/2018    WBC 5.41 06/18/2018    HGB 13.3 01/04/2019    HGB 12.8 09/14/2018    HGB 12.9 06/18/2018    HCT 40.5 01/04/2019    HCT 38.8 09/14/2018    HCT 38.4 06/18/2018     01/04/2019     09/14/2018     06/18/2018        Lab Results   Component Value Date     01/04/2019     09/14/2018     06/18/2018    K 4.2 01/04/2019    K 4.2 09/14/2018    K 4.2 06/18/2018     01/04/2019     09/14/2018     06/18/2018    CO2 28.0 01/04/2019    CO2 25.0 09/14/2018    CO2 24.0 06/18/2018    BUN 14 01/04/2019    BUN 16 09/14/2018    BUN 12 06/18/2018    CREATININE 0.66 01/04/2019    CREATININE 0.64 09/14/2018    CREATININE 0.66 06/18/2018    BILITOT 0.5 01/04/2019     BILITOT 0.4 09/14/2018    BILITOT 0.4 06/18/2018    ALKPHOS 80 01/04/2019    ALKPHOS 81 09/14/2018    ALKPHOS 70 06/18/2018    ALT 22 01/04/2019    ALT 31 09/14/2018    ALT 24 06/18/2018    AST 20 01/04/2019    AST 23 09/14/2018    AST 21 06/18/2018    GLUCOSE 104 (H) 01/04/2019    GLUCOSE 106 (H) 09/14/2018    GLUCOSE 109 (H) 06/18/2018       No results found for: INR    Body mass index is 24.2 kg/m². Patient's Body mass index is 24.2 kg/m². BMI is within normal parameters. No follow-up required..    Assessment and Plan:  Assessment/Plan   Evelia was seen today for endoscopy.    Diagnoses and all orders for this visit:    History of Lorenzana's esophagus  Comments:  EGD scheduled  Orders:  -     Case Request; Standing  -     Case Request    History of esophagitis  -     Case Request; Standing  -     Case Request    Other orders  -     Follow Anesthesia Guidelines / Standing Orders; Future  -     Implement Anesthesia Orders Day of Procedure; Standing  -     Obtain Informed Consent; Standing             There are no Patient Instructions on file for this visit.    Next follow-up appointment      The risks, benefits, and alternatives of endoscopy were reviewed with the patient today.  Risks including perforation, with or without dilation, possibly requiring surgery.  Additional risks include risk of bleeding.  There is also the risk of a drug reaction or problems with anesthesia.  This will be discussed with the further by the anesthesia team on the day of the procedure. The benefits include the diagnosis and management of disease of the upper digestive tract.  Alternatives to endoscopy include upper GI series, laboratory testing, radiographic evaluation, or no intervention.  The patient verbalizes understanding and agrees.      EMR Dragon/Transcription disclaimer:  Much of this encounter note is an electronic transcription/translation of spoken language to printed text. The electronic translation of spoken language may  permit erroneous, or at times, nonsensical words or phrases to be inadvertently transcribed; although I have reviewed the note for such errors, some may still exist.

## 2019-02-28 ENCOUNTER — ANESTHESIA EVENT (OUTPATIENT)
Dept: GASTROENTEROLOGY | Facility: HOSPITAL | Age: 64
End: 2019-02-28

## 2019-02-28 ENCOUNTER — HOSPITAL ENCOUNTER (OUTPATIENT)
Facility: HOSPITAL | Age: 64
Setting detail: HOSPITAL OUTPATIENT SURGERY
Discharge: HOME | End: 2019-02-28
Attending: INTERNAL MEDICINE | Admitting: INTERNAL MEDICINE

## 2019-02-28 ENCOUNTER — ANESTHESIA (OUTPATIENT)
Dept: GASTROENTEROLOGY | Facility: HOSPITAL | Age: 64
End: 2019-02-28

## 2019-02-28 VITALS
DIASTOLIC BLOOD PRESSURE: 54 MMHG | BODY MASS INDEX: 25.52 KG/M2 | HEART RATE: 77 BPM | TEMPERATURE: 98.3 F | WEIGHT: 144 LBS | HEIGHT: 63 IN | OXYGEN SATURATION: 97 % | RESPIRATION RATE: 16 BRPM | SYSTOLIC BLOOD PRESSURE: 99 MMHG

## 2019-02-28 DIAGNOSIS — Z87.19 HISTORY OF BARRETT'S ESOPHAGUS: ICD-10-CM

## 2019-02-28 DIAGNOSIS — Z87.19 HISTORY OF ESOPHAGITIS: ICD-10-CM

## 2019-02-28 PROCEDURE — 88305 TISSUE EXAM BY PATHOLOGIST: CPT | Performed by: INTERNAL MEDICINE

## 2019-02-28 PROCEDURE — 25010000002 PROPOFOL 10 MG/ML EMULSION: Performed by: NURSE ANESTHETIST, CERTIFIED REGISTERED

## 2019-02-28 PROCEDURE — 43239 EGD BIOPSY SINGLE/MULTIPLE: CPT | Performed by: INTERNAL MEDICINE

## 2019-02-28 RX ORDER — SODIUM CHLORIDE 9 MG/ML
500 INJECTION, SOLUTION INTRAVENOUS CONTINUOUS PRN
Status: DISCONTINUED | OUTPATIENT
Start: 2019-02-28 | End: 2019-02-28 | Stop reason: HOSPADM

## 2019-02-28 RX ORDER — PROPOFOL 10 MG/ML
VIAL (ML) INTRAVENOUS AS NEEDED
Status: DISCONTINUED | OUTPATIENT
Start: 2019-02-28 | End: 2019-02-28 | Stop reason: SURG

## 2019-02-28 RX ORDER — LIDOCAINE HYDROCHLORIDE 20 MG/ML
INJECTION, SOLUTION INFILTRATION; PERINEURAL AS NEEDED
Status: DISCONTINUED | OUTPATIENT
Start: 2019-02-28 | End: 2019-02-28 | Stop reason: SURG

## 2019-02-28 RX ORDER — SODIUM CHLORIDE 0.9 % (FLUSH) 0.9 %
3 SYRINGE (ML) INJECTION AS NEEDED
Status: DISCONTINUED | OUTPATIENT
Start: 2019-02-28 | End: 2019-02-28 | Stop reason: HOSPADM

## 2019-02-28 RX ORDER — PANTOPRAZOLE SODIUM 40 MG/1
40 TABLET, DELAYED RELEASE ORAL DAILY
Qty: 30 TABLET | Refills: 11 | Status: SHIPPED | OUTPATIENT
Start: 2019-02-28 | End: 2019-12-20

## 2019-02-28 RX ADMIN — PROPOFOL 280 MG: 10 INJECTION, EMULSION INTRAVENOUS at 09:54

## 2019-02-28 RX ADMIN — SODIUM CHLORIDE 500 ML: 9 INJECTION, SOLUTION INTRAVENOUS at 08:34

## 2019-02-28 RX ADMIN — LIDOCAINE HYDROCHLORIDE 80 MG: 20 INJECTION, SOLUTION INFILTRATION; PERINEURAL at 09:54

## 2019-02-28 NOTE — ANESTHESIA POSTPROCEDURE EVALUATION
Patient: Evelia Kerns    Procedure Summary     Date:  02/28/19 Room / Location:  [facility] ENDOSCOPY 4 / [facility] END[facility]    Anesthesia Start:  0946 Anesthesia Stop:  1005    Procedure:  ESOPHAGOGASTRODUODENOSCOPY WITH ANESTHESIA (N/A ) Diagnosis:       History of Lorenzana's esophagus      History of esophagitis      (History of Lorenzana's esophagus [Z87.19])      (History of esophagitis [Z87.19])    Surgeon:  Sylvia Park MD Provider:  Malika Medeiros CRNA    Anesthesia Type:  MAC ASA Status:  2          Anesthesia Type: MAC  Last vitals  BP   99/54 (02/28/19 1020)   Temp   98.3 °F (36.8 °C) (02/28/19 0822)   Pulse   77 (02/28/19 1020)   Resp   16 (02/28/19 1020)     SpO2   97 % (02/28/19 1020)     Post Anesthesia Care and Evaluation    Patient location during evaluation: PHASE II  Patient participation: complete - patient participated  Level of consciousness: awake and alert  Pain score: 0  Pain management: adequate  Airway patency: patent  Anesthetic complications: No anesthetic complications  PONV Status: none  Cardiovascular status: acceptable  Respiratory status: acceptable  Hydration status: acceptable  No anesthesia care post op

## 2019-02-28 NOTE — ANESTHESIA PREPROCEDURE EVALUATION
Anesthesia Evaluation     Patient summary reviewed   no history of anesthetic complications:  NPO Solid Status: > 8 hours  NPO Liquid Status: > 8 hours           Airway   Mallampati: I  TM distance: >3 FB  Neck ROM: full  No difficulty expected  Dental - normal exam     Pulmonary - negative pulmonary ROS   (-) asthma, sleep apnea  Cardiovascular - negative cardio ROS  Exercise tolerance: good (4-7 METS)    (-) hypertension, past MI, CAD, hyperlipidemia      Neuro/Psych- negative ROS  GI/Hepatic/Renal/Endo    (+)  GERD (barretts esophagus),    (-) liver disease, no renal disease, diabetes    Musculoskeletal     Abdominal    Substance History      OB/GYN          Other      history of cancer (breast) remission                    Anesthesia Plan    ASA 2     MAC     intravenous induction   Anesthetic plan, all risks, benefits, and alternatives have been provided, discussed and informed consent has been obtained with: patient.

## 2019-03-01 LAB
CYTO UR: NORMAL
LAB AP CASE REPORT: NORMAL
PATH REPORT.FINAL DX SPEC: NORMAL
PATH REPORT.GROSS SPEC: NORMAL

## 2019-03-08 ENCOUNTER — OFFICE VISIT (OUTPATIENT)
Dept: OBSTETRICS AND GYNECOLOGY | Facility: CLINIC | Age: 64
End: 2019-03-08

## 2019-03-08 VITALS
DIASTOLIC BLOOD PRESSURE: 74 MMHG | WEIGHT: 143 LBS | BODY MASS INDEX: 24.41 KG/M2 | SYSTOLIC BLOOD PRESSURE: 118 MMHG | HEIGHT: 64 IN

## 2019-03-08 DIAGNOSIS — N89.8 VAGINAL DRYNESS: ICD-10-CM

## 2019-03-08 DIAGNOSIS — Z17.0 MALIGNANT NEOPLASM OF AXILLARY TAIL OF BOTH BREASTS IN FEMALE, ESTROGEN RECEPTOR POSITIVE (HCC): ICD-10-CM

## 2019-03-08 DIAGNOSIS — Z01.419 WELL WOMAN EXAM WITH ROUTINE GYNECOLOGICAL EXAM: Primary | ICD-10-CM

## 2019-03-08 DIAGNOSIS — C50.611 MALIGNANT NEOPLASM OF AXILLARY TAIL OF BOTH BREASTS IN FEMALE, ESTROGEN RECEPTOR POSITIVE (HCC): ICD-10-CM

## 2019-03-08 DIAGNOSIS — Z87.19 HISTORY OF BARRETT'S ESOPHAGUS: ICD-10-CM

## 2019-03-08 DIAGNOSIS — M85.80 OSTEOPENIA, UNSPECIFIED LOCATION: ICD-10-CM

## 2019-03-08 DIAGNOSIS — C50.612 MALIGNANT NEOPLASM OF AXILLARY TAIL OF BOTH BREASTS IN FEMALE, ESTROGEN RECEPTOR POSITIVE (HCC): ICD-10-CM

## 2019-03-08 PROCEDURE — G0123 SCREEN CERV/VAG THIN LAYER: HCPCS | Performed by: NURSE PRACTITIONER

## 2019-03-08 PROCEDURE — 87624 HPV HI-RISK TYP POOLED RSLT: CPT | Performed by: NURSE PRACTITIONER

## 2019-03-08 PROCEDURE — 99396 PREV VISIT EST AGE 40-64: CPT | Performed by: NURSE PRACTITIONER

## 2019-03-08 NOTE — PROGRESS NOTES
"Subjective     Evelia Kerns is a 63 y.o. female    Here for yearly check up. Pt had bilateral mastectomies last summer for breast cancer reoccurance.      Gynecologic Exam   The patient's pertinent negatives include no pelvic pain, vaginal bleeding or vaginal discharge. The patient is experiencing no pain. Pertinent negatives include no abdominal pain, anorexia, back pain, chills, constipation, diarrhea, discolored urine, dysuria, fever, flank pain, frequency, headaches, hematuria, joint pain, joint swelling, nausea, painful intercourse, rash, sore throat, urgency or vomiting. She is sexually active. She is postmenopausal.         /74   Ht 162.6 cm (64\")   Wt 64.9 kg (143 lb)   LMP 01/19/2010 (Approximate)   Breastfeeding? No   BMI 24.55 kg/m²     Outpatient Encounter Medications as of 3/8/2019   Medication Sig Dispense Refill   • minocycline (MINOCIN,DYNACIN) 50 MG capsule Take 50 mg by mouth Daily.     • pantoprazole (PROTONIX) 40 MG EC tablet Take 1 tablet by mouth Daily. 30 tablet 11   • UNKNOWN TO PATIENT Cleanse vitality     • vitamin E 1000 UNIT capsule Take 1,000 Units by mouth Daily.       No facility-administered encounter medications on file as of 3/8/2019.        Surgical History  Past Surgical History:   Procedure Laterality Date   • ADENOIDECTOMY     • BREAST AUGMENTATION Bilateral    • BREAST BIOPSY Left 2005   • BREAST LUMPECTOMY      2005   • COLONOSCOPY  02/17/2016    Colon polyps; Diverticulosis; Repeat 3-5 years    • COLONOSCOPY  02/26/2007    Dr. Matute-Repeat in 5 years    • ENDOSCOPY  02/17/2016    Hiatal hernia, LA Grade C and Lorenzana's   • ENDOSCOPY  10/03/2011    Hiatal hernia; Lorenzana's    • ENDOSCOPY  02/22/2010    Dr. Matute-hiatal hernia; Lorenzana's    • ENDOSCOPY  12/22/2009    Dr. Matute-Esophageal ulcers; gastritis; hiatal hernia-Repeat in 2 months    • ENDOSCOPY N/A 2/28/2019    Procedure: ESOPHAGOGASTRODUODENOSCOPY WITH ANESTHESIA;  Surgeon: Sylvia Park, " MD;  Location: [facility] ENDOSCOPY;  Service: Gastroenterology   • KNEE ARTHROPLASTY     • MASTECTOMY Bilateral    • TONSILLECTOMY         Family History  Family History   Problem Relation Age of Onset   • Stroke Father    • Leukemia Mother    • No Known Problems Brother    • No Known Problems Sister    • No Known Problems Sister    • Breast cancer Paternal Aunt 60   • No Known Problems Daughter    • No Known Problems Son    • No Known Problems Maternal Grandmother    • No Known Problems Paternal Grandmother    • No Known Problems Maternal Aunt    • Breast cancer Other 48   • Ovarian cancer Neg Hx    • Uterine cancer Neg Hx    • Colon cancer Neg Hx    • Melanoma Neg Hx    • Prostate cancer Neg Hx    • BRCA 1/2 Neg Hx    • Endometrial cancer Neg Hx        The following portions of the patient's history were reviewed and updated as appropriate: allergies, current medications, past family history, past medical history, past social history, past surgical history and problem list.    Review of Systems   Constitutional: Negative for activity change, appetite change, chills, diaphoresis, fatigue, fever, unexpected weight gain and unexpected weight loss.   HENT: Negative for congestion, dental problem, drooling, ear discharge, ear pain, facial swelling, hearing loss, mouth sores, nosebleeds, postnasal drip, rhinorrhea, sinus pressure, sneezing, sore throat, tinnitus, trouble swallowing and voice change.    Eyes: Negative for blurred vision, double vision, photophobia, pain, discharge, redness, itching and visual disturbance.   Respiratory: Negative for apnea, cough, choking, chest tightness, shortness of breath, wheezing and stridor.    Cardiovascular: Negative for chest pain, palpitations and leg swelling.   Gastrointestinal: Negative for abdominal distention, abdominal pain, anal bleeding, anorexia, blood in stool, constipation, diarrhea, nausea, rectal pain, vomiting, GERD and indigestion.   Endocrine: Negative for cold  intolerance, heat intolerance, polydipsia, polyphagia and polyuria.   Genitourinary: Negative for breast discharge, urinary incontinence, decreased libido, decreased urine volume, difficulty urinating, dyspareunia, dysuria, flank pain, frequency, genital sores, hematuria, menstrual problem, pelvic pain, urgency, vaginal bleeding, vaginal discharge, vaginal pain, breast lump and breast pain.   Musculoskeletal: Negative for arthralgias, back pain, gait problem, joint pain, joint swelling, myalgias, neck pain, neck stiffness and bursitis.   Skin: Negative for color change, dry skin and rash.   Allergic/Immunologic: Negative for environmental allergies, food allergies and immunocompromised state.   Neurological: Negative for dizziness, tremors, seizures, syncope, facial asymmetry, speech difficulty, weakness, light-headedness, numbness, headache, memory problem and confusion.   Hematological: Negative for adenopathy. Does not bruise/bleed easily.   Psychiatric/Behavioral: Negative for agitation, behavioral problems, decreased concentration, dysphoric mood, hallucinations, self-injury, sleep disturbance, suicidal ideas, negative for hyperactivity, depressed mood and stress. The patient is not nervous/anxious.        Objective   Physical Exam   Constitutional: She is oriented to person, place, and time. She appears well-developed and well-nourished. No distress.   HENT:   Head: Normocephalic.   Right Ear: External ear normal.   Left Ear: External ear normal.   Nose: Nose normal.   Mouth/Throat: Oropharynx is clear and moist.   Eyes: Conjunctivae are normal. Right eye exhibits no discharge. Left eye exhibits no discharge. No scleral icterus.   Neck: Normal range of motion. Neck supple. Carotid bruit is not present. No tracheal deviation present. No thyromegaly present.   Cardiovascular: Normal rate, regular rhythm, normal heart sounds and intact distal pulses.   No murmur heard.  Pulmonary/Chest: Effort normal and breath  sounds normal. No respiratory distress. She has no wheezes. Right breast exhibits no inverted nipple, no mass, no nipple discharge, no skin change and no tenderness. Left breast exhibits no inverted nipple, no mass, no nipple discharge, no skin change and no tenderness. Breasts are symmetrical. There is no breast swelling.       Abdominal: Soft. She exhibits no distension and no mass. There is no tenderness. There is no guarding. No hernia. Hernia confirmed negative in the right inguinal area and confirmed negative in the left inguinal area.   Genitourinary: Rectum normal, vagina normal and uterus normal. Rectal exam shows no mass. No breast tenderness, discharge or bleeding. Pelvic exam was performed with patient supine. There is no rash, tenderness, lesion or injury on the right labia. There is no rash, tenderness, lesion or injury on the left labia. Uterus is not enlarged, not fixed and not tender. Cervix exhibits no motion tenderness, no discharge and no friability. Right adnexum displays no mass, no tenderness and no fullness. Left adnexum displays no mass, no tenderness and no fullness. No erythema, tenderness or bleeding in the vagina. No foreign body in the vagina. No signs of injury around the vagina. No vaginal discharge found.   Genitourinary Comments:   BSU normal  Urethral meatus  Normal  Perineum  Normal   Musculoskeletal: Normal range of motion. She exhibits no edema or tenderness.   Lymphadenopathy:        Head (right side): No submental, no submandibular, no tonsillar, no preauricular, no posterior auricular and no occipital adenopathy present.        Head (left side): No submental, no submandibular, no tonsillar, no preauricular, no posterior auricular and no occipital adenopathy present.     She has no cervical adenopathy.        Right cervical: No superficial cervical, no deep cervical and no posterior cervical adenopathy present.       Left cervical: No superficial cervical, no deep cervical and  no posterior cervical adenopathy present.     She has no axillary adenopathy.        Right: No inguinal adenopathy present.        Left: No inguinal adenopathy present.   Neurological: She is alert and oriented to person, place, and time. Coordination normal.   Skin: Skin is warm and dry. No bruising and no rash noted. She is not diaphoretic. No erythema.   Psychiatric: She has a normal mood and affect. Her behavior is normal. Judgment and thought content normal.   Nursing note and vitals reviewed.      Assessment/Plan   Evelia was seen today for gynecologic exam.    Diagnoses and all orders for this visit:    Well woman exam with routine gynecological exam  Normal GYN exam. Will RTO for lab work Encouraged SBE, pt is aware how to do self breast exam and the importance of same. Discussed weight management and importance of maintaining a healthy weight. Discussed Vitamin D intake and the importance of adequate vitamin D for both Bone Health and a healthy immune system.  Discussed Daily exercise and the importance of same, in regards to a healthy heart as well as helping to maintain her weight and improving her mental health.  BMI  24.5. Colonoscopy is up to date.  Mammogram is no longer required. Had BD last year.  Pap smear is done per ASCCP guidelines. Pt has a yearly pelvic U/S through the [facility] at the .  -     Liquid-based Pap Smear, Screening  -     CBC & Differential  -     Comprehensive Metabolic Panel  -     Lipid Panel With LDL / HDL Ratio  -     TSH  -     T3, Uptake  -     Vitamin D 25 Hydroxy  -     T4, Free  -     Hemoglobin A1c  -     UA / M With / Rflx Culture([facility] ONLY) - Urine, Clean Catch    History of Lorenzana's esophagus  Comments:  Pt just had Endo and no sign of barretts this time.     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 Had DICS in both breasts. Had reconstruction.      Osteopenia, unspecified location  Comments:  Pt is encouraged to take calcium and will have Vitamin D checked today.     Vaginal dryness  Comments:  Pt has been using Coconut Oil daily and it has helped her dryness.      Family History of Breast Cancer  Pt had positive My Risk for colon cancer.    Patient's Body mass index is 24.55 kg/m². BMI is within normal parameters. No follow-up required..      Nano Spence, APRN  3/8/2019

## 2019-03-12 LAB
GEN CATEG CVX/VAG CYTO-IMP: ABNORMAL
HPV I/H RISK 4 DNA CVX QL PROBE+SIG AMP: NOT DETECTED
LAB AP CASE REPORT: ABNORMAL
LAB AP GYN ADDITIONAL INFORMATION: ABNORMAL
LAB AP GYN OTHER FINDINGS: ABNORMAL
PATH INTERP SPEC-IMP: ABNORMAL
STAT OF ADQ CVX/VAG CYTO-IMP: ABNORMAL

## 2019-03-15 ENCOUNTER — TELEPHONE (OUTPATIENT)
Dept: OBSTETRICS AND GYNECOLOGY | Facility: CLINIC | Age: 64
End: 2019-03-15

## 2019-03-15 LAB
25(OH)D3+25(OH)D2 SERPL-MCNC: 27 NG/ML (ref 30–100)
ALBUMIN SERPL-MCNC: 4.2 G/DL (ref 3.5–5)
ALBUMIN/GLOB SERPL: 1.8 G/DL (ref 1.1–2.5)
ALP SERPL-CCNC: 85 U/L (ref 24–120)
ALT SERPL-CCNC: 22 U/L (ref 0–54)
APPEARANCE UR: CLEAR
AST SERPL-CCNC: 20 U/L (ref 7–45)
BACTERIA #/AREA URNS HPF: NORMAL /HPF
BACTERIA UR CULT: ABNORMAL
BASOPHILS # BLD AUTO: 0.06 10*3/MM3 (ref 0–0.2)
BASOPHILS NFR BLD AUTO: 1.3 % (ref 0–2)
BILIRUB SERPL-MCNC: 0.5 MG/DL (ref 0.1–1)
BILIRUB UR QL STRIP: NEGATIVE
BUN SERPL-MCNC: 14 MG/DL (ref 5–21)
BUN/CREAT SERPL: 23 (ref 7–25)
CALCIUM SERPL-MCNC: 9.3 MG/DL (ref 8.4–10.4)
CHLORIDE SERPL-SCNC: 103 MMOL/L (ref 98–110)
CHOLEST SERPL-MCNC: 237 MG/DL (ref 130–200)
CO2 SERPL-SCNC: 26 MMOL/L (ref 24–31)
COLOR UR: YELLOW
CREAT SERPL-MCNC: 0.61 MG/DL (ref 0.5–1.4)
EOSINOPHIL # BLD AUTO: 0.08 10*3/MM3 (ref 0–0.7)
EOSINOPHIL NFR BLD AUTO: 1.8 % (ref 0–4)
EPI CELLS #/AREA URNS HPF: NORMAL /HPF
ERYTHROCYTE [DISTWIDTH] IN BLOOD BY AUTOMATED COUNT: 12.3 % (ref 12–15)
GLOBULIN SER CALC-MCNC: 2.4 GM/DL
GLUCOSE SERPL-MCNC: 102 MG/DL (ref 70–100)
GLUCOSE UR QL: NEGATIVE
HBA1C MFR BLD: 5.7 % (ref 4.8–5.6)
HCT VFR BLD AUTO: 42.5 % (ref 37–47)
HDLC SERPL-MCNC: 60 MG/DL
HGB BLD-MCNC: 13.7 G/DL (ref 12–16)
HGB UR QL STRIP: NEGATIVE
IMM GRANULOCYTES # BLD AUTO: 0.01 10*3/MM3 (ref 0–0.05)
IMM GRANULOCYTES NFR BLD AUTO: 0.2 % (ref 0–5)
KETONES UR QL STRIP: NEGATIVE
LDLC SERPL CALC-MCNC: 162 MG/DL (ref 0–99)
LDLC/HDLC SERPL: 2.7 {RATIO}
LEUKOCYTE ESTERASE UR QL STRIP: ABNORMAL
LYMPHOCYTES # BLD AUTO: 1.28 10*3/MM3 (ref 0.72–4.86)
LYMPHOCYTES NFR BLD AUTO: 28.1 % (ref 15–45)
MCH RBC QN AUTO: 30.2 PG (ref 28–32)
MCHC RBC AUTO-ENTMCNC: 32.2 G/DL (ref 33–36)
MCV RBC AUTO: 93.6 FL (ref 82–98)
MICRO URNS: ABNORMAL
MONOCYTES # BLD AUTO: 0.52 10*3/MM3 (ref 0.19–1.3)
MONOCYTES NFR BLD AUTO: 11.4 % (ref 4–12)
NEUTROPHILS # BLD AUTO: 2.6 10*3/MM3 (ref 1.87–8.4)
NEUTROPHILS NFR BLD AUTO: 57.2 % (ref 39–78)
NITRITE UR QL STRIP: NEGATIVE
NRBC BLD AUTO-RTO: 0 /100 WBC (ref 0–0)
OTHER ANTIBIOTIC SUSC ISLT: ABNORMAL
PH UR STRIP: 6.5 [PH] (ref 5–7.5)
PLATELET # BLD AUTO: 212 10*3/MM3 (ref 130–400)
POTASSIUM SERPL-SCNC: 4.6 MMOL/L (ref 3.5–5.3)
PROT SERPL-MCNC: 6.6 G/DL (ref 6.3–8.7)
PROT UR QL STRIP: NEGATIVE
RBC # BLD AUTO: 4.54 10*6/MM3 (ref 4.2–5.4)
RBC #/AREA URNS HPF: NORMAL /HPF
SODIUM SERPL-SCNC: 138 MMOL/L (ref 135–145)
SP GR UR: 1 (ref 1–1.03)
T3RU NFR SERPL: 23 % (ref 24–39)
T4 FREE SERPL-MCNC: 0.96 NG/DL (ref 0.78–2.19)
TRIGL SERPL-MCNC: 75 MG/DL (ref 0–149)
TSH SERPL DL<=0.005 MIU/L-ACNC: 2.07 MIU/ML (ref 0.47–4.68)
URINALYSIS REFLEX: ABNORMAL
UROBILINOGEN UR STRIP-MCNC: 0.2 MG/DL (ref 0.2–1)
VLDLC SERPL CALC-MCNC: 15 MG/DL
WBC # BLD AUTO: 4.55 10*3/MM3 (ref 4.8–10.8)
WBC #/AREA URNS HPF: NORMAL /HPF

## 2019-03-15 RX ORDER — NITROFURANTOIN 25; 75 MG/1; MG/1
100 CAPSULE ORAL EVERY 12 HOURS SCHEDULED
Qty: 14 CAPSULE | Refills: 0 | Status: SHIPPED | OUTPATIENT
Start: 2019-03-15 | End: 2019-12-20

## 2019-04-23 NOTE — PROGRESS NOTES
HISTORY OF PRESENT ILLNESS:  Ms. Eliana Whitney  is a 59 y.o.  female   who is status post bilateral skin sparing mastectomy and reconstruction with tissue expanders on 7/13/2018. She had a previous lumpectomy and radiation on the left breast in 2005. More recently she had a biopsy that showed atypical ductal hyperplasia in the previously radiated breast. Excisional biopsy of this area demonstrated scattered foci of DCIS. After considerable discussion and soul searching, she decided that she wanted to proceed with bilateral skin sparing mastectomies and reconstruction. PATHOLOGY REVEALS:  FINAL DIAGNOSIS:       A. Right breast, skin sparing mastectomy:       No evidence of malignancy.      Mild benign fibrocystic changes.       B. 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       Greatest dimension of the residual ductal carcinoma in situ is 0.8       cm.  See microscopic.       Closest surgical margin is posterior margin (deep margin) at 1.1 cm.       No evidence of invasive carcinoma.         Pathologic AJCC classification: ypTis. She underwent implant exchange in November 2018    EXAM: The bilateral mastectomy and reconstruction incisions are looking good. There are no dominant masses, no skin or nipple changes , and no axillary adenopathy. There is nothing suspicious for new or recurrent tumor. IMPRESSION:    Doing well s/p  bilateral mastectomy and reconstruction    PLAN: Follow-up in 6 months for physical exam.    I spent over 50% of this visit counseling patient. 15 minutes of face to face time with patient.

## 2019-04-24 ENCOUNTER — OFFICE VISIT (OUTPATIENT)
Dept: SURGERY | Age: 64
End: 2019-04-24
Payer: COMMERCIAL

## 2019-04-24 VITALS — SYSTOLIC BLOOD PRESSURE: 120 MMHG | HEART RATE: 72 BPM | DIASTOLIC BLOOD PRESSURE: 84 MMHG

## 2019-04-24 DIAGNOSIS — Z90.13 STATUS POST BILATERAL MASTECTOMY: Primary | ICD-10-CM

## 2019-04-24 DIAGNOSIS — D05.12 DUCTAL CARCINOMA IN SITU (DCIS) OF LEFT BREAST: ICD-10-CM

## 2019-04-24 DIAGNOSIS — Z98.890 S/P LUMPECTOMY, LEFT BREAST: ICD-10-CM

## 2019-04-24 PROCEDURE — 99213 OFFICE O/P EST LOW 20 MIN: CPT | Performed by: SURGERY

## 2019-04-24 RX ORDER — CHLORAL HYDRATE 500 MG
3000 CAPSULE ORAL 3 TIMES DAILY
COMMUNITY

## 2019-04-24 RX ORDER — PANTOPRAZOLE SODIUM 40 MG/1
40 TABLET, DELAYED RELEASE ORAL
COMMUNITY
Start: 2019-02-28

## 2019-06-24 ENCOUNTER — APPOINTMENT (OUTPATIENT)
Dept: LAB | Facility: HOSPITAL | Age: 64
End: 2019-06-24

## 2019-06-24 ENCOUNTER — TRANSCRIBE ORDERS (OUTPATIENT)
Dept: ADMINISTRATIVE | Facility: HOSPITAL | Age: 64
End: 2019-06-24

## 2019-06-24 DIAGNOSIS — D05.92 CARCINOMA IN SITU OF LEFT BREAST, UNSPECIFIED TYPE: ICD-10-CM

## 2019-06-24 DIAGNOSIS — Z85.3 PERSONAL HISTORY OF BREAST CANCER: Primary | ICD-10-CM

## 2019-06-24 LAB
ALBUMIN SERPL-MCNC: 3.8 G/DL (ref 3.5–5)
ALBUMIN/GLOB SERPL: 1.5 G/DL (ref 1.1–2.5)
ALP SERPL-CCNC: 69 U/L (ref 24–120)
ALT SERPL W P-5'-P-CCNC: 21 U/L (ref 0–54)
ANION GAP SERPL CALCULATED.3IONS-SCNC: 7 MMOL/L (ref 4–13)
AST SERPL-CCNC: 23 U/L (ref 7–45)
BASOPHILS # BLD AUTO: 0.07 10*3/MM3 (ref 0–0.2)
BASOPHILS NFR BLD AUTO: 1.4 % (ref 0–2)
BILIRUB SERPL-MCNC: 0.5 MG/DL (ref 0.1–1)
BUN BLD-MCNC: 18 MG/DL (ref 5–21)
BUN/CREAT SERPL: 26.9 (ref 7–25)
CALCIUM SPEC-SCNC: 8.8 MG/DL (ref 8.4–10.4)
CEA SERPL-MCNC: 2.01 NG/ML (ref 0–5)
CHLORIDE SERPL-SCNC: 104 MMOL/L (ref 98–110)
CO2 SERPL-SCNC: 28 MMOL/L (ref 24–31)
CREAT BLD-MCNC: 0.67 MG/DL (ref 0.5–1.4)
DEPRECATED RDW RBC AUTO: 40.5 FL (ref 40–54)
EOSINOPHIL # BLD AUTO: 0.17 10*3/MM3 (ref 0–0.7)
EOSINOPHIL NFR BLD AUTO: 3.5 % (ref 0–4)
ERYTHROCYTE [DISTWIDTH] IN BLOOD BY AUTOMATED COUNT: 12.3 % (ref 12–15)
GFR SERPL CREATININE-BSD FRML MDRD: 89 ML/MIN/1.73
GLOBULIN UR ELPH-MCNC: 2.6 GM/DL
GLUCOSE BLD-MCNC: 109 MG/DL (ref 70–100)
HCT VFR BLD AUTO: 37.7 % (ref 37–47)
HGB BLD-MCNC: 12.7 G/DL (ref 12–16)
IMM GRANULOCYTES # BLD AUTO: 0.02 10*3/MM3 (ref 0–0.05)
IMM GRANULOCYTES NFR BLD AUTO: 0.4 % (ref 0–5)
LYMPHOCYTES # BLD AUTO: 1.63 10*3/MM3 (ref 0.72–4.86)
LYMPHOCYTES NFR BLD AUTO: 33.4 % (ref 15–45)
MCH RBC QN AUTO: 30.3 PG (ref 28–32)
MCHC RBC AUTO-ENTMCNC: 33.7 G/DL (ref 33–36)
MCV RBC AUTO: 90 FL (ref 82–98)
MONOCYTES # BLD AUTO: 0.48 10*3/MM3 (ref 0.19–1.3)
MONOCYTES NFR BLD AUTO: 9.8 % (ref 4–12)
NEUTROPHILS # BLD AUTO: 2.51 10*3/MM3 (ref 1.87–8.4)
NEUTROPHILS NFR BLD AUTO: 51.5 % (ref 39–78)
NRBC BLD AUTO-RTO: 0 /100 WBC (ref 0–0.2)
PLATELET # BLD AUTO: 205 10*3/MM3 (ref 130–400)
PMV BLD AUTO: 11.5 FL (ref 6–12)
POTASSIUM BLD-SCNC: 4 MMOL/L (ref 3.5–5.3)
PROT SERPL-MCNC: 6.4 G/DL (ref 6.3–8.7)
RBC # BLD AUTO: 4.19 10*6/MM3 (ref 4.2–5.4)
SODIUM BLD-SCNC: 139 MMOL/L (ref 135–145)
WBC NRBC COR # BLD: 4.88 10*3/MM3 (ref 4.8–10.8)

## 2019-06-24 PROCEDURE — 85025 COMPLETE CBC W/AUTO DIFF WBC: CPT | Performed by: INTERNAL MEDICINE

## 2019-06-24 PROCEDURE — 36415 COLL VENOUS BLD VENIPUNCTURE: CPT | Performed by: INTERNAL MEDICINE

## 2019-06-24 PROCEDURE — 86300 IMMUNOASSAY TUMOR CA 15-3: CPT | Performed by: INTERNAL MEDICINE

## 2019-06-24 PROCEDURE — 80053 COMPREHEN METABOLIC PANEL: CPT | Performed by: INTERNAL MEDICINE

## 2019-06-24 PROCEDURE — 82378 CARCINOEMBRYONIC ANTIGEN: CPT | Performed by: INTERNAL MEDICINE

## 2019-06-25 LAB — CANCER AG27-29 SERPL-ACNC: 14.6 U/ML (ref 0–38.6)

## 2019-10-23 ENCOUNTER — OFFICE VISIT (OUTPATIENT)
Dept: SURGERY | Age: 64
End: 2019-10-23
Payer: COMMERCIAL

## 2019-10-23 VITALS — DIASTOLIC BLOOD PRESSURE: 80 MMHG | SYSTOLIC BLOOD PRESSURE: 128 MMHG | HEART RATE: 80 BPM

## 2019-10-23 DIAGNOSIS — Z90.13 STATUS POST BILATERAL MASTECTOMY: Primary | ICD-10-CM

## 2019-10-23 DIAGNOSIS — D05.12 DUCTAL CARCINOMA IN SITU (DCIS) OF LEFT BREAST: ICD-10-CM

## 2019-10-23 PROCEDURE — 99213 OFFICE O/P EST LOW 20 MIN: CPT | Performed by: SURGERY

## 2019-12-13 ENCOUNTER — TRANSCRIBE ORDERS (OUTPATIENT)
Dept: ADMINISTRATIVE | Facility: HOSPITAL | Age: 64
End: 2019-12-13

## 2019-12-13 ENCOUNTER — LAB (OUTPATIENT)
Dept: LAB | Facility: HOSPITAL | Age: 64
End: 2019-12-13

## 2019-12-13 DIAGNOSIS — D05.12 BREAST NEOPLASM, TIS (DCIS), LEFT: ICD-10-CM

## 2019-12-13 DIAGNOSIS — D05.12 BREAST NEOPLASM, TIS (DCIS), LEFT: Primary | ICD-10-CM

## 2019-12-13 LAB
ALBUMIN SERPL-MCNC: 4.4 G/DL (ref 3.5–5.2)
ALBUMIN/GLOB SERPL: 1.6 G/DL
ALP SERPL-CCNC: 87 U/L (ref 39–117)
ALT SERPL W P-5'-P-CCNC: 15 U/L (ref 1–33)
ANION GAP SERPL CALCULATED.3IONS-SCNC: 11.3 MMOL/L (ref 5–15)
AST SERPL-CCNC: 18 U/L (ref 1–32)
BASOPHILS # BLD AUTO: 0.1 10*3/MM3 (ref 0–0.2)
BASOPHILS NFR BLD AUTO: 2 % (ref 0–1.5)
BILIRUB SERPL-MCNC: 0.4 MG/DL (ref 0.2–1.2)
BUN BLD-MCNC: 14 MG/DL (ref 8–23)
BUN/CREAT SERPL: 20 (ref 7–25)
CALCIUM SPEC-SCNC: 9.3 MG/DL (ref 8.6–10.5)
CEA SERPL-MCNC: 3.27 NG/ML
CHLORIDE SERPL-SCNC: 103 MMOL/L (ref 98–107)
CO2 SERPL-SCNC: 27.7 MMOL/L (ref 22–29)
CREAT BLD-MCNC: 0.7 MG/DL (ref 0.57–1)
DEPRECATED RDW RBC AUTO: 39.9 FL (ref 37–54)
EOSINOPHIL # BLD AUTO: 0.11 10*3/MM3 (ref 0–0.4)
EOSINOPHIL NFR BLD AUTO: 2.2 % (ref 0.3–6.2)
ERYTHROCYTE [DISTWIDTH] IN BLOOD BY AUTOMATED COUNT: 12.2 % (ref 12.3–15.4)
GFR SERPL CREATININE-BSD FRML MDRD: 85 ML/MIN/1.73
GLOBULIN UR ELPH-MCNC: 2.7 GM/DL
GLUCOSE BLD-MCNC: 95 MG/DL (ref 65–99)
HCT VFR BLD AUTO: 40.1 % (ref 34–46.6)
HGB BLD-MCNC: 13.4 G/DL (ref 12–15.9)
IMM GRANULOCYTES # BLD AUTO: 0.01 10*3/MM3 (ref 0–0.05)
IMM GRANULOCYTES NFR BLD AUTO: 0.2 % (ref 0–0.5)
LYMPHOCYTES # BLD AUTO: 1.43 10*3/MM3 (ref 0.7–3.1)
LYMPHOCYTES NFR BLD AUTO: 29.1 % (ref 19.6–45.3)
MCH RBC QN AUTO: 30 PG (ref 26.6–33)
MCHC RBC AUTO-ENTMCNC: 33.4 G/DL (ref 31.5–35.7)
MCV RBC AUTO: 89.7 FL (ref 79–97)
MONOCYTES # BLD AUTO: 0.45 10*3/MM3 (ref 0.1–0.9)
MONOCYTES NFR BLD AUTO: 9.1 % (ref 5–12)
NEUTROPHILS # BLD AUTO: 2.82 10*3/MM3 (ref 1.7–7)
NEUTROPHILS NFR BLD AUTO: 57.4 % (ref 42.7–76)
NRBC BLD AUTO-RTO: 0 /100 WBC (ref 0–0.2)
PLATELET # BLD AUTO: 229 10*3/MM3 (ref 140–450)
PMV BLD AUTO: 11.8 FL (ref 6–12)
POTASSIUM BLD-SCNC: 4.3 MMOL/L (ref 3.5–5.2)
PROT SERPL-MCNC: 7.1 G/DL (ref 6–8.5)
RBC # BLD AUTO: 4.47 10*6/MM3 (ref 3.77–5.28)
SODIUM BLD-SCNC: 142 MMOL/L (ref 136–145)
WBC NRBC COR # BLD: 4.92 10*3/MM3 (ref 3.4–10.8)

## 2019-12-13 PROCEDURE — 80053 COMPREHEN METABOLIC PANEL: CPT | Performed by: INTERNAL MEDICINE

## 2019-12-13 PROCEDURE — 86300 IMMUNOASSAY TUMOR CA 15-3: CPT | Performed by: INTERNAL MEDICINE

## 2019-12-13 PROCEDURE — 85025 COMPLETE CBC W/AUTO DIFF WBC: CPT | Performed by: INTERNAL MEDICINE

## 2019-12-13 PROCEDURE — 36415 COLL VENOUS BLD VENIPUNCTURE: CPT

## 2019-12-13 PROCEDURE — 82378 CARCINOEMBRYONIC ANTIGEN: CPT | Performed by: INTERNAL MEDICINE

## 2019-12-14 LAB — CANCER AG27-29 SERPL-ACNC: 15.7 U/ML (ref 0–38.6)

## 2019-12-15 PROBLEM — D05.12 DUCTAL CARCINOMA IN SITU (DCIS) OF LEFT BREAST: Status: ACTIVE | Noted: 2019-12-15

## 2019-12-15 PROBLEM — Z85.3 HISTORY OF BREAST CANCER: Status: ACTIVE | Noted: 2019-12-15

## 2019-12-15 NOTE — PROGRESS NOTES
[facility] ONC [facility] GROUP HEMATOLOGY AND ONCOLOGY  [address]  [place] [address] [phone]    Patient Name: Evelia Kerns  Encounter Date: 12/20/2019  YOB: 1955  Patient Number: [id number]      REASON FOR VISIT: Ms. Evelia Kerns is a 64-year-old female who returns in follow-up of carcinoma of the left breast. She is seen 172 months following the completion of adjuvant ACT (Adriamycin/ Cytoxan/Taxotere) chemotherapy. She completed 5 years of adjuvant Tamoxifen on 08/11/2010. 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 Alejandro. She received adjuvant Tamoxifen from 05/25/2018 through 07/13/2018 at which point she underwent bilateral nipple sparing mastectomies. She has since undergone placement of permanent breast implants bilaterally on 11/09/2018. The patient is here alone (usually with her spouse, Juvenal). History is obtained from the patient who is considered to be a reliable historian.     DIAGNOSTIC ABNORMALITIES: Ductal carcinoma-in-situ (DCIS)   1. Bilateral mammogram, 01/31/2018, [facility]. Comparison: 01/30/2017, 01/27/2016, 03/23/2015. Impression: Left breast calcifications for which additional imaging is recommended including CC and lateral magnification and 3D full lateral views. BI-RADS Category 0. No mammographic evidence of right breast malignancy.   2. Unilateral left diagnostic mammogram with CAD, 02/05/2018, [facility]. Comparison to screening mammogram of 01/31/2018. Impression: Indeterminate left breast calcifications for which stereotactic guided biopsy is recommended. BI-RADS Category 4.   3. Left breast core biopsies at 1 o’clock position, 03/05/2018 showing focal atypical ductal hyperplasia (0.1 cm).  4. Diagnosis: Breast, left needle localized excisional biopsy  "(KIS- 18,- 1450, 13 slides), 04/05/2018: Intermediate grade ductal carcinoma in situ, solid type; scar and radiation change; previous biopsy.  5. ER reported, 04/05/2018: On same specimen 2 blocks - #1450-5: ER (+) 29%; #1450-3: ER (-) 0%. The latter discussed and clarified with Dr. Vinny Lomeli and Dr. Parisa Nagel of pathology who confirmed that these are from the same specimen but heterogeneously expressed ER.    PREVIOUS INTERVENTIONS: Ductal carcinoma-in-situ (DCIS)   1. Left breast, excisional biopsy with needle localization, 03/30/2018 (Dr. Alejandro). FINAL DIAGNOSIS: 1. A few scattered foci of intermediate grade ductal carcinoma in situ. 2. No invasive malignancy identified. 3. Changes of previous biopsy site including fat necrosis and cicatrix formation. 4. No ductal carcinoma in situ identified at the inked margins of surgical excision. AJCC stage: pTis (DCIS) pNX - ER reported, 04/05/2018: On same specimen 2 blocks - #1450-5: ER (+) 29%; #1450-3: ER (-) 0%  2. Office encounter with Dr. Alejandro on 04/30/2018: \"After a prolonged discussion lasting 60 minutes, we felt it was most appropriate that she undergo close observation and no surgical intervention at present. Her DCIS has not been sent for ER yet. We will make sure this is sent. We will also send for Prelude DX. She will call back in 2 weeks to discuss her ER/TX positivity or not. I will see her back in 5 months with a unilateral left mammogram.\"   3. Adjuvant Tamoxifen beginning 05/25/2018 through 07/13/2018.  4. Bilateral nipple sparing mastectomies, Dr. Alejandro. Followed by reconstruction by Dr. Mark, 07/13/2018. Final Diagnoses: A) Right Breast, Skin-Sparing Mastectomy: No evidence of malignancy. Mild benign fibrocystic changes. B) Left Breast, Skin-Sparing Mastectomy: Microscopic focus of residual ductal carcinoma in situ, cribriform pattern, low nuclear grade. Greatest dimension of the residual ductal carcinoma in situ is 0.8 cm. Closest " surgical margins is the posterior margin (deep margin) at 1.1 cm. No evidence of invasive carcinoma. Pathologic AJCC Classification: ypTis.     DIAGNOSTIC ABNORMALITIES: Infiltrating ductal carcinoma left breast   1. Mammography, 03/15/2005. A new area of developing nodularity with calcifications deep in the upper outer quadrant of the left breast. Biopsy was recommended.  2. Biopsy of left breast, 04/06/2006. Infiltrating ductal carcinoma, high-grade, strongly ER (82% positive), weakly AL (9% positive), and HER-2/analisa negative.    PREVIOUS INTERVENTIONS: Infiltrating ductal carcinoma left breast   1. Left breast lumpectomy with sentinel lymph node biopsies, 04/12/2005. A 2.2 cm tumor mass of infiltrating high-grade ductal carcinoma with a component of ductal carcinoma in situ, high-grade. 2 of 4 sentinel nodes positive.  2. Formal left axillary node dissection, 04/26/2005. Six additional nodes retrieved. All 6 negative for evidence of metastatic carcinoma.  3. Adjuvant Adriamycin, Cytoxan, and Taxotere. From 05/26/2005 through 08/04/2005. Six cycles completed.   4. Tamoxifen. From 08/11/2005 through 08/11/2010.  5. Radiation treatments to left breast, 09/12/2005 to 10/26/2005, 6040 cGy.        Problem List Items Addressed This Visit        Other    History of breast cancer - Primary    Ductal carcinoma in situ (DCIS) of left breast           Ductal carcinoma in situ (DCIS) of left breast    12/15/2019 Initial Diagnosis     Ductal carcinoma in situ (DCIS) of left breast      12/15/2019 Cancer Staged     Staging form: Breast, AJCC 8th Edition  - Clinical stage from 12/15/2019: Stage 0 (cTis (DCIS), cN0, cM0, ER+, AL+, HER2-) - Signed by Tommy Richmond MD on 12/15/2019         PAST MEDICAL HISTORY:  ALLERGIES:  Allergies   Allergen Reactions   • Adhesive Tape Rash     CURRENT MEDICATIONS:  Outpatient Encounter Medications as of 12/20/2019   Medication Sig Dispense Refill   • Calcium Carb-Cholecalciferol (CALCIUM  500/D) 500-400 MG-UNIT chewable tablet Chew.     • Omega-3 Fatty Acids (OMEGA-3 FISH OIL) 1000 MG capsule Take  by mouth.     • Psyllium (METAMUCIL) wafer wafer Take  by mouth Daily.     • minocycline (MINOCIN,DYNACIN) 50 MG capsule Take 50 mg by mouth Daily.     • nitrofurantoin, macrocrystal-monohydrate, (MACROBID) 100 MG capsule Take 1 capsule by mouth Every 12 (Twelve) Hours. 14 capsule 0   • pantoprazole (PROTONIX) 40 MG EC tablet Take 1 tablet by mouth Daily. 30 tablet 11   • UNKNOWN TO PATIENT Cleanse vitality     • vitamin E 1000 UNIT capsule Take 1,000 Units by mouth Daily.       No facility-administered encounter medications on file as of 12/20/2019.      ADULT ILLNESSES:   Dcis of the breast ( ICD-10:D05.12 ;Intraductal carcinoma in situ of left breast   Personal history of breast cancer ( ICD-10:Z85.3 ;Personal history of malignant neoplasm of breast   LINDA - Lorenzana's oesophagus ( ICD-10:K22.70 ;Lorenzana's esophagus without dysplasia   Depression ( ICD-10:F32.9 ;Major depressive disorder, single episode, unspecified   Hypercholesterolemia ( ICD-10:E78.00 ;Pure hypercholesterolemia, unspecified   Immunodeficiency disorder (disorder) ( ICD-10:D84.9 ;Immunodeficiency, unspecified   Osteoarthritis knee ( degenerative joint disease of knees; ICD-10:M17.0 ;Bilateral primary osteoarthritis of knee )   Syncope and collapse ( vasomotor instability; ICD-10:R55 ;Syncope and collapse )    SURGERIES:   Upper GI endoscopy, 02/17/2016   Knee surgery, repeat, 1984   Knee surgery, 1980   Dissection procedure: Formal left axillary node dissection, 04/26/2005   Breast implantation, bilateral, 11/09/2018, Dr. Mark   Colonoscopy, 2006   Endoscopy, 2011   Lumpectomy of breast, left, with sentinel node biopsy, 04/12/2005   Colonoscopy, 02/17/2016: Per Dr. Park's note: ASC lymphoid follicle, TV x2 polypoid lesions that were not polyps/hyperplastic change, diverticulosis   Cone biopsy of cervix, 11/2004   Endoscopy performed on  02/28/2019 at [facility]. - LA Grade B reflux esophagitis. - Esophageal mucosal changes secondary to established short-segment Lorenzana's disease. Biopsied. - Medium-sized hiatal hernia. - Normal stomach. - Normal examined duodenum.       ADULT ILLNESSES:  Patient Active Problem List   Diagnosis Code   • History of Lorenzana's esophagus Z87.19   • History of esophagitis Z87.19   • History of breast cancer Z85.3   • Ductal carcinoma in situ (DCIS) of left breast D05.12   • Atypical ductal hyperplasia of left breast N60.92   • History of thoracic surgery Z98.890     SURGERIES:  Past Surgical History:   Procedure Laterality Date   • ADENOIDECTOMY     • BREAST AUGMENTATION Bilateral    • BREAST BIOPSY Left 2005   • BREAST LUMPECTOMY      2005   • COLONOSCOPY  02/17/2016    Colon polyps; Diverticulosis; Repeat 3-5 years    • COLONOSCOPY  02/26/2007    Dr. Matute-Repeat in 5 years    • ENDOSCOPY  02/17/2016    Hiatal hernia, LA Grade C and Lorenzana's   • ENDOSCOPY  10/03/2011    Hiatal hernia; Lorenzana's    • ENDOSCOPY  02/22/2010    Dr. Matute-hiatal hernia; Lorenzana's    • ENDOSCOPY  12/22/2009    Dr. Matute-Esophageal ulcers; gastritis; hiatal hernia-Repeat in 2 months    • ENDOSCOPY N/A 2/28/2019    Procedure: ESOPHAGOGASTRODUODENOSCOPY WITH ANESTHESIA;  Surgeon: Sylvia Park MD;  Location: [facility] ENDOSCOPY;  Service: Gastroenterology   • KNEE ARTHROPLASTY     • MASTECTOMY Bilateral    • TONSILLECTOMY       HEALTH MAINTENANCE ITEMS:  Health Maintenance Due   Topic Date Due   • ANNUAL PHYSICAL  08/31/1959   • TDAP/TD VACCINES (1 - Tdap) 08/31/1967   • ZOSTER VACCINE (1 of 2) 08/31/2006   • HEPATITIS C SCREENING  01/17/2017   • INFLUENZA VACCINE  08/01/2019       <no information>  Last Completed Colonoscopy       Status Date      COLONOSCOPY Done 3/8/2019 [facility]     Patient has more history with this topic...          There is no immunization history on file for this patient.  Last Completed Mammogram        "Status Date      MAMMOGRAM Done 3/8/2019      Patient has more history with this topic...            FAMILY HISTORY:  Family History   Problem Relation Age of Onset   • Stroke Father    • Leukemia Mother    • No Known Problems Brother    • No Known Problems Sister    • No Known Problems Sister    • Breast cancer Paternal Aunt 60   • No Known Problems Daughter    • No Known Problems Son    • No Known Problems Maternal Grandmother    • No Known Problems Paternal Grandmother    • No Known Problems Maternal Aunt    • Breast cancer Other 48   • Ovarian cancer Neg Hx    • Uterine cancer Neg Hx    • Colon cancer Neg Hx    • Melanoma Neg Hx    • Prostate cancer Neg Hx    • BRCA 1/2 Neg Hx    • Endometrial cancer Neg Hx      SOCIAL HISTORY:  Social History     Socioeconomic History   • Marital status: Single     Spouse name: Not on file   • Number of children: Not on file   • Years of education: Not on file   • Highest education level: Not on file   Tobacco Use   • Smoking status: Former Smoker   • Smokeless tobacco: Never Used   Substance and Sexual Activity   • Alcohol use: Yes     Comment: Rarely   • Drug use: No   • Sexual activity: Yes       REVIEW OF SYSTEMS:  Constitutional:   The patient's appetite and energy are fair to fairly good. \"Healthy as a horse.\" She manages all her ADLs and remains active. 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 She has lost 2 pounds (had gained 1 pound at her prior visit) since her last visit. She has no fevers, chills, or drenching night sweats. Her sleep habits are erratic.  Ear/Nose/Mouth/Throat:   She reports no ear pains, sinus symptoms, sore throat, nosebleeds, or sore tongue. She reports no headaches. She reports no hoarseness, change in voice quality, or hemoptysis.  Ocular:   She reports no eye pain, significant change in visual acuity, double vision, or blurry vision.  Respiratory:   She reports no chronic cough, significant shortness of breathing, phlegm production, or " "unexplained chest wall pain.  Cardiovascular:   She reports no exertional chest pain, chest pressure, or chest heaviness. She reports no claudication. She reports no palpitations or symptomatic orthostasis.  Gastrointestinal:   She reports no epigastric \"discomfort\" on PPI and otherwise has no dysphagia, nausea, vomiting, postprandial abdominal pain, bloating, cramping, change in bowel habits, or discoloration of the stool. She reports no rectal bleeding. She reports no constipation or diarrhea. Had EGD, 02/2019 (above).  Genitourinary:   She reports no urinary burning, frequency, dribbling, or discoloration. She reports no need to urinate frequently through the night. She reports no difficulty controlling her bladder.  GYN:   She reports no unexplained vaginal bleeding and no significant vaginal discharge.  Breasts:   She reports no breast discomfort since the implant placements. \"I'm accepting them.\"  Musculoskeletal:   She has no unexplained arthralgias, myalgias, or nighttime leg cramping.  Extremities:   She reports no trouble with fluid retention or significant leg swelling.  Heme/Lymphatic:   She reports no unexplained bleeding, bruising, petechial rash, or swollen glands.  Skin:   She reports no itching, rashes, or lesions which won't heal.  Neuro:   She reports no loss of consciousness, seizures, fainting spells, or dizziness. She reports no weakness of her face, arms, or legs. She reports no difficulty with speech. She has no tremors.  Psych:   She seems generally satisfied with life. She reports no depression. She reports no mood swings.          VITAL SIGNS: /68   Pulse 88   Temp 96.2 °F (35.7 °C)   Resp 16   Ht 162.6 cm (64\")   Wt 63.9 kg (140 lb 12.8 oz)   LMP 01/19/2010 (Approximate)   SpO2 98%   Breastfeeding No   BMI 24.17 kg/m² Body surface area is 1.69 meters squared.  Pain Score    12/20/19 0838   PainSc: 0-No pain         PHYSICAL EXAMINATION:   General:   She is a well-developed, " well-nourished, and modestly-kept middle aged female in no distress. She arrived in the exam room ambulatory. She appears to be her stated age. Her skin color is normal. ECOG PS = 0.  Head/Neck:   The patient is anicteric and atraumatic. The trachea is midline. The neck is supple without evidence of jugular venous distention or cervical adenopathy.  Eyes:   The pupils are equal, round, and reactive to light. The extraocular movements are full. There is no scleral jaundice or erythema.  Chest:   The respiratory efforts are normal and unhindered. The chest is clear to auscultation. There are no wheezes, rhonchi, rales, or asymmetry of breath sounds.  Breasts:   The right breast is notable for an implant. No axillary adenopathy. The left breast is notable for an implant. No axillary adenopathy.  Cardiovascular:   The patient has a regular cardiac rate and rhythm without murmurs, rubs, or gallops. The peripheral pulses are equal and full.  Extremities:   There is no evidence of cyanosis, clubbing, or edema.   Cutaneous:   There is no longer evidence of axillary intertrigo. She has no other overt rashes, disseminated lesions, purpura, or petechiae.  Lymphatics:   There is no evidence of adenopathy in the cervical, supraclavicular, axillary areas areas.   Neurologic:   The patient is alert, oriented, cooperative, and pleasant. She is appropriately conversant. She ambulated into the exam room without assistance and transferred from chair to exam table unaided. There is no overt dysfunction of the motor, sensory, or cerebellar systems.  Psych:   Mood and affect are appropriate for circumstance. Eye contact is appropriate.        LABS    Lab Results - Last 18 Months   Lab Units 12/13/19  0643 06/24/19  0612 03/12/19  0709 01/04/19  0617 09/14/18  0612 07/14/18  0301 07/03/18  0900   HEMOGLOBIN g/dL 13.4 12.7 13.7 13.3 12.8 11.6* 13.0   HEMATOCRIT % 40.1 37.7 42.5 40.5 38.8 36.0* 40.8   MCV fL 89.7 90.0 93.6 91.0 90.7 96.3  95.8   WBC 10*3/mm3 4.92 4.88 4.55* 4.85 4.71* 12.9* 5.9   RDW % 12.2* 12.3 12.3 12.1 12.1 12.3 12.4   MPV fL 11.8 11.5  --  11.0 10.9 11.8 12.4*   PLATELETS 10*3/mm3 229 205 212 213 239 187 191   IMM GRAN % % 0.2 0.4  --  0.2 0.2  --   --    NEUTROS ABS 10*3/mm3 2.82 2.51 2.60 2.84 2.29 10.5* 3.5   LYMPHS ABS 10*3/mm3 1.43 1.63 1.28 1.27 1.64 1.3 1.7   MONOS ABS 10*3/mm3 0.45 0.48 0.52 0.51 0.50 1.00* 0.50   EOS ABS 10*3/mm3 0.11 0.17 0.08 0.16 0.21 0.00 0.10   BASOS ABS 10*3/mm3 0.10 0.07 0.06 0.06 0.06 0.00 0.10   IMMATURE GRANS (ABS) 10*3/mm3 0.01 0.02  --  0.01 0.01  --   --    NRBC /100 WBC 0.0 0.0 0.0 0.0 0.0  --   --        Lab Results - Last 18 Months   Lab Units 12/13/19  0643 06/24/19  0612 03/12/19  0709 01/04/19  0617 09/14/18  0612   GLUCOSE mg/dL 95 109*  --  104* 106*   SODIUM mmol/L 142 139 138 137 138   POTASSIUM mmol/L 4.3 4.0 4.6 4.2 4.2   TOTAL CO2 mmol/L  --   --  26.0  --   --    CO2 mmol/L 27.7 28.0  --  28.0 25.0   CHLORIDE mmol/L 103 104 103 100 103   ANION GAP mmol/L 11.3 7.0  --  9.0 10.0   CREATININE mg/dL 0.70 0.67 0.61 0.66 0.64   BUN mg/dL 14 18 14 14 16   BUN / CREAT RATIO  20.0 26.9* 23.0 21.2 25.0   CALCIUM mg/dL 9.3 8.8 9.3 9.2 8.6   EGFR IF NONAFRICN AM mL/min/1.73 85 89 99 91 94   ALK PHOS U/L 87 69 85 80 81   TOTAL PROTEIN g/dL 7.1 6.4  --  6.7 6.6   ALT (SGPT) U/L 15 21 22 22 31   AST (SGOT) U/L 18 23 20 20 23   BILIRUBIN mg/dL 0.4 0.5 0.5 0.5 0.4   ALBUMIN g/dL 4.40 3.80 4.20 4.10 4.00   GLOBULIN gm/dL 2.7 2.6  --  2.6 2.6       Lab Results - Last 18 Months   Lab Units 12/13/19  0643 06/24/19  0613 01/04/19  0617 09/14/18  0612   CEA ng/mL 3.27 2.01 1.98 2.14       Lab Results - Last 18 Months   Lab Units 03/12/19  0709   TSH mIU/mL 2.070       ASSESSMENT:   1. Ductal carcinoma in situ (DCIS):   Stage: AJCC Stage: pTis (DCIS) pNX. ER, 04/05/2018: On same specimen 2 blocks - #1450-5: ER (+) 29%; #1450-3: ER (-) 0%.   Tumor [place]: Left breast, excisional biopsy with needle  "localization, 03/30/2018 (Dr. Alejandro). FINAL DIAGNOSIS: 1. A few scattered foci of intermediate grade ductal carcinoma in situ. 2. No invasive malignancy identified. 3. Changes of previous biopsy site including fat necrosis and cicatrix formation. 4. No ductal carcinoma in situ identified at the inked margins of surgical excision   Tumor Status following excisional biopsy, 03/30/2018 and bilateral nipple sparing mastectomies, 07/13/2018: ALFREDO  2. Personal history of carcinoma of the left breast, grade 3 infiltrating ductal carcinoma:   Stage: IIB (pT2 pN1 N0 G4) ER 82%, ID 9%, HER-2/analisa +2.   Baseline Node Status: 2 of 4 sentinel nodes positive.   Tumor [place]: No evidence of malignancy.   Tumor Status: Mammogram, bilateral, [facility]30/[facility]) Comparison: 01/27/2016, 03/23/2015, 02/19/2013,01/21/2011. No mammographic evidence of malignancy. The patient should return for screening mammography in 12 months or sooner, if clinically indicated.   Prognosis: Good.  3. Vasomotor instability. Resolved off Tamoxifen.   4. Intermittent (mild) leukopenia. Observation warranted. Normal since 06/24/2019.   5. Hypercholesterolemia.   6. 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   7. History of depression.   8. Self-directed. Had previously declined scheduled repeat EGD per Dr. Park.   a. Letter from Dr. Park (patient missed EGD). On 11/20/2014, we called the patient to let her know that we received a letter from GI (Dr. Park) noting patient declined repeat EGD Had previously explained to the patient of the letter advising repeat EGD for Lorenzana's esophagus. Had strongly encouraged the patient to follow through. She had stated \"I don't think it is necessary and I have already called Dr. Park's office telling them so and I don't see the need in this.\"   b. Discussed again at her visit on 02/04/2015. She replies that \"I'm doing fine and I don't want to be pressured into this. I'll discuss this with somebody else " "before deciding.\"   c. Discussed again at her visit last 08/05/2015. She said, \"I don't want to be probed or prodded unless I need to be.\"   d. Upper GI endoscopy, 02/17/2016 ([facility]), - Small hiatus hernia - LA Grade C reflux esophagitis. Esophageal mucosal changes secondary to established short-segment Lorenzana's disease. Biopsied. Normal stomach. Normal examined duodenum.   e. Endoscopy performed on 02/28/2019 at [facility]. - LA Grade B reflux esophagitis. - Esophageal mucosal changes secondary to established short-segment Lorenzana's disease. Biopsied. - Medium-sized hiatal hernia. - Normal stomach. - Normal examined duodenum.    PLAN:  1.   Re: Apprised of labs from 12/13/2019 with normal CBC, normal CMP, slightly elevated (3.27; from 2.01; from 1.98; from 2.14) CEA, normal CA 27-29.   2.  Previously discussed surgeries for permanent breast implants last 11/09/2018. Previously reviewed op note, pathology, and discharge summary on 07/15/2018 from Dr. Alejandro. Patient had bilateral nipple sparing mastectomies on 07/13/2018.   3.  Previously discussed that on biopsy of 03/30/2018, ER reported, 04/05/2018: On same specimen 2 blocks - #1450-5: ER (+) 29%; #1450-3: ER (-) 0%. The latter discussed and clarified with Dr. Vinny Lomeli and Dr. Parisa Nagel of pathology who confirmed that these are from the same specimen but heterogeneously expressed ER.   4.  Diagnostic information previously discussed. Previously reviewed imaging studies (bilateral mammogram, 01/31/2018 and unilateral left diagnostic mammogram, 02/05/2018), path from left breast core biopsies at 1 o’clock position, 03/05/3018 showing focal atypical ductal hyperplasia (0.1 cm), then needle localization and excisional biopsy of left breast mass, 03/30/2018 and office encounters from Dr. Alejandro, most recently on 04/30/2018. \"After a prolonged discussion lasting 60 minutes, we felt it was most appropriate that she undergo close " "observation and no surgical intervention at present. Her DCIS has not been sent for ER yet. We will make sure this is sent. We will also send for Prelude DX. She will call back in 2 weeks to discuss her ER/ND positivity or not. I will see her back in 5 months with a unilateral left mammogram.\" I discussed the options (see #5 following). Underwent bilateral mastectomies on 07/13/2018 (above).   5.  Ductal carcinoma in situ (DCIS) previously discussed. I had noted that many, but not all cases of DCIS progress to invasive carcinoma within a woman's lifetime. The available therapies were reviewed:  a) Mastectomy is curative 98-99% of the time (Given the information available, I feel that this is the procedure the patient will benefit the most from, and the procedure she favors anyway).   b) Localized DCIS may be treated with breast sparing surgery and irradiation (recurrence rate = 9-17%).   c) Excision alone may be considered in those with less than 1-2 cm low grade lesions.   d) Tamoxifen should be considered to reduce (from 13% to 8%) the risk of ipsilateral breast recurrence after breast sparing surgery, and to reduce the risk of contralateral breast cancer in all women.   6.  Previously acknowledged her self-directed approach.   7.  Continue currently identified medications.   8.  Continue management per primary care and other specialists.   9.  Return to office in 24 weeks with pre-office CBC with differential, CEA, CA27-29, and CMP.     MEDICAL DECISION MAKING: Moderate Complexity   AMOUNT OF DATA: Moderate     TIME SPENT: Face-to-face time on this encounter, as defined by the American Medical Association in the 2019 Current Procedural Terminology codebook; assessment, record review, lab review, planning and education - 25 minutes (greater than 50% facetime).     cc: MD William Cummings MD Blair Tolar, MD Pamela Reed, MD         "

## 2019-12-20 ENCOUNTER — OFFICE VISIT (OUTPATIENT)
Dept: ONCOLOGY | Facility: CLINIC | Age: 64
End: 2019-12-20

## 2019-12-20 VITALS
WEIGHT: 140.8 LBS | OXYGEN SATURATION: 98 % | RESPIRATION RATE: 16 BRPM | BODY MASS INDEX: 24.04 KG/M2 | HEIGHT: 64 IN | HEART RATE: 88 BPM | SYSTOLIC BLOOD PRESSURE: 116 MMHG | DIASTOLIC BLOOD PRESSURE: 68 MMHG | TEMPERATURE: 96.2 F

## 2019-12-20 DIAGNOSIS — Z85.3 HISTORY OF BREAST CANCER: Primary | ICD-10-CM

## 2019-12-20 DIAGNOSIS — D05.12 DUCTAL CARCINOMA IN SITU (DCIS) OF LEFT BREAST: ICD-10-CM

## 2019-12-20 PROBLEM — N60.92 ATYPICAL DUCTAL HYPERPLASIA OF LEFT BREAST: Status: ACTIVE | Noted: 2018-03-05

## 2019-12-20 PROCEDURE — 99214 OFFICE O/P EST MOD 30 MIN: CPT | Performed by: INTERNAL MEDICINE

## 2019-12-20 RX ORDER — CHLORAL HYDRATE 500 MG
CAPSULE ORAL
COMMUNITY
End: 2021-06-28 | Stop reason: ALTCHOICE

## 2019-12-20 RX ORDER — PSYLLIUM SEED (WITH DEXTROSE)
POWDER (GRAM) ORAL DAILY
COMMUNITY
End: 2021-06-28 | Stop reason: ALTCHOICE

## 2020-01-31 ENCOUNTER — TELEPHONE (OUTPATIENT)
Dept: ONCOLOGY | Facility: CLINIC | Age: 65
End: 2020-01-31

## 2020-01-31 NOTE — TELEPHONE ENCOUNTER
Patient said she usually gets paperwork with lab orders to get bloodwork in June, as she gets this at [facility]. Was wanting to know if she need's this paperwork to get bloodwork or if it will already be at the [facility].    [phone]

## 2020-02-17 ENCOUNTER — OFFICE VISIT (OUTPATIENT)
Dept: OBSTETRICS AND GYNECOLOGY | Facility: CLINIC | Age: 65
End: 2020-02-17

## 2020-02-17 VITALS
DIASTOLIC BLOOD PRESSURE: 74 MMHG | HEIGHT: 64 IN | SYSTOLIC BLOOD PRESSURE: 126 MMHG | BODY MASS INDEX: 24.07 KG/M2 | WEIGHT: 141 LBS

## 2020-02-17 DIAGNOSIS — N89.8 VAGINAL DRYNESS: Primary | ICD-10-CM

## 2020-02-17 DIAGNOSIS — N89.8 VAGINAL DISCHARGE: ICD-10-CM

## 2020-02-17 DIAGNOSIS — N94.10 DYSPAREUNIA IN FEMALE: ICD-10-CM

## 2020-02-17 PROCEDURE — 87661 TRICHOMONAS VAGINALIS AMPLIF: CPT | Performed by: NURSE PRACTITIONER

## 2020-02-17 PROCEDURE — 99213 OFFICE O/P EST LOW 20 MIN: CPT | Performed by: NURSE PRACTITIONER

## 2020-02-17 PROCEDURE — 87481 CANDIDA DNA AMP PROBE: CPT | Performed by: NURSE PRACTITIONER

## 2020-02-17 PROCEDURE — 87798 DETECT AGENT NOS DNA AMP: CPT | Performed by: NURSE PRACTITIONER

## 2020-02-17 PROCEDURE — 87512 GARDNER VAG DNA QUANT: CPT | Performed by: NURSE PRACTITIONER

## 2020-02-17 PROCEDURE — 87563 M. GENITALIUM AMP PROBE: CPT | Performed by: NURSE PRACTITIONER

## 2020-02-17 NOTE — PATIENT INSTRUCTIONS

## 2020-02-17 NOTE — PROGRESS NOTES
Attempted to obtain health maintenance information, patient unable to provide answers for the following items Tdap, Pneumococcal Vaccine, Medicare Annual Wellness Exam, Diabetic Eye Exam, Diabetic Foot Exam, Chlamydia Screening  and Zoster Vaccine.

## 2020-02-17 NOTE — PROGRESS NOTES
"Shirley Kerns is a 64 y.o. female    Patient comes in today with complaint of vaginal irritation and possibly discharge.  She has had pain with intercourse and it feels it is related to the dryness.  She stopped using her coconut oil approximately a month ago.    Vaginal Discharge   The patient's primary symptoms include vaginal discharge. The patient's pertinent negatives include no pelvic pain or vaginal bleeding. The patient is experiencing no pain. Associated symptoms include painful intercourse. Pertinent negatives include no abdominal pain, anorexia, back pain, chills, constipation, diarrhea, discolored urine, dysuria, fever, flank pain, frequency, headaches, hematuria, joint pain, joint swelling, nausea, rash, sore throat, urgency or vomiting. The vaginal discharge was scant. There has been no bleeding. She has not been passing clots. She has not been passing tissue. She is sexually active. She is postmenopausal.         /74   Ht 162.6 cm (64\")   Wt 64 kg (141 lb)   LMP 01/19/2010 (Approximate)   Breastfeeding No   BMI 24.20 kg/m²     Outpatient Encounter Medications as of 2/17/2020   Medication Sig Dispense Refill   • Calcium Carb-Cholecalciferol (CALCIUM 500/D) 500-400 MG-UNIT chewable tablet Chew.     • Omega-3 Fatty Acids (OMEGA-3 FISH OIL) 1000 MG capsule Take  by mouth.     • Psyllium (METAMUCIL) wafer wafer Take  by mouth Daily.       No facility-administered encounter medications on file as of 2/17/2020.        Surgical History  Past Surgical History:   Procedure Laterality Date   • ADENOIDECTOMY     • BREAST AUGMENTATION Bilateral    • BREAST BIOPSY Left 2005   • BREAST LUMPECTOMY      2005   • COLONOSCOPY  02/17/2016    Colon polyps; Diverticulosis; Repeat 3-5 years    • COLONOSCOPY  02/26/2007    Dr. Matute-Repeat in 5 years    • ENDOSCOPY  02/17/2016    Hiatal hernia, LA Grade C and Lorenzana's   • ENDOSCOPY  10/03/2011    Hiatal hernia; Lorenzana's    • ENDOSCOPY  " 02/22/2010    Dr. Matute-hiatal hernia; Lorenzana's    • ENDOSCOPY  12/22/2009    Dr. Matute-Esophageal ulcers; gastritis; hiatal hernia-Repeat in 2 months    • ENDOSCOPY N/A 2/28/2019    Procedure: ESOPHAGOGASTRODUODENOSCOPY WITH ANESTHESIA;  Surgeon: Sylvia Park MD;  Location: [facility] ENDOSCOPY;  Service: Gastroenterology   • KNEE ARTHROPLASTY     • MASTECTOMY Bilateral    • TONSILLECTOMY         Family History  Family History   Problem Relation Age of Onset   • Stroke Father    • Leukemia Mother    • No Known Problems Brother    • No Known Problems Sister    • No Known Problems Sister    • Breast cancer Paternal Aunt 60   • No Known Problems Daughter    • No Known Problems Son    • No Known Problems Maternal Grandmother    • No Known Problems Paternal Grandmother    • No Known Problems Maternal Aunt    • Breast cancer Other 48   • Ovarian cancer Neg Hx    • Uterine cancer Neg Hx    • Colon cancer Neg Hx    • Melanoma Neg Hx    • Prostate cancer Neg Hx    • BRCA 1/2 Neg Hx    • Endometrial cancer Neg Hx        The following portions of the patient's history were reviewed and updated as appropriate: allergies, current medications, past family history, past medical history, past social history, past surgical history and problem list.    Review of Systems   Constitutional: Negative for activity change, appetite change, chills, diaphoresis, fatigue, fever, unexpected weight gain and unexpected weight loss.   HENT: Negative for congestion, dental problem, drooling, ear discharge, ear pain, facial swelling, hearing loss, mouth sores, nosebleeds, postnasal drip, rhinorrhea, sinus pressure, sneezing, sore throat, swollen glands, tinnitus, trouble swallowing and voice change.    Eyes: Negative for blurred vision, double vision, photophobia, pain, discharge, redness, itching and visual disturbance.   Respiratory: Negative for apnea, cough, choking, chest tightness, shortness of breath, wheezing and stridor.     Cardiovascular: Negative for chest pain, palpitations and leg swelling.   Gastrointestinal: Negative for abdominal distention, abdominal pain, anal bleeding, anorexia, blood in stool, constipation, diarrhea, nausea, rectal pain, vomiting, GERD and indigestion.   Endocrine: Negative for cold intolerance, heat intolerance, polydipsia, polyphagia and polyuria.   Genitourinary: Positive for dyspareunia and vaginal discharge. Negative for amenorrhea, breast discharge, breast lump, breast pain, decreased libido, decreased urine volume, difficulty urinating, dysuria, flank pain, frequency, genital sores, hematuria, menstrual problem, pelvic pain, pelvic pressure, urgency, urinary incontinence, vaginal bleeding and vaginal pain.   Musculoskeletal: Negative for arthralgias, back pain, gait problem, joint pain, joint swelling, myalgias, neck pain, neck stiffness and bursitis.   Skin: Negative for color change, dry skin and rash.   Allergic/Immunologic: Negative for environmental allergies, food allergies and immunocompromised state.   Neurological: Negative for dizziness, tremors, seizures, syncope, facial asymmetry, speech difficulty, weakness, light-headedness, numbness, headache, memory problem and confusion.   Hematological: Negative for adenopathy. Does not bruise/bleed easily.   Psychiatric/Behavioral: Negative for agitation, behavioral problems, decreased concentration, dysphoric mood, hallucinations, self-injury, sleep disturbance, suicidal ideas, negative for hyperactivity, depressed mood and stress. The patient is not nervous/anxious.        Objective   Physical Exam   Constitutional: She is oriented to person, place, and time. She appears well-developed and well-nourished.   HENT:   Head: Normocephalic and atraumatic.   Abdominal: Soft. She exhibits no distension. There is no tenderness.   Genitourinary: Uterus normal. There is no rash, tenderness, lesion or injury on the right labia. There is no rash,  tenderness, lesion or injury on the left labia. Uterus is not enlarged and not tender. Cervix exhibits no motion tenderness, no discharge and no friability. Right adnexum displays no mass, no tenderness and no fullness. Left adnexum displays no mass, no tenderness and no fullness. No erythema, tenderness or bleeding in the vagina. Vaginal discharge found.   Genitourinary Comments: Vaginal vault is very atrophic.  Just a small amount of white discharge is noted.   Neurological: She is alert and oriented to person, place, and time.   Skin: Skin is warm and dry.   Psychiatric: She has a normal mood and affect. Her behavior is normal. Judgment and thought content normal.   Nursing note and vitals reviewed.      Assessment/Plan   Evelia was seen today for vaginal discharge.    Diagnoses and all orders for this visit:    Vaginal dryness  Comments:  Patient is very atrophic.  She stopped using her coconut oil approximately a month ago she is encouraged to restart that.    Vaginal discharge  Comments:  Patient has a small amount of white discharge.  BV panel was sent to lab.  If patient requires medication she would prefer vaginal creams.  Orders:  -     Gynecologic Fluid, Supplemental Testing    Dyspareunia in female  Comments:  Patient is contemplating doing Diana Lindsey for the vaginal dryness as it is causing extreme pain with intercourse.  She will try the coconut oil first.  Has yearly scheduled next month.         Patient's Body mass index is 24.2 kg/m². BMI is within normal parameters. No follow-up required..      Nano Spence, APRN  2/17/2020

## 2020-02-19 LAB — TRICHOMONAS VAGINALIS PCR: NOT DETECTED

## 2020-02-20 LAB
LAB AP CASE REPORT: NORMAL
Lab: NORMAL
PATH INTERP SPEC-IMP: NORMAL
STAT OF ADQ CVX/VAG CYTO-IMP: NORMAL

## 2020-06-22 ENCOUNTER — TELEPHONE (OUTPATIENT)
Dept: OBSTETRICS AND GYNECOLOGY | Facility: CLINIC | Age: 65
End: 2020-06-22

## 2020-06-22 NOTE — TELEPHONE ENCOUNTER
Evelia calls = has appt on 6/30.  States her friend's sister tested positive for COVID 19. The sister is being tested.  Pt states she had abdominal cramping with loose stools last night, but feels fine today.  Asking if she needs to cancel next week's appt.  Instructed to see what friend's results are and if she has been with her then pt to self quarantine, otherwise to keep her appt. She verbalizes understanding.

## 2020-06-30 ENCOUNTER — OFFICE VISIT (OUTPATIENT)
Dept: OBSTETRICS AND GYNECOLOGY | Facility: CLINIC | Age: 65
End: 2020-06-30

## 2020-06-30 VITALS
WEIGHT: 142 LBS | BODY MASS INDEX: 24.24 KG/M2 | SYSTOLIC BLOOD PRESSURE: 136 MMHG | HEIGHT: 64 IN | DIASTOLIC BLOOD PRESSURE: 78 MMHG

## 2020-06-30 DIAGNOSIS — D05.12 DUCTAL CARCINOMA IN SITU (DCIS) OF LEFT BREAST: ICD-10-CM

## 2020-06-30 DIAGNOSIS — Z01.419 WELL WOMAN EXAM WITH ROUTINE GYNECOLOGICAL EXAM: Primary | ICD-10-CM

## 2020-06-30 DIAGNOSIS — Z13.820 ENCOUNTER FOR SCREENING FOR OSTEOPOROSIS: ICD-10-CM

## 2020-06-30 DIAGNOSIS — E55.9 VITAMIN D DEFICIENCY: ICD-10-CM

## 2020-06-30 DIAGNOSIS — Z91.89 HIGH RISK FOR COLON CANCER: ICD-10-CM

## 2020-06-30 PROCEDURE — 99396 PREV VISIT EST AGE 40-64: CPT | Performed by: NURSE PRACTITIONER

## 2020-06-30 PROCEDURE — 87624 HPV HI-RISK TYP POOLED RSLT: CPT | Performed by: NURSE PRACTITIONER

## 2020-06-30 PROCEDURE — G0123 SCREEN CERV/VAG THIN LAYER: HCPCS | Performed by: NURSE PRACTITIONER

## 2020-06-30 NOTE — PROGRESS NOTES
"Shirley Kerns is a 64 y.o. female    Patient is here for yearly checkup.  Her only complaint is of vaginal dryness.  She has been using coconut oil with good results.    Gynecologic Exam   The patient's pertinent negatives include no pelvic pain, vaginal bleeding or vaginal discharge. The patient is experiencing no pain. Pertinent negatives include no abdominal pain, anorexia, back pain, chills, constipation, diarrhea, discolored urine, dysuria, fever, flank pain, frequency, headaches, hematuria, joint pain, joint swelling, nausea, painful intercourse, rash, sore throat, urgency or vomiting. She is sexually active. She is postmenopausal.         /78   Ht 162.6 cm (64\")   Wt 64.4 kg (142 lb)   LMP 01/19/2010 (Approximate)   Breastfeeding No   BMI 24.37 kg/m²     Outpatient Encounter Medications as of 6/30/2020   Medication Sig Dispense Refill   • B Complex-C-E-Zn (B COMPLEX-C-E-ZINC) tablet Take 1 tablet by mouth Daily.     • Calcium Carb-Cholecalciferol (CALCIUM 500/D) 500-400 MG-UNIT chewable tablet Chew.     • Omega-3 Fatty Acids (OMEGA-3 FISH OIL) 1000 MG capsule Take  by mouth.     • Psyllium (METAMUCIL) wafer wafer Take  by mouth Daily.       No facility-administered encounter medications on file as of 6/30/2020.        Surgical History  Past Surgical History:   Procedure Laterality Date   • ADENOIDECTOMY     • BREAST AUGMENTATION Bilateral    • BREAST BIOPSY Left 2005   • BREAST LUMPECTOMY      2005   • COLONOSCOPY  02/17/2016    Colon polyps; Diverticulosis; Repeat 3-5 years    • COLONOSCOPY  02/26/2007    Dr. Matute-Repeat in 5 years    • ENDOSCOPY  02/17/2016    Hiatal hernia, LA Grade C and Lorenzana's   • ENDOSCOPY  10/03/2011    Hiatal hernia; Lorenzana's    • ENDOSCOPY  02/22/2010    Dr. Matute-hiatal hernia; Lorenzana's    • ENDOSCOPY  12/22/2009    Dr. Matute-Esophageal ulcers; gastritis; hiatal hernia-Repeat in 2 months    • ENDOSCOPY N/A 2/28/2019    Procedure: " ESOPHAGOGASTRODUODENOSCOPY WITH ANESTHESIA;  Surgeon: Sylvia Park MD;  Location: [facility] ENDOSCOPY;  Service: Gastroenterology   • KNEE ARTHROPLASTY     • MASTECTOMY Bilateral    • TONSILLECTOMY         Family History  Family History   Problem Relation Age of Onset   • Stroke Father    • Leukemia Mother    • No Known Problems Brother    • No Known Problems Sister    • No Known Problems Sister    • Breast cancer Paternal Aunt 60   • No Known Problems Daughter    • No Known Problems Son    • No Known Problems Maternal Grandmother    • No Known Problems Paternal Grandmother    • No Known Problems Maternal Aunt    • Breast cancer Other 48   • Ovarian cancer Neg Hx    • Uterine cancer Neg Hx    • Colon cancer Neg Hx    • Melanoma Neg Hx    • Prostate cancer Neg Hx    • BRCA 1/2 Neg Hx    • Endometrial cancer Neg Hx        The following portions of the patient's history were reviewed and updated as appropriate: allergies, current medications, past family history, past medical history, past social history, past surgical history and problem list.    Review of Systems   Constitutional: Negative for activity change, appetite change, chills, diaphoresis, fatigue, fever, unexpected weight gain and unexpected weight loss.   HENT: Negative for congestion, dental problem, drooling, ear discharge, ear pain, facial swelling, hearing loss, mouth sores, nosebleeds, postnasal drip, rhinorrhea, sinus pressure, sneezing, sore throat, swollen glands, tinnitus, trouble swallowing and voice change.    Eyes: Negative for blurred vision, double vision, photophobia, pain, discharge, redness, itching and visual disturbance.   Respiratory: Negative for apnea, cough, choking, chest tightness, shortness of breath, wheezing and stridor.    Cardiovascular: Negative for chest pain, palpitations and leg swelling.   Gastrointestinal: Negative for abdominal distention, abdominal pain, anal bleeding, anorexia, blood in stool, constipation, diarrhea,  nausea, rectal pain, vomiting, GERD and indigestion.   Endocrine: Negative for cold intolerance, heat intolerance, polydipsia, polyphagia and polyuria.   Genitourinary: Negative for amenorrhea, breast discharge, breast lump, breast pain, decreased libido, decreased urine volume, difficulty urinating, dyspareunia, dysuria, flank pain, frequency, genital sores, hematuria, menstrual problem, pelvic pain, pelvic pressure, urgency, urinary incontinence, vaginal bleeding, vaginal discharge and vaginal pain.   Musculoskeletal: Negative for arthralgias, back pain, gait problem, joint pain, joint swelling, myalgias, neck pain, neck stiffness and bursitis.   Skin: Negative for color change, dry skin and rash.   Allergic/Immunologic: Negative for environmental allergies, food allergies and immunocompromised state.   Neurological: Negative for dizziness, tremors, seizures, syncope, facial asymmetry, speech difficulty, weakness, light-headedness, numbness, headache, memory problem and confusion.   Hematological: Negative for adenopathy. Does not bruise/bleed easily.   Psychiatric/Behavioral: Negative for agitation, behavioral problems, decreased concentration, dysphoric mood, hallucinations, self-injury, sleep disturbance, suicidal ideas, negative for hyperactivity, depressed mood and stress. The patient is not nervous/anxious.        Objective   Physical Exam   Constitutional: She is oriented to person, place, and time. She appears well-developed and well-nourished. No distress.   HENT:   Head: Normocephalic.   Right Ear: External ear normal.   Left Ear: External ear normal.   Nose: Nose normal.   Mouth/Throat: Oropharynx is clear and moist.   Eyes: Conjunctivae are normal. Right eye exhibits no discharge. Left eye exhibits no discharge. No scleral icterus.   Neck: Normal range of motion. Neck supple. Carotid bruit is not present. No tracheal deviation present. No thyromegaly present.   Cardiovascular: Normal rate, regular  rhythm, normal heart sounds and intact distal pulses.   No murmur heard.  Pulmonary/Chest: Effort normal and breath sounds normal. No respiratory distress. She has no wheezes. Right breast exhibits no inverted nipple, no mass, no nipple discharge, no skin change and no tenderness. Left breast exhibits no inverted nipple, no mass, no nipple discharge, no skin change and no tenderness. No breast swelling, tenderness, discharge or bleeding. Breasts are symmetrical.       Abdominal: Soft. She exhibits no distension and no mass. There is no tenderness. There is no guarding. No hernia. Hernia confirmed negative in the right inguinal area and confirmed negative in the left inguinal area.   Genitourinary: Rectum normal, vagina normal and uterus normal. Rectal exam shows no mass. No breast swelling, tenderness, discharge or bleeding. Pelvic exam was performed with patient supine. There is no rash, tenderness, lesion or injury on the right labia. There is no rash, tenderness, lesion or injury on the left labia. Uterus is not enlarged, not fixed and not tender. Cervix exhibits no motion tenderness, no discharge and no friability. Right adnexum displays no mass, no tenderness and no fullness. Left adnexum displays no mass, no tenderness and no fullness. No erythema, tenderness or bleeding in the vagina. No foreign body in the vagina. No signs of injury around the vagina. No vaginal discharge found.   Genitourinary Comments:   BSU normal  Urethral meatus  Normal  Perineum  Normal   Musculoskeletal: Normal range of motion. She exhibits no edema or tenderness.   Lymphadenopathy:        Head (right side): No submental, no submandibular, no tonsillar, no preauricular, no posterior auricular and no occipital adenopathy present.        Head (left side): No submental, no submandibular, no tonsillar, no preauricular, no posterior auricular and no occipital adenopathy present.     She has no cervical adenopathy.        Right cervical: No  superficial cervical, no deep cervical and no posterior cervical adenopathy present.       Left cervical: No superficial cervical, no deep cervical and no posterior cervical adenopathy present.     She has no axillary adenopathy.        Right: No inguinal adenopathy present.        Left: No inguinal adenopathy present.   Neurological: She is alert and oriented to person, place, and time. Coordination normal.   Skin: Skin is warm and dry. No bruising and no rash noted. She is not diaphoretic. No erythema.   Psychiatric: She has a normal mood and affect. Her behavior is normal. Judgment and thought content normal.   Nursing note and vitals reviewed.      Assessment/Plan   Evelia was seen today for gynecologic exam.    Diagnoses and all orders for this visit:    Well woman exam with routine gynecological exam  Normal GYN exam. Will have lab work when she had her labs for Dr. Ferro.  She is given an order today.. Encouraged SBE, pt is aware how to do self breast exam and the importance of same. Discussed weight management and importance of maintaining a healthy weight. Discussed Vitamin D intake and the importance of adequate vitamin D for both Bone Health and a healthy immune system.  Discussed Daily exercise and the importance of same, in regards to a healthy heart as well as helping to maintain her weight and improving her mental health.  BMI 24.4.  Colonoscopy is up to date.  Bone density will be scheduled at [facility].  Pap smear is done per [facility]P guidelines.  -     Liquid-based Pap Smear, Screening  -     CBC & Differential  -     Comprehensive Metabolic Panel  -     Lipid Panel With LDL / HDL Ratio  -     TSH  -     T3, Uptake  -     T4, Free  -     Hemoglobin A1c  -     UA / M With / Rflx Culture([facility] ONLY) - Urine, Clean Catch    Ductal carcinoma in situ (DCIS) of left breast  Comments:  Patient has had bilateral mastectomies with reconstruction.  She is followed by Dr. Ferro.      Vitamin D deficiency  Comments:  Patient will have lab work done when she has her labs for Dr. Ferro.  She is given an order today.  Orders:  -     Vitamin D 25 Hydroxy    Encounter for screening for osteoporosis  Comments:  Patient will have bone density at [facility].  Orders:  -     DEXA Bone Density Axial; Future    High risk for colon cancer  Comments:  Patient has positive genetic screening for colon cancer.  She is up-to-date on her colonoscopy.         Patient's Body mass index is 24.37 kg/m². BMI is within normal parameters. No follow-up required..      Nano Spence, APRN  6/30/2020

## 2020-06-30 NOTE — PATIENT INSTRUCTIONS

## 2020-07-06 LAB
GEN CATEG CVX/VAG CYTO-IMP: NORMAL
HPV I/H RISK 4 DNA CVX QL PROBE+SIG AMP: NOT DETECTED
LAB AP CASE REPORT: NORMAL
LAB AP GYN OTHER FINDINGS: NORMAL
PATH INTERP SPEC-IMP: NORMAL
STAT OF ADQ CVX/VAG CYTO-IMP: NORMAL

## 2020-11-09 ENCOUNTER — TELEPHONE (OUTPATIENT)
Dept: ONCOLOGY | Facility: CLINIC | Age: 65
End: 2020-11-09

## 2020-11-09 DIAGNOSIS — D05.12 DUCTAL CARCINOMA IN SITU (DCIS) OF LEFT BREAST: Primary | ICD-10-CM

## 2020-11-09 NOTE — TELEPHONE ENCOUNTER
Patient called scheduled for f/u on 12/21, please put in Lab order she is going to go to [facility] Lab to completed Monday prior 12/14

## 2020-11-16 ENCOUNTER — TELEPHONE (OUTPATIENT)
Dept: ONCOLOGY | Facility: CLINIC | Age: 65
End: 2020-11-16

## 2020-11-16 ENCOUNTER — TELEPHONE (OUTPATIENT)
Dept: OBSTETRICS AND GYNECOLOGY | Facility: CLINIC | Age: 65
End: 2020-11-16

## 2020-11-16 DIAGNOSIS — Z20.822 EXPOSURE TO COVID-19 VIRUS: Primary | ICD-10-CM

## 2020-11-16 NOTE — TELEPHONE ENCOUNTER
Pt is having blood work done in December and is requesting to have the Covid antibody lab drawn as well. See pended order if appropriate.

## 2020-11-16 NOTE — TELEPHONE ENCOUNTER
Informed pt if she has antibody test done through [facility], it will be free. Pt also asks if she can be tested for Covid in general and if so, how. Instructed pt to call the covid testing center to set up an appt with them to do so. Voiced understanding.

## 2020-11-18 ENCOUNTER — TELEPHONE (OUTPATIENT)
Dept: ONCOLOGY | Facility: CLINIC | Age: 65
End: 2020-11-18

## 2020-11-18 NOTE — TELEPHONE ENCOUNTER
Evelia is asking if she could get an order for her to get a COVID test. She called PCP and they told her that she'd only need an order for a rapid test. However when she called the [facility] [place] [place] testing center, ph# [phone], fax# [phone], she was told she did need an order    Pt's Phone# [phone]

## 2020-11-18 NOTE — TELEPHONE ENCOUNTER
Notified pt that she can go to urgent care to get tested. Also, [facility] has a drive through if she prefers.

## 2020-11-30 ENCOUNTER — TELEPHONE (OUTPATIENT)
Dept: ONCOLOGY | Facility: CLINIC | Age: 65
End: 2020-11-30

## 2020-11-30 NOTE — TELEPHONE ENCOUNTER
Caller: MARELY STEELE    Relationship to patient: SELF    Best call back number: [phone]    PT STATES THEY WOULD LIKE TO CANCEL THEIR APPT ON 12/21. THEY DO NOT WANT TO COME OUT DURING THE CORONA PANDEMIC, STATES THEY WILL CALL TO RESCHED AFTER THE FIRST OF THE YEAR

## 2021-03-16 NOTE — TELEPHONE ENCOUNTER
Caller: MARELY STEELE    Relationship to patient: SELF    Best call back number: [phone]    PT IS ASKING IF SHE CAN RECEIVE THE COVID ANTIGEN BLOOD TEST WHEN SHE COMES IN FOR HER NEXT LABS   yes

## 2021-03-29 ENCOUNTER — TELEPHONE (OUTPATIENT)
Dept: GASTROENTEROLOGY | Facility: CLINIC | Age: 66
End: 2021-03-29

## 2021-03-29 NOTE — TELEPHONE ENCOUNTER
I have sent 2 letters to pt re: recall colon and rec'd no response. I tried to call today to discuss with pt-was unable to reach them so I will send strike 3 letter to pt. Unable to send noncompliant letter-no PCP listed in chart.

## 2021-06-20 NOTE — PROGRESS NOTES
[facility] HEMATOLOGY AND ONCOLOGY  [address]  [place] [address] [phone]    Patient Name: Evelia Kerns  Encounter Date: 06/28/2021  YOB: 1955  Patient Number: [id number]    REASON FOR VISIT: Ms. Evelia Kerns is a 65-year-old female who returns in follow-up of carcinoma of the left breast. She is seen ~ 190 months following the completion of adjuvant ACT (Adriamycin/ Cytoxan/Taxotere) chemotherapy. She completed 5 years of adjuvant Tamoxifen on 08/11/2010. 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 Alejandro. She received adjuvant Tamoxifen from 05/25/2018 through 07/13/2018 at which point she underwent bilateral nipple sparing mastectomies. She has since undergone placement of permanent breast implants bilaterally on 11/09/2018. The patient is here alone (usually with her spouse, Juvenal).     DIAGNOSTIC ABNORMALITIES: Ductal carcinoma-in-situ (DCIS)   1. Bilateral mammogram, 01/31/2018, [facility]. Comparison: 01/30/2017, 01/27/2016, 03/23/2015. Impression: Left breast calcifications for which additional imaging is recommended including CC and lateral magnification and 3D full lateral views. BI-RADS Category 0. No mammographic evidence of right breast malignancy.   2. Unilateral left diagnostic mammogram with CAD, 02/05/2018, [facility]. Comparison to screening mammogram of 01/31/2018. Impression: Indeterminate left breast calcifications for which stereotactic guided biopsy is recommended. BI-RADS Category 4.   3. Left breast core biopsies at 1 o’clock position, 03/05/2018 showing focal atypical ductal hyperplasia (0.1 cm).  4. Diagnosis: Breast, left needle localized excisional biopsy (KIS- 18,- 1450, 13 slides), 04/05/2018: Intermediate grade ductal carcinoma in situ,  "solid type; scar and radiation change; previous biopsy.  5. ER reported, 04/05/2018: On same specimen 2 blocks - #1450-5: ER (+) 29%; #1450-3: ER (-) 0%. The latter discussed and clarified with Dr. Vinny Lomeli and Dr. Parisa Nagel of pathology who confirmed that these are from the same specimen but heterogeneously expressed ER.    PREVIOUS INTERVENTIONS: Ductal carcinoma-in-situ (DCIS)   1. Left breast, excisional biopsy with needle localization, 03/30/2018 (Dr. Alejandro). FINAL DIAGNOSIS: 1. A few scattered foci of intermediate grade ductal carcinoma in situ. 2. No invasive malignancy identified. 3. Changes of previous biopsy site including fat necrosis and cicatrix formation. 4. No ductal carcinoma in situ identified at the inked margins of surgical excision. AJCC stage: pTis (DCIS) pNX - ER reported, 04/05/2018: On same specimen 2 blocks - #1450-5: ER (+) 29%; #1450-3: ER (-) 0%  2. Office encounter with Dr. Alejandro on 04/30/2018: \"After a prolonged discussion lasting 60 minutes, we felt it was most appropriate that she undergo close observation and no surgical intervention at present. Her DCIS has not been sent for ER yet. We will make sure this is sent. We will also send for Prelude DX. She will call back in 2 weeks to discuss her ER/MI positivity or not. I will see her back in 5 months with a unilateral left mammogram.\"   3. Adjuvant Tamoxifen beginning 05/25/2018 through 07/13/2018.  4. Bilateral nipple sparing mastectomies, Dr. Alejandro. Followed by reconstruction by Dr. Mark, 07/13/2018. Final Diagnoses: A) Right Breast, Skin-Sparing Mastectomy: No evidence of malignancy. Mild benign fibrocystic changes. B) Left Breast, Skin-Sparing Mastectomy: Microscopic focus of residual ductal carcinoma in situ, cribriform pattern, low nuclear grade. Greatest dimension of the residual ductal carcinoma in situ is 0.8 cm. Closest surgical margins is the posterior margin (deep margin) at 1.1 cm. No evidence of invasive " carcinoma. Pathologic AJCC Classification: ypTis.     DIAGNOSTIC ABNORMALITIES: Infiltrating ductal carcinoma left breast   1. Mammography, 03/15/2005. A new area of developing nodularity with calcifications deep in the upper outer quadrant of the left breast. Biopsy was recommended.  2. Biopsy of left breast, 04/06/2006. Infiltrating ductal carcinoma, high-grade, strongly ER (82% positive), weakly NJ (9% positive), and HER-2/analisa negative.    PREVIOUS INTERVENTIONS: Infiltrating ductal carcinoma left breast   1. Left breast lumpectomy with sentinel lymph node biopsies, 04/12/2005. A 2.2 cm tumor mass of infiltrating high-grade ductal carcinoma with a component of ductal carcinoma in situ, high-grade. 2 of 4 sentinel nodes positive.  2. Formal left axillary node dissection, 04/26/2005. Six additional nodes retrieved. All 6 negative for evidence of metastatic carcinoma.  3. Adjuvant Adriamycin, Cytoxan, and Taxotere. From 05/26/2005 through 08/04/2005. Six cycles completed.   4. Tamoxifen. From 08/11/2005 through 08/11/2010.  5. Radiation treatments to left breast, 09/12/2005 to 10/26/2005, 6040 cGy.        Problem List Items Addressed This Visit     None        Oncology/Hematology History   Ductal carcinoma in situ (DCIS) of left breast   12/15/2019 Initial Diagnosis    Ductal carcinoma in situ (DCIS) of left breast     12/15/2019 Cancer Staged    Staging form: Breast, AJCC 8th Edition  - Clinical stage from 12/15/2019: Stage 0 (cTis (DCIS), cN0, cM0, ER+, NJ+, HER2-) - Signed by Tommy Richmond MD on 12/15/2019         PAST MEDICAL HISTORY:  ALLERGIES:  Allergies   Allergen Reactions   • Adhesive Tape Rash     CURRENT MEDICATIONS:  Outpatient Encounter Medications as of 6/28/2021   Medication Sig Dispense Refill   • Calcium Carb-Cholecalciferol (CALCIUM 500/D) 500-400 MG-UNIT chewable tablet Chew.     • glucosamine-chondroitin 500-400 MG capsule capsule Take  by mouth 3 (Three) Times a Day With Meals.     •  loratadine (CLARITIN) 10 MG tablet Take 10 mg by mouth Daily.     • [DISCONTINUED] B Complex-C-E-Zn (B COMPLEX-C-E-ZINC) tablet Take 1 tablet by mouth Daily.     • [DISCONTINUED] Omega-3 Fatty Acids (OMEGA-3 FISH OIL) 1000 MG capsule Take  by mouth.     • [DISCONTINUED] Psyllium (METAMUCIL) wafer wafer Take  by mouth Daily.       No facility-administered encounter medications on file as of 6/28/2021.     ADULT ILLNESSES:   Dcis of the breast ( ICD-10:D05.12 ;Intraductal carcinoma in situ of left breast   Personal history of breast cancer ( ICD-10:Z85.3 ;Personal history of malignant neoplasm of breast   LINDA - Lorenzana's oesophagus ( ICD-10:K22.70 ;Lorenzana's esophagus without dysplasia   Depression ( ICD-10:F32.9 ;Major depressive disorder, single episode, unspecified   Hypercholesterolemia ( ICD-10:E78.00 ;Pure hypercholesterolemia, unspecified   Immunodeficiency disorder (disorder) ( ICD-10:D84.9 ;Immunodeficiency, unspecified   Osteoarthritis knee ( degenerative joint disease of knees; ICD-10:M17.0 ;Bilateral primary osteoarthritis of knee )   Syncope and collapse ( vasomotor instability; ICD-10:R55 ;Syncope and collapse )    SURGERIES:   Upper GI endoscopy, 02/17/2016   Knee surgery, repeat, 1984   Knee surgery, 1980   Dissection procedure: Formal left axillary node dissection, 04/26/2005   Breast implantation, bilateral, 11/09/2018, Dr. Mark   Colonoscopy, 2006   Endoscopy, 2011   Lumpectomy of breast, left, with sentinel node biopsy, 04/12/2005   Colonoscopy, 02/17/2016: Per Dr. Park's note: ASC lymphoid follicle, TV x2 polypoid lesions that were not polyps/hyperplastic change, diverticulosis   Cone biopsy of cervix, 11/2004   Endoscopy performed on 02/28/2019 at [facility]. - LA Grade B reflux esophagitis. - Esophageal mucosal changes secondary to established short-segment Lorenzana's disease. Biopsied. - Medium-sized hiatal hernia. - Normal stomach. - Normal examined duodenum.       ADULT  ILLNESSES:  Patient Active Problem List   Diagnosis Code   • History of Lorenzana's esophagus Z87.19   • History of esophagitis Z87.19   • History of breast cancer Z85.3   • Ductal carcinoma in situ (DCIS) of left breast D05.12   • Atypical ductal hyperplasia of left breast N60.92   • History of thoracic surgery Z98.890     SURGERIES:  Past Surgical History:   Procedure Laterality Date   • ADENOIDECTOMY     • BREAST AUGMENTATION Bilateral    • BREAST BIOPSY Left 2005   • BREAST LUMPECTOMY      2005   • COLONOSCOPY  02/17/2016    Colon polyps; Diverticulosis; Repeat 3-5 years    • COLONOSCOPY  02/26/2007    Dr. Matute-Repeat in 5 years    • ENDOSCOPY  02/17/2016    Hiatal hernia, LA Grade C and Lorenzana's   • ENDOSCOPY  10/03/2011    Hiatal hernia; Lorenzana's    • ENDOSCOPY  02/22/2010    Dr. Matute-hiatal hernia; Lorenzana's    • ENDOSCOPY  12/22/2009    Dr. Matute-Esophageal ulcers; gastritis; hiatal hernia-Repeat in 2 months    • ENDOSCOPY N/A 2/28/2019    Procedure: ESOPHAGOGASTRODUODENOSCOPY WITH ANESTHESIA;  Surgeon: Sylvia Park MD;  Location: [facility] ENDOSCOPY;  Service: Gastroenterology   • KNEE ARTHROPLASTY     • MASTECTOMY Bilateral    • TONSILLECTOMY       HEALTH MAINTENANCE ITEMS:  Health Maintenance Due   Topic Date Due   • ANNUAL PHYSICAL  Never done   • TDAP/TD VACCINES (1 - Tdap) Never done   • ZOSTER VACCINE (1 of 2) Never done   • HEPATITIS C SCREENING  Never done   • DXA SCAN  02/28/2020       <no information>  Last Completed Colonoscopy     This patient has no relevant Health Maintenance data.          There is no immunization history on file for this patient.  Last Completed Mammogram          MAMMOGRAM (Every 2 Years) Next due on 2/17/2022 02/17/2020  Done - UTD    03/08/2019  Done    02/05/2018  Mammo diagnostic digital tomosynthesis left w CAD    01/31/2018  Mammo Screening Digital Tomosynthesis Bilateral With CAD    01/30/2017  Mammo Diagnostic Digital Tomosynthesis Bilateral  "With CAD    Only the first 5 history entries have been loaded, but more history exists.                  FAMILY HISTORY:  Family History   Problem Relation Age of Onset   • Stroke Father    • Leukemia Mother    • No Known Problems Brother    • No Known Problems Sister    • No Known Problems Sister    • Breast cancer Paternal Aunt 60   • No Known Problems Daughter    • No Known Problems Son    • No Known Problems Maternal Grandmother    • No Known Problems Paternal Grandmother    • No Known Problems Maternal Aunt    • Breast cancer Other 48   • Ovarian cancer Neg Hx    • Uterine cancer Neg Hx    • Colon cancer Neg Hx    • Melanoma Neg Hx    • Prostate cancer Neg Hx    • BRCA 1/2 Neg Hx    • Endometrial cancer Neg Hx      SOCIAL HISTORY:  Social History     Socioeconomic History   • Marital status: Single     Spouse name: Not on file   • Number of children: Not on file   • Years of education: Not on file   • Highest education level: Not on file   Tobacco Use   • Smoking status: Former Smoker   • Smokeless tobacco: Never Used   Substance and Sexual Activity   • Alcohol use: Yes     Comment: Rarely   • Drug use: No   • Sexual activity: Yes       REVIEW OF SYSTEMS:  Constitutional:   The patient's appetite and energy are fairly good. \"Feel great.\" She manages all her ADLs and remains active. 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 She has regained 1 pound (had lost 2 pounds at her prior visit) since her last visit. She has no fevers, chills, or drenching night sweats. Her sleep habits are erratic.  Ear/Nose/Mouth/Throat:   She reports no ear pains, sinus symptoms, sore throat, nosebleeds, or sore tongue. She reports no headaches. She reports no hoarseness, change in voice quality.  Ocular:   She reports no eye pain, significant change in visual acuity, double vision, or blurry vision.  Respiratory:   She reports no chronic cough, significant shortness of breathing, phlegm production, or unexplained chest wall " "pain.  Cardiovascular:   She reports no exertional chest pain, chest pressure, or chest heaviness. She reports no claudication. She reports no palpitations or symptomatic orthostasis.  Gastrointestinal:   She reports no epigastric \"discomfort\".  Is off PPI and otherwise has no dysphagia, nausea, vomiting, postprandial abdominal pain, bloating, cramping, change in bowel habits, or discoloration of the stool. She reports no rectal bleeding. She reports no constipation or diarrhea. Had EGD, 02/2019 (above).  Genitourinary:   She reports no urinary burning, frequency, dribbling, or discoloration. She reports no need to urinate frequently through the night. She reports no difficulty controlling her bladder.  GYN:   She reports no unexplained vaginal bleeding and no significant vaginal discharge.  Breasts:   She reports no breast discomfort since the implant placements.   Musculoskeletal:   She has no unexplained arthralgias, myalgias, or nighttime leg cramping.  Extremities:   She reports no trouble with fluid retention or significant leg swelling.  Heme/Lymphatic:   She reports no unexplained bleeding, bruising, petechial rash, or swollen glands.  Skin:   She reports no itching, rashes, or lesions which won't heal.  Neuro:   She reports no loss of consciousness, seizures, fainting spells, or dizziness. She reports no weakness of her face, arms, or legs. She reports no difficulty with speech. She has no tremors.  Psych:   She seems generally satisfied with life. She reports no depression. She reports no mood swings.        VITAL SIGNS: /82   Pulse 79   Temp 97.3 °F (36.3 °C)   Resp 16   Ht 162.6 cm (64\")   Wt 64 kg (141 lb)   LMP 01/19/2010 (Approximate)   SpO2 98%   Breastfeeding No   BMI 24.20 kg/m² Body surface area is 1.69 meters squared.  Pain Score    06/28/21 0848   PainSc: 0-No pain         PHYSICAL EXAMINATION:   General:   She is a pleasant, cooperative, well-developed, well-nourished, and " modestly-kept middle aged female in no distress. She arrived in the exam room ambulatory. She appears to be her stated age. Her skin color is normal. ECOG PS = 0.  Head/Neck:   The patient is anicteric and atraumatic. The trachea is midline. The neck is supple without evidence of jugular venous distention or cervical adenopathy.  Eyes:   The pupils are equal, round, and reactive to light. The extraocular movements are full. There is no scleral jaundice or erythema.  Chest:   The respiratory efforts are normal and unhindered. The chest is clear to auscultation. There are no wheezes, rhonchi, rales, or asymmetry of breath sounds.  Breasts:   The right breast is notable for an implant. No axillary adenopathy. The left breast is notable for an implant. No axillary adenopathy.  Cardiovascular:   The patient has a regular cardiac rate and rhythm without murmurs, rubs, or gallops. The peripheral pulses are equal and full.  Extremities:   There is no evidence of cyanosis, clubbing, or edema.   Cutaneous:   There is no longer evidence of axillary intertrigo. She has no other overt rashes, disseminated lesions, purpura, or petechiae.  Lymphatics:   There is no evidence of adenopathy in the cervical, supraclavicular, axillary areas areas.   Neurologic:   The patient is alert, oriented, cooperative, and pleasant. She is appropriately conversant. She ambulated into the exam room without assistance and transferred from chair to exam table unaided. There is no overt dysfunction of the motor, sensory, or cerebellar systems.  Psych:   Mood and affect are appropriate for circumstance. Eye contact is appropriate.        LABS      ASSESSMENT:   1. Ductal carcinoma in situ (DCIS):   Stage: AJCC Stage: pTis (DCIS) pNX. ER, 04/05/2018: On same specimen 2 blocks - #1450-5: ER (+) 29%; #1450-3: ER (-) 0%.   Tumor [place]: Left breast, excisional biopsy with needle localization, 03/30/2018 (Dr. Alejandro). FINAL DIAGNOSIS: 1. A few scattered  "foci of intermediate grade ductal carcinoma in situ. 2. No invasive malignancy identified. 3. Changes of previous biopsy site including fat necrosis and cicatrix formation. 4. No ductal carcinoma in situ identified at the inked margins of surgical excision   Tumor Status following excisional biopsy, 03/30/2018 and bilateral nipple sparing mastectomies, 07/13/2018: ALFREDO  2. Personal history of carcinoma of the left breast, grade 3 infiltrating ductal carcinoma:   Stage: IIB (pT2 pN1 N0 G4) ER 82%, FL 9%, HER-2/analisa +2.   Baseline Node Status: 2 of 4 sentinel nodes positive.   Tumor [place]: No evidence of malignancy.   Tumor Status: Mammogram, bilateral, 01/30/2017 ([facility]) Comparison: 01/27/2016, 03/23/2015, 02/19/2013,01/21/2011. No mammographic evidence of malignancy. The patient should return for screening mammography in 12 months or sooner, if clinically indicated.   Prognosis: Good.  3. Vasomotor instability. Resolved off Tamoxifen.   4. Intermittent (mild) leukopenia. Observation warranted. Normal since 06/24/2019.   5. Hypercholesterolemia.   6. 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   7. History of depression.   8. Self-directed. Had previously declined scheduled repeat EGD per Dr. Park.  Has been missing follow-up appointments.  a. Letter from Dr. Park (patient missed EGD). On 11/20/2014, we called the patient to let her know that we received a letter from GI (Dr. Park) noting patient declined repeat EGD Had previously explained to the patient of the letter advising repeat EGD for Lorenzana's esophagus. Had strongly encouraged the patient to follow through. She had stated \"I don't think it is necessary and I have already called Dr. Park's office telling them so and I don't see the need in this.\"   b. Discussed again at her visit on 02/04/2015. She replies that \"I'm doing fine and I don't want to be pressured into this. I'll discuss this with somebody else before deciding.\"   c. Discussed " "again at her visit last 08/05/2015. She said, \"I don't want to be probed or prodded unless I need to be.\"   d. Upper GI endoscopy, 02/17/2016 ([facility]), - Small hiatus hernia - LA Grade C reflux esophagitis. Esophageal mucosal changes secondary to established short-segment Lorenzana's disease. Biopsied. Normal stomach. Normal examined duodenum.   e. Endoscopy performed on 02/28/2019 at [facility]. - LA Grade B reflux esophagitis. - Esophageal mucosal changes secondary to established short-segment Lorenzana's disease. Biopsied. - Medium-sized hiatal hernia. - Normal stomach. - Normal examined duodenum.    PLAN:  1.  Apprised of labs from 06/21/2021 with normal CBC, normal CMP, normalized (2.81; from 3.27; from 2.01; from 1.98; from 2.14) CEA, normal CA 27-29.   2.  Previously discussed that on biopsy of 03/30/2018, ER reported, 04/05/2018: On same specimen 2 blocks - #1450-5: ER (+) 29%; #1450-3: ER (-) 0%. The latter discussed and clarified with Dr. Vinny Lomeli and Dr. Parisa Nagel of pathology who confirmed that these are from the same specimen but heterogeneously expressed ER.   3.  Diagnostic information previously discussed. Previously reviewed imaging studies (bilateral mammogram, 01/31/2018 and unilateral left diagnostic mammogram, 02/05/2018), path from left breast core biopsies at 1 o’clock position, 03/05/3018 showing focal atypical ductal hyperplasia (0.1 cm), then needle localization and excisional biopsy of left breast mass, 03/30/2018 and office encounters from Dr. Alejandro, most recently on 04/30/2018. \"After a prolonged discussion lasting 60 minutes, we felt it was most appropriate that she undergo close observation and no surgical intervention at present. Her DCIS has not been sent for ER yet. We will make sure this is sent. We will also send for Prelude DX. She will call back in 2 weeks to discuss her ER/SC positivity or not. I will see her back in 5 months with a unilateral left " "mammogram.\" I discussed the options (see #5 following). Underwent bilateral mastectomies on 07/13/2018 (above).   4.  Ductal carcinoma in situ (DCIS) previously discussed. I had noted that many, but not all cases of DCIS progress to invasive carcinoma within a woman's lifetime. The available therapies were reviewed:  a) Mastectomy is curative 98-99% of the time (Given the information available, I feel that this is the procedure the patient will benefit the most from, and the procedure she favors anyway).   b) Localized DCIS may be treated with breast sparing surgery and irradiation (recurrence rate = 9-17%).   c) Excision alone may be considered in those with less than 1-2 cm low grade lesions.   d) Tamoxifen should be considered to reduce (from 13% to 8%) the risk of ipsilateral breast recurrence after breast sparing surgery, and to reduce the risk of contralateral breast cancer in all women.   5.  Previously acknowledged her self-directed approach.   6.  Continue currently identified medications.   7.  Continue management per primary care and other specialists.   8.  Return to office in 12 months with pre-office CBC with differential, CEA, CA27-29, and CMP.     MEDICAL DECISION MAKING: Low Complexity   AMOUNT OF DATA: Limited    I spent 20 total minutes, face-to-face, caring for Evelia today.  Greater than 50% of this time involved counseling and/or coordination of care as documented within this note regarding the patient's illness(es), pros and cons of various treatment options, instructions and/or risk reduction.     cc: MD William Cummings MD Blair Tolar, MD Pamela Reed, MD         "

## 2021-06-21 ENCOUNTER — LAB (OUTPATIENT)
Dept: LAB | Facility: HOSPITAL | Age: 66
End: 2021-06-21

## 2021-06-21 ENCOUNTER — APPOINTMENT (OUTPATIENT)
Dept: LAB | Facility: HOSPITAL | Age: 66
End: 2021-06-21

## 2021-06-21 DIAGNOSIS — Z85.3 HISTORY OF BREAST CANCER: Primary | ICD-10-CM

## 2021-06-21 DIAGNOSIS — Z85.3 HISTORY OF BREAST CANCER: ICD-10-CM

## 2021-06-21 LAB
ALBUMIN SERPL-MCNC: 4.2 G/DL (ref 3.5–5.2)
ALBUMIN/GLOB SERPL: 1.9 G/DL
ALP SERPL-CCNC: 95 U/L (ref 39–117)
ALT SERPL W P-5'-P-CCNC: 11 U/L (ref 1–33)
ANION GAP SERPL CALCULATED.3IONS-SCNC: 10 MMOL/L (ref 5–15)
AST SERPL-CCNC: 18 U/L (ref 1–32)
BASOPHILS # BLD AUTO: 0.06 10*3/MM3 (ref 0–0.2)
BASOPHILS NFR BLD AUTO: 1 % (ref 0–1.5)
BILIRUB SERPL-MCNC: 0.3 MG/DL (ref 0–1.2)
BUN SERPL-MCNC: 13 MG/DL (ref 8–23)
BUN/CREAT SERPL: 22.8 (ref 7–25)
CALCIUM SPEC-SCNC: 9.4 MG/DL (ref 8.6–10.5)
CHLORIDE SERPL-SCNC: 105 MMOL/L (ref 98–107)
CO2 SERPL-SCNC: 24 MMOL/L (ref 22–29)
CREAT SERPL-MCNC: 0.57 MG/DL (ref 0.57–1)
DEPRECATED RDW RBC AUTO: 41.5 FL (ref 37–54)
EOSINOPHIL # BLD AUTO: 0.08 10*3/MM3 (ref 0–0.4)
EOSINOPHIL NFR BLD AUTO: 1.4 % (ref 0.3–6.2)
ERYTHROCYTE [DISTWIDTH] IN BLOOD BY AUTOMATED COUNT: 12.7 % (ref 12.3–15.4)
GFR SERPL CREATININE-BSD FRML MDRD: 107 ML/MIN/1.73
GLOBULIN UR ELPH-MCNC: 2.2 GM/DL
GLUCOSE SERPL-MCNC: 97 MG/DL (ref 65–99)
HCT VFR BLD AUTO: 39.7 % (ref 34–46.6)
HGB BLD-MCNC: 13.4 G/DL (ref 12–15.9)
IMM GRANULOCYTES # BLD AUTO: 0.02 10*3/MM3 (ref 0–0.05)
IMM GRANULOCYTES NFR BLD AUTO: 0.3 % (ref 0–0.5)
LYMPHOCYTES # BLD AUTO: 1.6 10*3/MM3 (ref 0.7–3.1)
LYMPHOCYTES NFR BLD AUTO: 27.3 % (ref 19.6–45.3)
MCH RBC QN AUTO: 30 PG (ref 26.6–33)
MCHC RBC AUTO-ENTMCNC: 33.8 G/DL (ref 31.5–35.7)
MCV RBC AUTO: 88.8 FL (ref 79–97)
MONOCYTES # BLD AUTO: 0.51 10*3/MM3 (ref 0.1–0.9)
MONOCYTES NFR BLD AUTO: 8.7 % (ref 5–12)
NEUTROPHILS NFR BLD AUTO: 3.59 10*3/MM3 (ref 1.7–7)
NEUTROPHILS NFR BLD AUTO: 61.3 % (ref 42.7–76)
NRBC BLD AUTO-RTO: 0 /100 WBC (ref 0–0.2)
PLATELET # BLD AUTO: 221 10*3/MM3 (ref 140–450)
PMV BLD AUTO: 12.3 FL (ref 6–12)
POTASSIUM SERPL-SCNC: 4.3 MMOL/L (ref 3.5–5.2)
PROT SERPL-MCNC: 6.4 G/DL (ref 6–8.5)
RBC # BLD AUTO: 4.47 10*6/MM3 (ref 3.77–5.28)
SODIUM SERPL-SCNC: 139 MMOL/L (ref 136–145)
WBC # BLD AUTO: 5.86 10*3/MM3 (ref 3.4–10.8)

## 2021-06-21 PROCEDURE — 82378 CARCINOEMBRYONIC ANTIGEN: CPT

## 2021-06-21 PROCEDURE — 80053 COMPREHEN METABOLIC PANEL: CPT

## 2021-06-21 PROCEDURE — 85025 COMPLETE CBC W/AUTO DIFF WBC: CPT

## 2021-06-21 PROCEDURE — 36415 COLL VENOUS BLD VENIPUNCTURE: CPT

## 2021-06-21 PROCEDURE — 86300 IMMUNOASSAY TUMOR CA 15-3: CPT

## 2021-06-22 LAB
CANCER AG27-29 SERPL-ACNC: 14 U/ML (ref 0–38.6)
CEA SERPL-MCNC: 2.81 NG/ML

## 2021-06-28 ENCOUNTER — OFFICE VISIT (OUTPATIENT)
Dept: ONCOLOGY | Facility: CLINIC | Age: 66
End: 2021-06-28

## 2021-06-28 VITALS
HEIGHT: 64 IN | RESPIRATION RATE: 16 BRPM | TEMPERATURE: 97.3 F | HEART RATE: 79 BPM | SYSTOLIC BLOOD PRESSURE: 124 MMHG | WEIGHT: 141 LBS | DIASTOLIC BLOOD PRESSURE: 82 MMHG | BODY MASS INDEX: 24.07 KG/M2 | OXYGEN SATURATION: 98 %

## 2021-06-28 DIAGNOSIS — D05.12 DUCTAL CARCINOMA IN SITU (DCIS) OF LEFT BREAST: Primary | ICD-10-CM

## 2021-06-28 PROCEDURE — 99213 OFFICE O/P EST LOW 20 MIN: CPT | Performed by: INTERNAL MEDICINE

## 2021-06-28 RX ORDER — SODIUM PHOSPHATE,MONO-DIBASIC 19G-7G/118
ENEMA (ML) RECTAL
COMMUNITY

## 2021-06-28 RX ORDER — LORATADINE 10 MG/1
10 TABLET ORAL DAILY
COMMUNITY

## 2021-07-01 ENCOUNTER — OFFICE VISIT (OUTPATIENT)
Dept: OBSTETRICS AND GYNECOLOGY | Facility: CLINIC | Age: 66
End: 2021-07-01

## 2021-07-01 VITALS
DIASTOLIC BLOOD PRESSURE: 86 MMHG | HEIGHT: 64 IN | WEIGHT: 140 LBS | BODY MASS INDEX: 23.9 KG/M2 | SYSTOLIC BLOOD PRESSURE: 126 MMHG

## 2021-07-01 DIAGNOSIS — D05.12 DUCTAL CARCINOMA IN SITU (DCIS) OF LEFT BREAST: ICD-10-CM

## 2021-07-01 DIAGNOSIS — Z15.09 CHEK2-RELATED BREAST CANCER (HCC): ICD-10-CM

## 2021-07-01 DIAGNOSIS — C50.919 CHEK2-RELATED BREAST CANCER (HCC): ICD-10-CM

## 2021-07-01 DIAGNOSIS — Z15.02 CHEK2-RELATED BREAST CANCER (HCC): ICD-10-CM

## 2021-07-01 DIAGNOSIS — E55.9 VITAMIN D DEFICIENCY: ICD-10-CM

## 2021-07-01 DIAGNOSIS — Z15.89 CHEK2-RELATED BREAST CANCER (HCC): ICD-10-CM

## 2021-07-01 DIAGNOSIS — Z13.820 ENCOUNTER FOR SCREENING FOR OSTEOPOROSIS: ICD-10-CM

## 2021-07-01 DIAGNOSIS — Z01.419 WELL WOMAN EXAM WITH ROUTINE GYNECOLOGICAL EXAM: Primary | ICD-10-CM

## 2021-07-01 PROCEDURE — G0123 SCREEN CERV/VAG THIN LAYER: HCPCS | Performed by: NURSE PRACTITIONER

## 2021-07-01 PROCEDURE — 99396 PREV VISIT EST AGE 40-64: CPT | Performed by: NURSE PRACTITIONER

## 2021-07-01 PROCEDURE — 87624 HPV HI-RISK TYP POOLED RSLT: CPT | Performed by: NURSE PRACTITIONER

## 2021-07-01 NOTE — PROGRESS NOTES
"Shirley Kerns is a 65 y.o. female    Patient is here today for yearly checkup.  Complains of vaginal dryness.    Gynecologic Exam  The patient's pertinent negatives include no pelvic pain, vaginal bleeding or vaginal discharge. The patient is experiencing no pain. Pertinent negatives include no abdominal pain, anorexia, back pain, chills, constipation, diarrhea, discolored urine, dysuria, fever, flank pain, frequency, headaches, hematuria, joint pain, joint swelling, nausea, painful intercourse, rash, sore throat, urgency or vomiting. She is sexually active. She is postmenopausal.         /86   Ht 162.6 cm (64.02\")   Wt 63.5 kg (140 lb)   LMP 01/19/2010 (Approximate)   BMI 24.02 kg/m²     Outpatient Encounter Medications as of 7/1/2021   Medication Sig Dispense Refill   • Calcium Carb-Cholecalciferol (CALCIUM 500/D) 500-400 MG-UNIT chewable tablet Chew.     • glucosamine-chondroitin 500-400 MG capsule capsule Take  by mouth 3 (Three) Times a Day With Meals.     • loratadine (CLARITIN) 10 MG tablet Take 10 mg by mouth Daily.       No facility-administered encounter medications on file as of 7/1/2021.       Surgical History  Past Surgical History:   Procedure Laterality Date   • ADENOIDECTOMY     • BREAST AUGMENTATION Bilateral    • BREAST BIOPSY Left 2005   • BREAST LUMPECTOMY      2005   • COLONOSCOPY  02/17/2016    Colon polyps; Diverticulosis; Repeat 3-5 years    • COLONOSCOPY  02/26/2007    Dr. Matute-Repeat in 5 years    • ENDOSCOPY  02/17/2016    Hiatal hernia, LA Grade C and Lorenzana's   • ENDOSCOPY  10/03/2011    Hiatal hernia; Lorenzana's    • ENDOSCOPY  02/22/2010    Dr. Matute-hiatal hernia; Lorenzana's    • ENDOSCOPY  12/22/2009    Dr. Matute-Esophageal ulcers; gastritis; hiatal hernia-Repeat in 2 months    • ENDOSCOPY N/A 2/28/2019    Procedure: ESOPHAGOGASTRODUODENOSCOPY WITH ANESTHESIA;  Surgeon: Sylvia Park MD;  Location: [facility] ENDOSCOPY;  Service: " Gastroenterology   • KNEE ARTHROPLASTY     • MASTECTOMY Bilateral    • TONSILLECTOMY         Family History  Family History   Problem Relation Age of Onset   • Stroke Father    • Leukemia Mother    • No Known Problems Brother    • No Known Problems Sister    • No Known Problems Sister    • Breast cancer Paternal Aunt 60   • No Known Problems Daughter    • No Known Problems Son    • No Known Problems Maternal Grandmother    • No Known Problems Paternal Grandmother    • No Known Problems Maternal Aunt    • Breast cancer Other 48   • Ovarian cancer Neg Hx    • Uterine cancer Neg Hx    • Colon cancer Neg Hx    • Melanoma Neg Hx    • Prostate cancer Neg Hx    • BRCA 1/2 Neg Hx    • Endometrial cancer Neg Hx        The following portions of the patient's history were reviewed and updated as appropriate: allergies, current medications, past family history, past medical history, past social history, past surgical history and problem list.    Review of Systems   Constitutional: Negative for activity change, appetite change, chills, diaphoresis, fatigue, fever, unexpected weight gain and unexpected weight loss.   HENT: Negative for congestion, dental problem, drooling, ear discharge, ear pain, facial swelling, hearing loss, mouth sores, nosebleeds, postnasal drip, rhinorrhea, sinus pressure, sneezing, sore throat, swollen glands, tinnitus, trouble swallowing and voice change.    Eyes: Negative for blurred vision, double vision, photophobia, pain, discharge, redness, itching and visual disturbance.   Respiratory: Negative for apnea, cough, choking, chest tightness, shortness of breath, wheezing and stridor.    Cardiovascular: Negative for chest pain, palpitations and leg swelling.   Gastrointestinal: Negative for abdominal distention, abdominal pain, anal bleeding, anorexia, blood in stool, constipation, diarrhea, nausea, rectal pain, vomiting, GERD and indigestion.   Endocrine: Negative for cold intolerance, heat intolerance,  polydipsia, polyphagia and polyuria.   Genitourinary: Negative for amenorrhea, breast discharge, breast lump, breast pain, decreased libido, decreased urine volume, difficulty urinating, dyspareunia, dysuria, flank pain, frequency, genital sores, hematuria, menstrual problem, pelvic pain, pelvic pressure, urgency, urinary incontinence, vaginal bleeding, vaginal discharge and vaginal pain.   Musculoskeletal: Negative for arthralgias, back pain, gait problem, joint pain, joint swelling, myalgias, neck pain, neck stiffness and bursitis.   Skin: Negative for color change, dry skin and rash.   Allergic/Immunologic: Negative for environmental allergies, food allergies and immunocompromised state.   Neurological: Negative for dizziness, tremors, seizures, syncope, facial asymmetry, speech difficulty, weakness, light-headedness, numbness, headache, memory problem and confusion.   Hematological: Negative for adenopathy. Does not bruise/bleed easily.   Psychiatric/Behavioral: Negative for agitation, behavioral problems, decreased concentration, dysphoric mood, hallucinations, self-injury, sleep disturbance, suicidal ideas, negative for hyperactivity, depressed mood and stress. The patient is not nervous/anxious.        Objective   Physical Exam  Vitals and nursing note reviewed. Exam conducted with a chaperone present.   Constitutional:       General: She is not in acute distress.     Appearance: She is well-developed. She is not diaphoretic.   HENT:      Head: Normocephalic.      Right Ear: External ear normal.      Left Ear: External ear normal.      Nose: Nose normal.   Eyes:      General: No scleral icterus.        Right eye: No discharge.         Left eye: No discharge.      Conjunctiva/sclera: Conjunctivae normal.      Pupils: Pupils are equal, round, and reactive to light.   Neck:      Thyroid: No thyromegaly.      Vascular: No carotid bruit.      Trachea: No tracheal deviation.   Cardiovascular:      Rate and Rhythm:  Normal rate and regular rhythm.      Heart sounds: Normal heart sounds. No murmur heard.     Pulmonary:      Effort: Pulmonary effort is normal. No respiratory distress.      Breath sounds: Normal breath sounds. No wheezing.   Chest:      Breasts: Breasts are symmetrical.         Right: Normal. No swelling, bleeding, mass, skin change or tenderness.         Left: Normal. No swelling, bleeding, mass, skin change or tenderness.       Abdominal:      General: There is no distension.      Palpations: Abdomen is soft. There is no mass.      Tenderness: There is no abdominal tenderness. There is no right CVA tenderness, left CVA tenderness or guarding.      Hernia: No hernia is present. There is no hernia in the left inguinal area or right inguinal area.   Genitourinary:     General: Normal vulva.      Exam position: Lithotomy position.      Labia:         Right: No rash, tenderness, lesion or injury.         Left: No rash, tenderness, lesion or injury.       Vagina: Normal. No signs of injury and foreign body. No vaginal discharge, erythema, tenderness or bleeding.      Cervix: Normal.      Uterus: Normal. Not enlarged, not fixed and not tender.       Adnexa: Right adnexa normal and left adnexa normal.        Right: No mass, tenderness or fullness.          Left: No mass, tenderness or fullness.        Rectum: Normal. No mass.      Comments: Vaginal vault is atrophic  BSU normal  Urethral meatus  Normal  Perineum  Normal  Musculoskeletal:         General: No tenderness. Normal range of motion.      Cervical back: Normal range of motion and neck supple.   Lymphadenopathy:      Head:      Right side of head: No submental, submandibular, tonsillar, preauricular, posterior auricular or occipital adenopathy.      Left side of head: No submental, submandibular, tonsillar, preauricular, posterior auricular or occipital adenopathy.      Cervical: No cervical adenopathy.      Right cervical: No superficial, deep or posterior  cervical adenopathy.     Left cervical: No superficial, deep or posterior cervical adenopathy.      Upper Body:      Right upper body: No supraclavicular, axillary or pectoral adenopathy.      Left upper body: No supraclavicular, axillary or pectoral adenopathy.      Lower Body: No right inguinal adenopathy. No left inguinal adenopathy.   Skin:     General: Skin is warm and dry.      Findings: No bruising, erythema or rash.   Neurological:      Mental Status: She is alert and oriented to person, place, and time.      Coordination: Coordination normal.   Psychiatric:         Mood and Affect: Mood normal.         Behavior: Behavior normal.         Thought Content: Thought content normal.         Judgment: Judgment normal.         Assessment/Plan   Diagnoses and all orders for this visit:    1. Well woman exam with routine gynecological exam (Primary)  Normal GYN exam. Will have lab work today. Encouraged SBE, pt is aware how to do self breast exam and the importance of same. Discussed weight management and importance of maintaining a healthy weight. Discussed Vitamin D intake and the importance of adequate vitamin D for both Bone Health and a healthy immune system.  Discussed Daily exercise and the importance of same, in regards to a healthy heart as well as helping to maintain her weight and improving her mental health.  BMI 24.  Colonoscopy was last done in 2016.  She declines to schedule one this year..  Bone density will be scheduled at [facility].  Pap smear is done per ASCCP guidelines.  -     Lipid Panel With LDL / HDL Ratio  -     TSH  -     T3, Uptake  -     T4, Free  -     Hemoglobin A1c  -     Urine Culture - , Urine, Clean Catch  -     UA / M With / Rflx Culture([facility] ONLY) - Urine, Clean Catch  -     Hepatitis C Antibody  -     Liquid-based Pap Smear, Screening  -     Cologuard - Stool, Per Rectum; Future    2. Ductal carcinoma in situ (DCIS) of left breast  Comments:  Patient is followed by  Dr. Ferro.  She has had bilateral mastectomies with reconstruction.    3. Encounter for screening for osteoporosis  Comments:  Will have a bone density at [facility].  Orders:  -     DEXA Bone Density Axial; Future    4. Vitamin D deficiency  Comments:  Patient will have lab work drawn today.  Orders:  -     Vitamin D 25 Hydroxy    5. CHEK2-related breast cancer (CMS/[facility])  Comments:  Patient's myRisk was positive for CHEK2.  She is counseled regarding her need for screening colonoscopies on a regular basis.  She declines scheduling that at this time.  She is agreeable to do a Cologuard.  We have sent the order for them to mail that to her.         Patient's Body mass index is 24.02 kg/m². indicating that she is within normal range (BMI 18.5-24.9). No BMI management plan needed..      Nano Spence, APRN  7/1/2021

## 2021-07-01 NOTE — PATIENT INSTRUCTIONS
"BMI for Adults  What is BMI?  Body mass index (BMI) is a number that is calculated from a person's weight and height. BMI can help estimate how much of a person's weight is composed of fat. BMI does not measure body fat directly. Rather, it is an alternative to procedures that directly measure body fat, which can be difficult and expensive.  BMI can help identify people who may be at higher risk for certain medical problems.  What are BMI measurements used for?  BMI is used as a screening tool to identify possible weight problems. It helps determine whether a person is obese, overweight, a healthy weight, or underweight.  BMI is useful for:  · Identifying a weight problem that may be related to a medical condition or may increase the risk for medical problems.  · Promoting changes, such as changes in diet and exercise, to help reach a healthy weight. BMI screening can be repeated to see if these changes are working.  How is BMI calculated?  BMI involves measuring your weight in relation to your height. Both height and weight are measured, and the BMI is calculated from those numbers. This can be done either in English ([place] or metric measurements. Note that charts and online BMI calculators are available to help you find your BMI quickly and easily without having to do these calculations yourself.  To calculate your BMI in English ([place] measurements:    1. Measure your weight in pounds (lb).  2. Multiply the number of pounds by 703.  ? For example, for a person who weighs 180 lb, multiply that number by 703, which equals 126,540.  3. Measure your height in inches. Then multiply that number by itself to get a measurement called \"inches squared.\"  ? For example, for a person who is 70 inches tall, the \"inches squared\" measurement is 70 inches x 70 inches, which equals 4,900 inches squared.  4. Divide the total from step 2 (number of lb x 703) by the total from step 3 (inches squared): 126,540 ÷ 4,900 = 25.8. This is " "your BMI.  To calculate your BMI in metric measurements:  1. Measure your weight in kilograms (kg).  2. Measure your height in meters (m). Then multiply that number by itself to get a measurement called \"meters squared.\"  ? For example, for a person who is 1.75 m tall, the \"meters squared\" measurement is 1.75 m x 1.75 m, which is equal to 3.1 meters squared.  3. Divide the number of kilograms (your weight) by the meters squared number. In this example: 70 ÷ 3.1 = 22.6. This is your BMI.  What do the results mean?  BMI charts are used to identify whether you are underweight, normal weight, overweight, or obese. The following guidelines will be used:  · Underweight: BMI less than 18.5.  · Normal weight: BMI between 18.5 and 24.9.  · Overweight: BMI between 25 and 29.9.  · Obese: BMI of 30 or above.  Keep these notes in mind:  · Weight includes both fat and muscle, so someone with a muscular build, such as an athlete, may have a BMI that is higher than 24.9. In cases like these, BMI is not an accurate measure of body fat.  · To determine if excess body fat is the cause of a BMI of 25 or higher, further assessments may need to be done by a health care provider.  · BMI is usually interpreted in the same way for men and women.  Where to find more information  For more information about BMI, including tools to quickly calculate your BMI, go to these websites:  · Centers for Disease Control and Prevention: www.cdc.gov  · American Heart Association: www.heart.org  · National Heart, Lung, and Blood [place]: www.nhlbi.nih.gov  Summary  · Body mass index (BMI) is a number that is calculated from a person's weight and height.  · BMI may help estimate how much of a person's weight is composed of fat. BMI can help identify those who may be at higher risk for certain medical problems.  · BMI can be measured using English measurements or metric measurements.  · BMI charts are used to identify whether you are underweight, normal " weight, overweight, or obese.  This information is not intended to replace advice given to you by your health care provider. Make sure you discuss any questions you have with your health care provider.  Document Revised: 09/09/2020 Document Reviewed: 07/17/2020  Elsevier Patient Education © 2021 [employer]

## 2021-07-03 LAB
25(OH)D3+25(OH)D2 SERPL-MCNC: 33.6 NG/ML (ref 30–100)
APPEARANCE UR: CLEAR
BACTERIA #/AREA URNS HPF: NORMAL /HPF
BACTERIA UR CULT: NORMAL
BACTERIA UR CULT: NORMAL
BILIRUB UR QL STRIP: NEGATIVE
CASTS URNS QL MICRO: NORMAL /LPF
CHOLEST SERPL-MCNC: 250 MG/DL (ref 0–200)
COLOR UR: YELLOW
EPI CELLS #/AREA URNS HPF: NORMAL /HPF (ref 0–10)
GLUCOSE UR QL: NEGATIVE
HBA1C MFR BLD: 5.8 % (ref 4.8–5.6)
HCV AB S/CO SERPL IA: <0.1 S/CO RATIO (ref 0–0.9)
HDLC SERPL-MCNC: 57 MG/DL (ref 40–60)
HGB UR QL STRIP: NEGATIVE
KETONES UR QL STRIP: NEGATIVE
LDLC SERPL CALC-MCNC: 171 MG/DL (ref 0–100)
LDLC/HDLC SERPL: 2.95 {RATIO}
LEUKOCYTE ESTERASE UR QL STRIP: NEGATIVE
MICRO URNS: NORMAL
MICRO URNS: NORMAL
NITRITE UR QL STRIP: NEGATIVE
PH UR STRIP: 6.5 [PH] (ref 5–7.5)
PROT UR QL STRIP: NEGATIVE
RBC #/AREA URNS HPF: NORMAL /HPF (ref 0–2)
SP GR UR: 1 (ref 1–1.03)
T3RU NFR SERPL: 23 % (ref 24–39)
T4 FREE SERPL-MCNC: 1.07 NG/DL (ref 0.93–1.7)
TRIGL SERPL-MCNC: 125 MG/DL (ref 0–150)
TSH SERPL DL<=0.005 MIU/L-ACNC: 2.39 UIU/ML (ref 0.27–4.2)
URINALYSIS REFLEX: NORMAL
UROBILINOGEN UR STRIP-MCNC: 0.2 MG/DL (ref 0.2–1)
VLDLC SERPL CALC-MCNC: 22 MG/DL (ref 5–40)
WBC #/AREA URNS HPF: NORMAL /HPF (ref 0–5)

## 2021-07-07 LAB
GEN CATEG CVX/VAG CYTO-IMP: NORMAL
HPV I/H RISK 4 DNA CVX QL PROBE+SIG AMP: NOT DETECTED
LAB AP CASE REPORT: NORMAL
LAB AP GYN ADDITIONAL INFORMATION: NORMAL
LAB AP GYN OTHER FINDINGS: NORMAL
PATH INTERP SPEC-IMP: NORMAL
STAT OF ADQ CVX/VAG CYTO-IMP: NORMAL

## 2021-07-14 ENCOUNTER — HOSPITAL ENCOUNTER (OUTPATIENT)
Dept: BONE DENSITY | Facility: HOSPITAL | Age: 66
Discharge: HOME | End: 2021-07-14
Admitting: NURSE PRACTITIONER

## 2021-07-14 DIAGNOSIS — Z13.820 ENCOUNTER FOR SCREENING FOR OSTEOPOROSIS: ICD-10-CM

## 2021-07-14 PROCEDURE — 77080 DXA BONE DENSITY AXIAL: CPT

## 2022-01-26 ENCOUNTER — TELEPHONE (OUTPATIENT)
Dept: GASTROENTEROLOGY | Facility: CLINIC | Age: 67
End: 2022-01-26

## 2022-01-26 NOTE — TELEPHONE ENCOUNTER
Pt left a vm stating she received her endo recall letter in the mail but she is not ready to have it done at this time because she is not having any problems.

## 2022-01-26 NOTE — TELEPHONE ENCOUNTER
Tried to call pt to discuss as she has Lorenzana's-was unable to reach her so I left VM asking her to call me back to discuss further.

## 2022-01-27 NOTE — TELEPHONE ENCOUNTER
Tried to call pt again today to discuss endo recall-was unable to reach her so I left another VM asking her to call me back to discuss.

## 2022-06-21 ENCOUNTER — LAB (OUTPATIENT)
Dept: LAB | Facility: HOSPITAL | Age: 67
End: 2022-06-21

## 2022-06-21 DIAGNOSIS — D05.12 DUCTAL CARCINOMA IN SITU (DCIS) OF LEFT BREAST: ICD-10-CM

## 2022-06-21 LAB
ALBUMIN SERPL-MCNC: 4.2 G/DL (ref 3.5–5.2)
ALBUMIN/GLOB SERPL: 1.8 G/DL
ALP SERPL-CCNC: 96 U/L (ref 39–117)
ALT SERPL W P-5'-P-CCNC: 10 U/L (ref 1–33)
ANION GAP SERPL CALCULATED.3IONS-SCNC: 6 MMOL/L (ref 5–15)
AST SERPL-CCNC: 16 U/L (ref 1–32)
BASOPHILS # BLD AUTO: 0.07 10*3/MM3 (ref 0–0.2)
BASOPHILS NFR BLD AUTO: 1.5 % (ref 0–1.5)
BILIRUB SERPL-MCNC: 0.5 MG/DL (ref 0–1.2)
BUN SERPL-MCNC: 10 MG/DL (ref 8–23)
BUN/CREAT SERPL: 13.2 (ref 7–25)
CALCIUM SPEC-SCNC: 9.5 MG/DL (ref 8.6–10.5)
CEA SERPL-MCNC: 2.65 NG/ML
CHLORIDE SERPL-SCNC: 103 MMOL/L (ref 98–107)
CO2 SERPL-SCNC: 28 MMOL/L (ref 22–29)
CREAT SERPL-MCNC: 0.76 MG/DL (ref 0.57–1)
DEPRECATED RDW RBC AUTO: 42.9 FL (ref 37–54)
EGFRCR SERPLBLD CKD-EPI 2021: 87.1 ML/MIN/1.73
EOSINOPHIL # BLD AUTO: 0.1 10*3/MM3 (ref 0–0.4)
EOSINOPHIL NFR BLD AUTO: 2.1 % (ref 0.3–6.2)
ERYTHROCYTE [DISTWIDTH] IN BLOOD BY AUTOMATED COUNT: 12.8 % (ref 12.3–15.4)
GLOBULIN UR ELPH-MCNC: 2.3 GM/DL
GLUCOSE SERPL-MCNC: 115 MG/DL (ref 65–99)
HCT VFR BLD AUTO: 39.3 % (ref 34–46.6)
HGB BLD-MCNC: 12.4 G/DL (ref 12–15.9)
IMM GRANULOCYTES # BLD AUTO: 0.01 10*3/MM3 (ref 0–0.05)
IMM GRANULOCYTES NFR BLD AUTO: 0.2 % (ref 0–0.5)
LYMPHOCYTES # BLD AUTO: 1.17 10*3/MM3 (ref 0.7–3.1)
LYMPHOCYTES NFR BLD AUTO: 24.6 % (ref 19.6–45.3)
MCH RBC QN AUTO: 29 PG (ref 26.6–33)
MCHC RBC AUTO-ENTMCNC: 31.6 G/DL (ref 31.5–35.7)
MCV RBC AUTO: 91.8 FL (ref 79–97)
MONOCYTES # BLD AUTO: 0.49 10*3/MM3 (ref 0.1–0.9)
MONOCYTES NFR BLD AUTO: 10.3 % (ref 5–12)
NEUTROPHILS NFR BLD AUTO: 2.92 10*3/MM3 (ref 1.7–7)
NEUTROPHILS NFR BLD AUTO: 61.3 % (ref 42.7–76)
NRBC BLD AUTO-RTO: 0 /100 WBC (ref 0–0.2)
PLATELET # BLD AUTO: 243 10*3/MM3 (ref 140–450)
PMV BLD AUTO: 11.3 FL (ref 6–12)
POTASSIUM SERPL-SCNC: 3.9 MMOL/L (ref 3.5–5.2)
PROT SERPL-MCNC: 6.5 G/DL (ref 6–8.5)
RBC # BLD AUTO: 4.28 10*6/MM3 (ref 3.77–5.28)
SODIUM SERPL-SCNC: 137 MMOL/L (ref 136–145)
WBC NRBC COR # BLD: 4.76 10*3/MM3 (ref 3.4–10.8)

## 2022-06-21 PROCEDURE — 36415 COLL VENOUS BLD VENIPUNCTURE: CPT

## 2022-06-21 PROCEDURE — 85025 COMPLETE CBC W/AUTO DIFF WBC: CPT

## 2022-06-21 PROCEDURE — 86300 IMMUNOASSAY TUMOR CA 15-3: CPT

## 2022-06-21 PROCEDURE — 80053 COMPREHEN METABOLIC PANEL: CPT

## 2022-06-21 PROCEDURE — 82378 CARCINOEMBRYONIC ANTIGEN: CPT

## 2022-06-21 NOTE — PROGRESS NOTES
[facility] ON[facility] GROUP HEMATOLOGY AND ONCOLOGY  [address]  [place] [address] [phone]    Patient Name: Evelia Kerns  Encounter Date: 06/28/2022  YOB: 1955  Patient Number: [id number]     REASON FOR VISIT: Ms. Evelia Kerns is a 66-year-old female who returns in follow-up of carcinoma of the left breast. She is seen ~ 202 months following the completion of adjuvant ACT (Adriamycin/ Cytoxan/Taxotere) chemotherapy. She completed 5 years of adjuvant Tamoxifen on 08/11/2010. 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 Alejandro. She received adjuvant Tamoxifen from 05/25/2018 through 07/13/2018 at which point she underwent bilateral nipple sparing mastectomies. She has since undergone placement of permanent breast implants bilaterally on 11/09/2018. The patient is here alone (usually with her spouse, Juvenal).     I have reviewed the HPI and verified with the patient the accuracy of it. No changes to interval history since the information was documented. Tommy Richmond MD 06/28/22     DIAGNOSTIC ABNORMALITIES: Ductal carcinoma-in-situ (DCIS)   1. Bilateral mammogram, 01/31/2018, [facility]. Comparison: 01/30/2017, 01/27/2016, 03/23/2015. Impression: Left breast calcifications for which additional imaging is recommended including CC and lateral magnification and 3D full lateral views. BI-RADS Category 0. No mammographic evidence of right breast malignancy.   2. Unilateral left diagnostic mammogram with CAD, 02/05/2018, [facility]. Comparison to screening mammogram of 01/31/2018. Impression: Indeterminate left breast calcifications for which stereotactic guided biopsy is recommended. BI-RADS Category 4.   3. Left breast core biopsies at 1 o’clock position, 03/05/2018 showing focal  "atypical ductal hyperplasia (0.1 cm).  4. Diagnosis: Breast, left needle localized excisional biopsy (KIS- 18,- 1450, 13 slides), 04/05/2018: Intermediate grade ductal carcinoma in situ, solid type; scar and radiation change; previous biopsy.  5. ER reported, 04/05/2018: On same specimen 2 blocks - #1450-5: ER (+) 29%; #1450-3: ER (-) 0%. The latter discussed and clarified with Dr. Vinny Lomeli and Dr. Parisa Nagel of pathology who confirmed that these are from the same specimen but heterogeneously expressed ER.    PREVIOUS INTERVENTIONS: Ductal carcinoma-in-situ (DCIS)   1. Left breast, excisional biopsy with needle localization, 03/30/2018 (Dr. Alejandro). FINAL DIAGNOSIS: 1. A few scattered foci of intermediate grade ductal carcinoma in situ. 2. No invasive malignancy identified. 3. Changes of previous biopsy site including fat necrosis and cicatrix formation. 4. No ductal carcinoma in situ identified at the inked margins of surgical excision. AJCC stage: pTis (DCIS) pNX - ER reported, 04/05/2018: On same specimen 2 blocks - #1450-5: ER (+) 29%; #1450-3: ER (-) 0%  2. Office encounter with Dr. Alejandro on 04/30/2018: \"After a prolonged discussion lasting 60 minutes, we felt it was most appropriate that she undergo close observation and no surgical intervention at present. Her DCIS has not been sent for ER yet. We will make sure this is sent. We will also send for Prelude DX. She will call back in 2 weeks to discuss her ER/ID positivity or not. I will see her back in 5 months with a unilateral left mammogram.\"   3. Adjuvant Tamoxifen beginning 05/25/2018 through 07/13/2018.  4. Bilateral nipple sparing mastectomies, Dr. Alejandro. Followed by reconstruction by Dr. Mark, 07/13/2018. Final Diagnoses: A) Right Breast, Skin-Sparing Mastectomy: No evidence of malignancy. Mild benign fibrocystic changes. B) Left Breast, Skin-Sparing Mastectomy: Microscopic focus of residual ductal carcinoma in situ, cribriform pattern, low " nuclear grade. Greatest dimension of the residual ductal carcinoma in situ is 0.8 cm. Closest surgical margins is the posterior margin (deep margin) at 1.1 cm. No evidence of invasive carcinoma. Pathologic AJCC Classification: ypTis.     DIAGNOSTIC ABNORMALITIES: Infiltrating ductal carcinoma left breast   1. Mammography, 03/15/2005. A new area of developing nodularity with calcifications deep in the upper outer quadrant of the left breast. Biopsy was recommended.  2. Biopsy of left breast, 04/06/2006. Infiltrating ductal carcinoma, high-grade, strongly ER (82% positive), weakly OK (9% positive), and HER-2/analisa negative.    PREVIOUS INTERVENTIONS: Infiltrating ductal carcinoma left breast   1. Left breast lumpectomy with sentinel lymph node biopsies, 04/12/2005. A 2.2 cm tumor mass of infiltrating high-grade ductal carcinoma with a component of ductal carcinoma in situ, high-grade. 2 of 4 sentinel nodes positive.  2. Formal left axillary node dissection, 04/26/2005. Six additional nodes retrieved. All 6 negative for evidence of metastatic carcinoma.  3. Adjuvant Adriamycin, Cytoxan, and Taxotere. From 05/26/2005 through 08/04/2005. Six cycles completed.   4. Tamoxifen. From 08/11/2005 through 08/11/2010.  5. Radiation treatments to left breast, 09/12/2005 to 10/26/2005, 6040 cGy.        Problem List Items Addressed This Visit    None       Oncology/Hematology History   Ductal carcinoma in situ (DCIS) of left breast   12/15/2019 Initial Diagnosis    Ductal carcinoma in situ (DCIS) of left breast     12/15/2019 Cancer Staged    Staging form: Breast, AJCC 8th Edition  - Clinical stage from 12/15/2019: Stage 0 (cTis (DCIS), cN0, cM0, ER+, OK+, HER2-) - Signed by Tommy Richmond MD on 12/15/2019         PAST MEDICAL HISTORY:  ALLERGIES:  Allergies   Allergen Reactions   • Adhesive Tape Rash     CURRENT MEDICATIONS:  Outpatient Encounter Medications as of 6/28/2022   Medication Sig Dispense Refill   • Calcium  Carb-Cholecalciferol 500-400 MG-UNIT chewable tablet Chew.     • fluticasone (FLONASE) 50 MCG/ACT nasal spray 2 sprays into the nostril(s) as directed by provider Daily.     • glucosamine-chondroitin 500-400 MG capsule capsule Take  by mouth 3 (Three) Times a Day With Meals.     • loratadine (CLARITIN) 10 MG tablet Take 10 mg by mouth Daily.       No facility-administered encounter medications on file as of 6/28/2022.     ADULT ILLNESSES:   Dcis of the breast ( ICD-10:D05.12 ;Intraductal carcinoma in situ of left breast   Personal history of breast cancer ( ICD-10:Z85.3 ;Personal history of malignant neoplasm of breast   LINDA - Lorenzana's oesophagus ( ICD-10:K22.70 ;Lorenzana's esophagus without dysplasia   Depression ( ICD-10:F32.9 ;Major depressive disorder, single episode, unspecified   Hypercholesterolemia ( ICD-10:E78.00 ;Pure hypercholesterolemia, unspecified   Immunodeficiency disorder (disorder) ( ICD-10:D84.9 ;Immunodeficiency, unspecified   Osteoarthritis knee ( degenerative joint disease of knees; ICD-10:M17.0 ;Bilateral primary osteoarthritis of knee )   Syncope and collapse ( vasomotor instability; ICD-10:R55 ;Syncope and collapse )    SURGERIES:   Upper GI endoscopy, 02/17/2016   Knee surgery, repeat, 1984   Knee surgery, 1980   Dissection procedure: Formal left axillary node dissection, 04/26/2005   Breast implantation, bilateral, 11/09/2018, Dr. Mark   Colonoscopy, 2006   Endoscopy, 2011   Lumpectomy of breast, left, with sentinel node biopsy, 04/12/2005   Colonoscopy, 02/17/2016: Per Dr. Park's note: ASC lymphoid follicle, TV x2 polypoid lesions that were not polyps/hyperplastic change, diverticulosis   Cone biopsy of cervix, 11/2004   Endoscopy performed on 02/28/2019 at [facility]. - LA Grade B reflux esophagitis. - Esophageal mucosal changes secondary to established short-segment Lorenzana's disease. Biopsied. - Medium-sized hiatal hernia. - Normal stomach. - Normal examined duodenum.       ADULT  ILLNESSES:  Patient Active Problem List   Diagnosis Code   • History of Lorenzana's esophagus Z87.19   • History of esophagitis Z87.19   • History of breast cancer Z85.3   • Ductal carcinoma in situ (DCIS) of left breast D05.12   • Atypical ductal hyperplasia of left breast N60.92   • History of thoracic surgery Z98.890   • CHEK2-related breast cancer (HCC) C50.919, Z15.02, Z15.89, Z15.09     SURGERIES:  Past Surgical History:   Procedure Laterality Date   • ADENOIDECTOMY     • BREAST AUGMENTATION Bilateral    • BREAST BIOPSY Left 2005   • BREAST LUMPECTOMY      2005   • COLONOSCOPY  02/17/2016    Colon polyps; Diverticulosis; Repeat 3-5 years    • COLONOSCOPY  02/26/2007    Dr. Matute-Repeat in 5 years    • ENDOSCOPY  02/17/2016    Hiatal hernia, LA Grade C and Lorenzana's   • ENDOSCOPY  10/03/2011    Hiatal hernia; Lorenzana's    • ENDOSCOPY  02/22/2010    Dr. Matute-hiatal hernia; Lorenzana's    • ENDOSCOPY  12/22/2009    Dr. Matute-Esophageal ulcers; gastritis; hiatal hernia-Repeat in 2 months    • ENDOSCOPY N/A 2/28/2019    Procedure: ESOPHAGOGASTRODUODENOSCOPY WITH ANESTHESIA;  Surgeon: Sylvia Park MD;  Location: [facility] ENDOSCOPY;  Service: Gastroenterology   • KNEE ARTHROPLASTY     • MASTECTOMY Bilateral    • TONSILLECTOMY       HEALTH MAINTENANCE ITEMS:  Health Maintenance Due   Topic Date Due   • COVID-19 Vaccine (1) Never done   • TDAP/TD VACCINES (1 - Tdap) Never done   • ZOSTER VACCINE (1 of 2) Never done   • ANNUAL WELLNESS VISIT  Never done   • Pneumococcal Vaccine 65+ (1 - PCV) Never done   • MAMMOGRAM  02/17/2022       <no information>  Last Completed Colonoscopy          COLORECTAL CANCER SCREENING (COLONOSCOPY - Every 10 Years) Next due on 2/17/2030 02/17/2020  COLONOSCOPY (Done - UTD)    03/08/2019  COLONOSCOPY (Done - UTD)    01/19/2018  SmartData: WORKFLOW - QUALITY MEASUREMENT - EXCLUSION REASONS - COLORECTAL CANCER SCREENING NOT PERFORMED FOR MEDICAL REASONS    01/19/2018  Occult  "Blood, Fecal By Immunoassay - Stool, Per Rectum    01/17/2017  Occult Blood, Fecal By Immunoassay    Only the first 5 history entries have been loaded, but more history exists.                There is no immunization history on file for this patient.  Last Completed Mammogram     This patient has no relevant Health Maintenance data.            FAMILY HISTORY:  Family History   Problem Relation Age of Onset   • Stroke Father    • Leukemia Mother    • No Known Problems Brother    • No Known Problems Sister    • No Known Problems Sister    • Breast cancer Paternal Aunt 60   • No Known Problems Daughter    • No Known Problems Son    • No Known Problems Maternal Grandmother    • No Known Problems Paternal Grandmother    • No Known Problems Maternal Aunt    • Breast cancer Other 48   • Ovarian cancer Neg Hx    • Uterine cancer Neg Hx    • Colon cancer Neg Hx    • Melanoma Neg Hx    • Prostate cancer Neg Hx    • BRCA 1/2 Neg Hx    • Endometrial cancer Neg Hx      SOCIAL HISTORY:  Social History     Socioeconomic History   • Marital status: Single   Tobacco Use   • Smoking status: Former Smoker   • Smokeless tobacco: Never Used   Substance and Sexual Activity   • Alcohol use: Yes     Comment: Rarely   • Drug use: No   • Sexual activity: Yes       REVIEW OF SYSTEMS:  Constitutional:   The patient's appetite and energy are fairly good. \"Feel pretty good.\" She manages all her ADLs and remains active.  Volunteers for the [employer].  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 She has gained 2 pounds (in addition to 1 pound at her prior visit) since her last visit. She has no fevers, chills, or drenching night sweats. Her sleep habits are erratic.  Ear/Nose/Mouth/Throat:   She reports no ear pains, sinus symptoms, sore throat, nosebleeds, or sore tongue. She reports no headaches. She reports no hoarseness, change in voice quality.  Ocular:   She reports no eye pain, significant change in visual acuity, double vision, or " "blurry vision.  Respiratory:   She reports no chronic cough, significant shortness of breathing, phlegm production, or unexplained chest wall pain.  Cardiovascular:   She reports no exertional chest pain, chest pressure, or chest heaviness. She reports no claudication. She reports no palpitations or symptomatic orthostasis.  Gastrointestinal:   She reports no epigastric \"discomfort\".  Is off PPI and otherwise has no dysphagia, nausea, vomiting, postprandial abdominal pain, bloating, cramping, change in bowel habits, or discoloration of the stool. She reports no rectal bleeding. She reports no constipation or diarrhea. Had EGD, 02/2019 (above).  Genitourinary:   She reports no urinary burning, frequency, dribbling, or discoloration. She reports no need to urinate frequently through the night. She reports no difficulty controlling her bladder.  GYN:   She reports no unexplained vaginal bleeding and no significant vaginal discharge.  Breasts:   She reports no breast discomfort since the implant placements. \"Tight sometimes.\"  Musculoskeletal:   She has no unexplained arthralgias, myalgias, or nighttime leg cramping.  Extremities:   She reports no trouble with fluid retention or significant leg swelling.  Heme/Lymphatic:   She reports no unexplained bleeding, bruising, petechial rash, or swollen glands.  Skin:   She reports no itching, rashes, or lesions which won't heal.  Neuro:   She reports no loss of consciousness, seizures, fainting spells, or dizziness. She reports no weakness of her face, arms, or legs. She reports no difficulty with speech. She has no tremors.  Psych:   She seems generally satisfied with life. She reports no depression. She reports no mood swings.      VITAL SIGNS: /74   Pulse 54   Temp 97.9 °F (36.6 °C) (Temporal)   Resp 18   Ht 162.6 cm (64.02\")   Wt 65.3 kg (143 lb 14.4 oz)   LMP 01/19/2010 (Approximate)   SpO2 96%   BMI 24.68 kg/m² Body surface area is 1.7 meters squared.  Pain " Score    06/28/22 0832   PainSc: 0-No pain         PHYSICAL EXAMINATION:   General:   She is a pleasant, cooperative, well-developed, well-nourished, and modestly-kept middle aged female in no distress. She arrived in the exam room ambulatory. She appears to be her stated age. Her skin color is normal. ECOG PS = 0.  Head/Neck:   The patient is anicteric and atraumatic. The trachea is midline. The neck is supple without evidence of jugular venous distention or cervical adenopathy.  Eyes:   The pupils are equal, round, and reactive to light. The extraocular movements are full. There is no scleral jaundice or erythema.  Chest:   The respiratory efforts are normal and unhindered. The chest is clear to auscultation. There are no wheezes, rhonchi, rales, or asymmetry of breath sounds.  Breasts: Chaperoned exam-Radhika Jama LPN  The right breast is notable for an implant. No axillary adenopathy. The left breast is notable for an implant. No axillary adenopathy.  No supraclavicular adenopathy bilaterally.  No cervical adenopathy bilaterally.  Cardiovascular:   The patient has a regular cardiac rate and rhythm without murmurs, rubs, or gallops. The peripheral pulses are equal and full.  Extremities:   There is no evidence of cyanosis, clubbing, or edema.   Cutaneous:   She has no other overt rashes, disseminated lesions, purpura, or petechiae.  Lymphatics:   There is no evidence of adenopathy in the cervical, supraclavicular, axillary areas areas.   Neurologic:   The patient is alert, oriented, cooperative, and pleasant. She is appropriately conversant. She ambulated into the exam room without assistance and transferred from chair to exam table unaided. There is no overt dysfunction of the motor, sensory, or cerebellar systems.  Psych:   Mood and affect are appropriate for circumstance. Eye contact is appropriate.        LABS      ASSESSMENT:   1. Ductal carcinoma in situ (DCIS):   Stage: AJCC Stage: pTis (DCIS) pNX.  "ER, 04/05/2018: On same specimen 2 blocks - #1450-5: ER (+) 29%; #1450-3: ER (-) 0%.   Tumor Holmes: Left breast, excisional biopsy with needle localization, 03/30/2018 (Dr. Alejandro). FINAL DIAGNOSIS: 1. A few scattered foci of intermediate grade ductal carcinoma in situ. 2. No invasive malignancy identified. 3. Changes of previous biopsy site including fat necrosis and cicatrix formation. 4. No ductal carcinoma in situ identified at the inked margins of surgical excision   Tumor Status following excisional biopsy, 03/30/2018 and bilateral nipple sparing mastectomies, 07/13/2018: ALFREDO  2. Personal history of carcinoma of the left breast, grade 3 infiltrating ductal carcinoma:   Stage: IIB (pT2 pN1 N0 G4) ER 82%, HI 9%, HER-2/analisa +2.   Baseline Node Status: 2 of 4 sentinel nodes positive.   Tumor Holmes: No evidence of malignancy.   Tumor Status: Mammogram, bilateral, 01/30/2017 ([facility]) Comparison: 01/27/2016, 03/23/2015, 02/19/2013,01/21/2011. No mammographic evidence of malignancy. The patient should return for screening mammography in 12 months or sooner, if clinically indicated.   Prognosis: Good.  3. Intermittent (mild) leukopenia. Observation warranted. Normal since 06/24/2019.   4. Hypercholesterolemia. Diet controlled.  Followed by pcp (Dr. Rivera)  5. 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   7. History of depression.   8. Self-directed. Had previously declined scheduled repeat EGD per Dr. Park.  Has been missing follow-up appointments.  a. Letter from Dr. Park (patient missed EGD). On 11/20/2014, we called the patient to let her know that we received a letter from GI (Dr. Park) noting patient declined repeat EGD Had previously explained to the patient of the letter advising repeat EGD for Lorenzana's esophagus. Had strongly encouraged the patient to follow through. She had stated \"I don't think it is necessary and I have already called Dr. Park's office telling them so and I don't " "see the need in this.\"   b. Discussed again at her visit on 02/04/2015. She replies that \"I'm doing fine and I don't want to be pressured into this. I'll discuss this with somebody else before deciding.\"   c. Discussed again at her visit last 08/05/2015. She said, \"I don't want to be probed or prodded unless I need to be.\"   d. Upper GI endoscopy, 02/17/2016 ([facility]), - Small hiatus hernia - LA Grade C reflux esophagitis. Esophageal mucosal changes secondary to established short-segment Lorenzana's disease. Biopsied. Normal stomach. Normal examined duodenum.   e. Endoscopy performed on 02/28/2019 at [facility]. - LA Grade B reflux esophagitis. - Esophageal mucosal changes secondary to established short-segment Lorenzana's disease. Biopsied. - Medium-sized hiatal hernia. - Normal stomach. - Normal examined duodenum.    PLAN:  1.  Apprised of labs from 06/21/2022 with normal CBC, normal CMP, normalized (2.65; from 2.81; from 3.27; from 2.01; from 1.98; from 2.14) CEA, normal CA 27-29.   2.  Previously discussed that on biopsy of 03/30/2018, ER reported, 04/05/2018: On same specimen 2 blocks - #1450-5: ER (+) 29%; #1450-3: ER (-) 0%. The latter discussed and clarified with Dr. Vinny Lomeli and Dr. Parisa Nagel of pathology who confirmed that these are from the same specimen but heterogeneously expressed ER.   3.  Diagnostic information previously discussed. Previously reviewed imaging studies (bilateral mammogram, 01/31/2018 and unilateral left diagnostic mammogram, 02/05/2018), path from left breast core biopsies at 1 o’clock position, 03/05/3018 showing focal atypical ductal hyperplasia (0.1 cm), then needle localization and excisional biopsy of left breast mass, 03/30/2018 and office encounters from Dr. Alejandro, most recently on 04/30/2018. \"After a prolonged discussion lasting 60 minutes, we felt it was most appropriate that she undergo close observation and no surgical intervention at present. Her " "DCIS has not been sent for ER yet. We will make sure this is sent. We will also send for Prelude DX. She will call back in 2 weeks to discuss her ER/RI positivity or not. I will see her back in 5 months with a unilateral left mammogram.\" I discussed the options (see #5 following). Underwent bilateral mastectomies on 07/13/2018 (above).   4.  Ductal carcinoma in situ (DCIS) previously discussed. I had noted that many, but not all cases of DCIS progress to invasive carcinoma within a woman's lifetime. The available therapies were reviewed:  a) Mastectomy is curative 98-99% of the time (Given the information available, I feel that this is the procedure the patient will benefit the most from, and the procedure she favors anyway).   b) Localized DCIS may be treated with breast sparing surgery and irradiation (recurrence rate = 9-17%).   c) Excision alone may be considered in those with less than 1-2 cm low grade lesions.   d) Tamoxifen should be considered to reduce (from 13% to 8%) the risk of ipsilateral breast recurrence after breast sparing surgery, and to reduce the risk of contralateral breast cancer in all women.   5.  Previously acknowledged her self-directed approach.   6.  Continue currently identified medications.   7.  Continue management per primary care and other specialists.   8.  Return to office in 12 months with pre-office CBC with differential, CEA, CA27-29, and CMP.       I spent ~ 30 minutes caring for Evelia on this date of service. This time includes time spent by me in the following activities: preparing for the visit, reviewing tests, performing a medically appropriate examination and/or evaluation, counseling and educating the patient/family/caregiver, ordering medications, tests, or procedures and documenting information in the medical record      cc: MD Frandy Cummings MD Pamela Reed, MD         "

## 2022-06-22 LAB — CANCER AG27-29 SERPL-ACNC: 14.7 U/ML (ref 0–38.6)

## 2022-06-28 ENCOUNTER — OFFICE VISIT (OUTPATIENT)
Dept: ONCOLOGY | Facility: CLINIC | Age: 67
End: 2022-06-28

## 2022-06-28 VITALS
HEART RATE: 54 BPM | DIASTOLIC BLOOD PRESSURE: 74 MMHG | OXYGEN SATURATION: 96 % | SYSTOLIC BLOOD PRESSURE: 130 MMHG | RESPIRATION RATE: 18 BRPM | HEIGHT: 64 IN | WEIGHT: 143.9 LBS | TEMPERATURE: 97.9 F | BODY MASS INDEX: 24.57 KG/M2

## 2022-06-28 DIAGNOSIS — D05.12 DUCTAL CARCINOMA IN SITU (DCIS) OF LEFT BREAST: Primary | ICD-10-CM

## 2022-06-28 DIAGNOSIS — R97.8 OTHER ABNORMAL TUMOR MARKERS: ICD-10-CM

## 2022-06-28 DIAGNOSIS — R97.0 ELEVATED CARCINOEMBRYONIC ANTIGEN (CEA): ICD-10-CM

## 2022-06-28 PROCEDURE — 99214 OFFICE O/P EST MOD 30 MIN: CPT | Performed by: INTERNAL MEDICINE

## 2022-06-28 RX ORDER — FLUTICASONE PROPIONATE 50 MCG
2 SPRAY, SUSPENSION (ML) NASAL DAILY
COMMUNITY

## 2023-01-03 ENCOUNTER — OFFICE VISIT (OUTPATIENT)
Dept: OBSTETRICS AND GYNECOLOGY | Facility: CLINIC | Age: 68
End: 2023-01-03
Payer: MEDICARE

## 2023-01-03 VITALS
BODY MASS INDEX: 24.07 KG/M2 | WEIGHT: 141 LBS | SYSTOLIC BLOOD PRESSURE: 128 MMHG | DIASTOLIC BLOOD PRESSURE: 84 MMHG | HEIGHT: 64 IN

## 2023-01-03 DIAGNOSIS — E78.00 ELEVATED CHOLESTEROL: ICD-10-CM

## 2023-01-03 DIAGNOSIS — Z13.820 OSTEOPOROSIS SCREENING: ICD-10-CM

## 2023-01-03 DIAGNOSIS — Z01.419 WELL WOMAN EXAM WITH ROUTINE GYNECOLOGICAL EXAM: Primary | ICD-10-CM

## 2023-01-03 DIAGNOSIS — R73.09 ELEVATED GLUCOSE LEVEL: ICD-10-CM

## 2023-01-03 DIAGNOSIS — Z78.0 POST-MENOPAUSAL: ICD-10-CM

## 2023-01-03 DIAGNOSIS — E55.9 VITAMIN D DEFICIENCY: ICD-10-CM

## 2023-01-03 PROCEDURE — 1159F MED LIST DOCD IN RCRD: CPT | Performed by: NURSE PRACTITIONER

## 2023-01-03 PROCEDURE — 87624 HPV HI-RISK TYP POOLED RSLT: CPT | Performed by: NURSE PRACTITIONER

## 2023-01-03 PROCEDURE — G0101 CA SCREEN;PELVIC/BREAST EXAM: HCPCS | Performed by: NURSE PRACTITIONER

## 2023-01-03 PROCEDURE — G0123 SCREEN CERV/VAG THIN LAYER: HCPCS | Performed by: NURSE PRACTITIONER

## 2023-01-03 PROCEDURE — 1160F RVW MEDS BY RX/DR IN RCRD: CPT | Performed by: NURSE PRACTITIONER

## 2023-01-03 PROCEDURE — 87625 HPV TYPES 16 & 18 ONLY: CPT | Performed by: NURSE PRACTITIONER

## 2023-01-03 NOTE — PROGRESS NOTES
Chief Complaint  Annual Exam (Pt is here for an annual exam.  Pt has no complaints.  )    Subjective          Evelia Kerns presents to [facility]  History of Present Illness    Review of Systems   Constitutional: Negative for activity change, appetite change, fatigue and fever.   HENT: Negative for congestion, sore throat and trouble swallowing.    Eyes: Negative for pain, discharge and visual disturbance.   Respiratory: Negative for apnea, shortness of breath and wheezing.    Cardiovascular: Negative for chest pain, palpitations and leg swelling.   Gastrointestinal: Negative for abdominal pain, constipation and diarrhea.   Genitourinary: Negative for frequency, pelvic pain, urgency and vaginal discharge.   Musculoskeletal: Negative for back pain and gait problem.   Skin: Negative for color change and rash.   Neurological: Negative for dizziness, weakness and numbness.   Psychiatric/Behavioral: Negative for confusion and sleep disturbance.        Objective   Vital Signs:   /84   Ht 162.6 cm (64\")   Wt 64 kg (141 lb)   BMI 24.20 kg/m²     Physical Exam  Vitals and nursing note reviewed. Exam conducted with a chaperone present.   Constitutional:       General: She is not in acute distress.     Appearance: She is well-developed. She is not diaphoretic.   HENT:      Head: Normocephalic.      Right Ear: External ear normal.      Left Ear: External ear normal.      Nose: Nose normal.   Eyes:      General: No scleral icterus.        Right eye: No discharge.         Left eye: No discharge.      Conjunctiva/sclera: Conjunctivae normal.      Pupils: Pupils are equal, round, and reactive to light.   Neck:      Thyroid: No thyromegaly.      Vascular: No carotid bruit.      Trachea: No tracheal deviation.   Cardiovascular:      Rate and Rhythm: Normal rate and regular rhythm.      Heart sounds: Normal heart sounds. No murmur heard.  Pulmonary:      Effort: Pulmonary effort is normal. No  respiratory distress.      Breath sounds: Normal breath sounds. No wheezing.   Chest:   Breasts:     Right: No swelling, bleeding, inverted nipple, mass or tenderness.      Left: No swelling, bleeding, inverted nipple, mass or tenderness.      Comments: Bilateral mastectomy  Bilateral breast augmentation    Abdominal:      General: There is no distension.      Palpations: Abdomen is soft. There is no mass.      Tenderness: There is no abdominal tenderness. There is no right CVA tenderness, left CVA tenderness or guarding.      Hernia: No hernia is present. There is no hernia in the left inguinal area or right inguinal area.   Genitourinary:     General: Normal vulva.      Exam position: Lithotomy position.      Labia:         Right: No rash, tenderness, lesion or injury.         Left: No rash, tenderness, lesion or injury.       Vagina: Normal. No signs of injury and foreign body. No vaginal discharge, erythema, tenderness or bleeding.      Cervix: Normal.      Uterus: Normal. Not enlarged, not fixed and not tender.       Adnexa: Right adnexa normal and left adnexa normal.        Right: No mass, tenderness or fullness.          Left: No mass, tenderness or fullness.        Rectum: Normal. No mass.      Comments:   BSU normal  Urethral meatus  Normal  Perineum  Normal  Musculoskeletal:         General: No tenderness. Normal range of motion.      Cervical back: Normal range of motion and neck supple.   Lymphadenopathy:      Head:      Right side of head: No submental, submandibular, tonsillar, preauricular, posterior auricular or occipital adenopathy.      Left side of head: No submental, submandibular, tonsillar, preauricular, posterior auricular or occipital adenopathy.      Cervical: No cervical adenopathy.      Right cervical: No superficial, deep or posterior cervical adenopathy.     Left cervical: No superficial, deep or posterior cervical adenopathy.      Upper Body:      Right upper body: No supraclavicular,  axillary or pectoral adenopathy.      Left upper body: No supraclavicular, axillary or pectoral adenopathy.      Lower Body: No right inguinal adenopathy. No left inguinal adenopathy.   Skin:     General: Skin is warm and dry.      Findings: No bruising, erythema or rash.   Neurological:      Mental Status: She is alert and oriented to person, place, and time.      Coordination: Coordination normal.   Psychiatric:         Mood and Affect: Mood normal.         Behavior: Behavior normal.         Thought Content: Thought content normal.         Judgment: Judgment normal.         Result Review :   The following data was reviewed by: NATO Bowling on 01/03/2023:    Data reviewed: Radiologic studies dexa scan              Assessment and Plan      Well woman GYN exam.   Pap smear done per ASCCP guidelines.   Will have lab work at this office.     Encouraged SBE, pt is aware how to do self breast exam and the importance of same.   Discussed weight management and importance of maintaining a healthy weight.   Discussed Vitamin D intake and the importance of adequate vitamin D for both Bone Health and a healthy immune system.    Discussed Daily exercise and the importance of same, in regards to a healthy heart as well as helping to maintain her weight and improving her mental health.     Colonoscopy is up to date.     Bone density ordered.     Discussed STD prevention and testing.   Pt declines Chlamydia/Gonorrhea/Trichomonas, RPR, Hep panel and HIV testing.     Mammogram d/c'd r/t double mastectomy.   Pt is followed by [facility] r/t breast CA hx.       Diagnoses and all orders for this visit:    1. Well woman exam with routine gynecological exam (Primary)  -     CBC & Differential  -     Comprehensive Metabolic Panel  -     Hemoglobin A1c  -     Lipid Panel With LDL / HDL Ratio  -     Vitamin D,25-Hydroxy  -     Liquid-based Pap Smear, Screening    2. Vitamin D deficiency  -     Vitamin D,25-Hydroxy    3. Elevated  glucose level  -     Hemoglobin A1c    4. Elevated cholesterol  -     Lipid Panel With LDL / HDL Ratio    5. Osteoporosis screening  -     DEXA Bone Density Axial; Future    6. Post-menopausal  -     DEXA Bone Density Axial; Future          BMI is within normal parameters. No other follow-up for BMI required.       Follow Up   Return in about 2 years (around 1/3/2025) for Annual physical.    Patient was given instructions and counseling regarding her condition or for health maintenance advice. Please see specific information pulled into the AVS if appropriate.

## 2023-01-03 NOTE — PATIENT INSTRUCTIONS
BMI for Adults  What is BMI?  Body mass index (BMI) is a number that is calculated from a person's weight and height. BMI can help estimate how much of a person's weight is composed of fat. BMI does not measure body fat directly. Rather, it is an alternative to procedures that directly measure body fat, which can be difficult and expensive.  BMI can help identify people who may be at higher risk for certain medical problems.  What are BMI measurements used for?  BMI is used as a screening tool to identify possible weight problems. It helps determine whether a person is obese, overweight, a healthy weight, or underweight.  BMI is useful for:  Identifying a weight problem that may be related to a medical condition or may increase the risk for medical problems.  Promoting changes, such as changes in diet and exercise, to help reach a healthy weight. BMI screening can be repeated to see if these changes are working.  How is BMI calculated?  BMI involves measuring your weight in relation to your height. Both height and weight are measured, and the BMI is calculated from those numbers. This can be done either in English (U[place] or metric measurements. Note that charts and online BMI calculators are available to help you find your BMI quickly and easily without having to do these calculations yourself.  To calculate your BMI in English ([place].S[place] measurements:    Measure your weight in pounds (lb).  Multiply the number of pounds by 703.  For example, for a person who weighs 180 lb, multiply that number by 703, which equals 126,540.  Measure your height in inches. Then multiply that number by itself to get a measurement called \"inches squared.\"  For example, for a person who is 70 inches tall, the \"inches squared\" measurement is 70 inches x 70 inches, which equals 4,900 inches squared.  Divide the total from step 2 (number of lb x 703) by the total from step 3 (inches squared): 126,540 ÷ 4,900 = 25.8. This is your BMI.  To  calculate your BMI in metric measurements:  Measure your weight in kilograms (kg).  Measure your height in meters (m). Then multiply that number by itself to get a measurement called \"meters squared.\"  For example, for a person who is 1.75 m tall, the \"meters squared\" measurement is 1.75 m x 1.75 m, which is equal to 3.1 meters squared.  Divide the number of kilograms (your weight) by the meters squared number. In this example: 70 ÷ 3.1 = 22.6. This is your BMI.  What do the results mean?  BMI charts are used to identify whether you are underweight, normal weight, overweight, or obese. The following guidelines will be used:  Underweight: BMI less than 18.5.  Normal weight: BMI between 18.5 and 24.9.  Overweight: BMI between 25 and 29.9.  Obese: BMI of 30 or above.  Keep these notes in mind:  Weight includes both fat and muscle, so someone with a muscular build, such as an athlete, may have a BMI that is higher than 24.9. In cases like these, BMI is not an accurate measure of body fat.  To determine if excess body fat is the cause of a BMI of 25 or higher, further assessments may need to be done by a health care provider.  BMI is usually interpreted in the same way for men and women.  Where to find more information  For more information about BMI, including tools to quickly calculate your BMI, go to these websites:  Centers for Disease Control and Prevention: www.cdc.gov  American Heart Association: www.heart.org  National Heart, Lung, and Blood [place]: www.nhlbi.nih.gov  Summary  Body mass index (BMI) is a number that is calculated from a person's weight and height.  BMI may help estimate how much of a person's weight is composed of fat. BMI can help identify those who may be at higher risk for certain medical problems.  BMI can be measured using English measurements or metric measurements.  BMI charts are used to identify whether you are underweight, normal weight, overweight, or obese.  This information is not  intended to replace advice given to you by your health care provider. Make sure you discuss any questions you have with your health care provider.  Document Revised: 09/09/2020 Document Reviewed: 07/17/2020  Elsevier Patient Education © 2022 [employer] Inc.

## 2023-01-06 LAB
GEN CATEG CVX/VAG CYTO-IMP: NORMAL
HPV I/H RISK 4 DNA CVX QL PROBE+SIG AMP: DETECTED
HPV16 DNA SPEC QL NAA+PROBE: NOT DETECTED
HPV18+45 E6+E7 MRNA CVX QL NAA+PROBE: NOT DETECTED
LAB AP CASE REPORT: NORMAL
LAB AP GYN ADDITIONAL INFORMATION: NORMAL
LAB AP GYN OTHER FINDINGS: NORMAL
Lab: NORMAL
PATH INTERP SPEC-IMP: NORMAL
STAT OF ADQ CVX/VAG CYTO-IMP: NORMAL

## 2023-03-08 LAB
25(OH)D3+25(OH)D2 SERPL-MCNC: 31.9 NG/ML (ref 30–100)
ALBUMIN SERPL-MCNC: 4.5 G/DL (ref 3.5–5.2)
ALBUMIN/GLOB SERPL: 2.1 G/DL
ALP SERPL-CCNC: 102 U/L (ref 39–117)
ALT SERPL-CCNC: 12 U/L (ref 1–33)
AST SERPL-CCNC: 11 U/L (ref 1–32)
BASOPHILS # BLD AUTO: 0.07 10*3/MM3 (ref 0–0.2)
BASOPHILS NFR BLD AUTO: 1.5 % (ref 0–1.5)
BILIRUB SERPL-MCNC: 0.4 MG/DL (ref 0–1.2)
BUN SERPL-MCNC: 16 MG/DL (ref 8–23)
BUN/CREAT SERPL: 20.8 (ref 7–25)
CALCIUM SERPL-MCNC: 9.4 MG/DL (ref 8.6–10.5)
CHLORIDE SERPL-SCNC: 102 MMOL/L (ref 98–107)
CHOLEST SERPL-MCNC: 275 MG/DL (ref 0–200)
CO2 SERPL-SCNC: 24.9 MMOL/L (ref 22–29)
CREAT SERPL-MCNC: 0.77 MG/DL (ref 0.57–1)
EGFRCR SERPLBLD CKD-EPI 2021: 85.2 ML/MIN/1.73
EOSINOPHIL # BLD AUTO: 0.06 10*3/MM3 (ref 0–0.4)
EOSINOPHIL NFR BLD AUTO: 1.3 % (ref 0.3–6.2)
ERYTHROCYTE [DISTWIDTH] IN BLOOD BY AUTOMATED COUNT: 13.3 % (ref 12.3–15.4)
GLOBULIN SER CALC-MCNC: 2.1 GM/DL
GLUCOSE SERPL-MCNC: 98 MG/DL (ref 65–99)
HBA1C MFR BLD: 5.9 % (ref 4.8–5.6)
HCT VFR BLD AUTO: 35.6 % (ref 34–46.6)
HDLC SERPL-MCNC: 73 MG/DL (ref 40–60)
HGB BLD-MCNC: 11.3 G/DL (ref 12–15.9)
IMM GRANULOCYTES # BLD AUTO: 0.03 10*3/MM3 (ref 0–0.05)
IMM GRANULOCYTES NFR BLD AUTO: 0.6 % (ref 0–0.5)
LDLC SERPL CALC-MCNC: 192 MG/DL (ref 0–100)
LDLC/HDLC SERPL: 2.59 {RATIO}
LYMPHOCYTES # BLD AUTO: 1.38 10*3/MM3 (ref 0.7–3.1)
LYMPHOCYTES NFR BLD AUTO: 29.2 % (ref 19.6–45.3)
MCH RBC QN AUTO: 26.9 PG (ref 26.6–33)
MCHC RBC AUTO-ENTMCNC: 31.7 G/DL (ref 31.5–35.7)
MCV RBC AUTO: 84.8 FL (ref 79–97)
MONOCYTES # BLD AUTO: 0.5 10*3/MM3 (ref 0.1–0.9)
MONOCYTES NFR BLD AUTO: 10.6 % (ref 5–12)
NEUTROPHILS # BLD AUTO: 2.68 10*3/MM3 (ref 1.7–7)
NEUTROPHILS NFR BLD AUTO: 56.8 % (ref 42.7–76)
NRBC BLD AUTO-RTO: 0.2 /100 WBC (ref 0–0.2)
PLATELET # BLD AUTO: 246 10*3/MM3 (ref 140–450)
POTASSIUM SERPL-SCNC: 4.9 MMOL/L (ref 3.5–5.2)
PROT SERPL-MCNC: 6.6 G/DL (ref 6–8.5)
RBC # BLD AUTO: 4.2 10*6/MM3 (ref 3.77–5.28)
SODIUM SERPL-SCNC: 137 MMOL/L (ref 136–145)
TRIGL SERPL-MCNC: 64 MG/DL (ref 0–150)
VLDLC SERPL CALC-MCNC: 10 MG/DL (ref 5–40)
WBC # BLD AUTO: 4.72 10*3/MM3 (ref 3.4–10.8)

## 2023-03-08 RX ORDER — ERGOCALCIFEROL 1.25 MG/1
50000 CAPSULE ORAL WEEKLY
Qty: 12 CAPSULE | Refills: 0 | Status: SHIPPED | OUTPATIENT
Start: 2023-03-08

## 2023-07-05 RX ORDER — CALCIUM CARBONATE/VITAMIN D3 500 MG-10
1 TABLET,CHEWABLE ORAL DAILY
COMMUNITY

## 2023-07-05 RX ORDER — SODIUM FLUORIDE 5 MG/G
GEL, DENTIFRICE DENTAL
COMMUNITY

## 2023-07-05 RX ORDER — LORATADINE 10 MG/1
10 TABLET ORAL DAILY
COMMUNITY

## 2023-07-05 RX ORDER — FLUTICASONE PROPIONATE 50 MCG
2 SPRAY, SUSPENSION (ML) NASAL DAILY
COMMUNITY

## 2023-07-05 RX ORDER — OMEGA-3/DHA/EPA/FISH OIL 300-1000MG
CAPSULE ORAL
COMMUNITY

## 2023-07-05 RX ORDER — ERGOCALCIFEROL 1.25 MG/1
50000 CAPSULE ORAL WEEKLY
COMMUNITY
Start: 2023-03-08 | End: 2023-07-11

## 2023-07-11 ENCOUNTER — OFFICE VISIT (OUTPATIENT)
Dept: GASTROENTEROLOGY | Age: 68
End: 2023-07-11
Payer: MEDICARE

## 2023-07-11 VITALS
BODY MASS INDEX: 23.22 KG/M2 | WEIGHT: 136 LBS | HEIGHT: 64 IN | OXYGEN SATURATION: 98 % | SYSTOLIC BLOOD PRESSURE: 139 MMHG | HEART RATE: 80 BPM | DIASTOLIC BLOOD PRESSURE: 80 MMHG

## 2023-07-11 DIAGNOSIS — D50.9 IRON DEFICIENCY ANEMIA, UNSPECIFIED IRON DEFICIENCY ANEMIA TYPE: Primary | ICD-10-CM

## 2023-07-11 PROCEDURE — 4004F PT TOBACCO SCREEN RCVD TLK: CPT | Performed by: NURSE PRACTITIONER

## 2023-07-11 PROCEDURE — 1123F ACP DISCUSS/DSCN MKR DOCD: CPT | Performed by: NURSE PRACTITIONER

## 2023-07-11 PROCEDURE — 3017F COLORECTAL CA SCREEN DOC REV: CPT | Performed by: NURSE PRACTITIONER

## 2023-07-11 PROCEDURE — G8427 DOCREV CUR MEDS BY ELIG CLIN: HCPCS | Performed by: NURSE PRACTITIONER

## 2023-07-11 PROCEDURE — G8400 PT W/DXA NO RESULTS DOC: HCPCS | Performed by: NURSE PRACTITIONER

## 2023-07-11 PROCEDURE — 99204 OFFICE O/P NEW MOD 45 MIN: CPT | Performed by: NURSE PRACTITIONER

## 2023-07-11 PROCEDURE — 1090F PRES/ABSN URINE INCON ASSESS: CPT | Performed by: NURSE PRACTITIONER

## 2023-07-11 PROCEDURE — G8420 CALC BMI NORM PARAMETERS: HCPCS | Performed by: NURSE PRACTITIONER

## 2023-07-11 RX ORDER — FERROUS SULFATE 325(65) MG
325 TABLET ORAL
COMMUNITY
Start: 2023-06-27

## 2023-07-11 RX ORDER — SODIUM PHOSPHATE,MONO-DIBASIC 19G-7G/118
ENEMA (ML) RECTAL
COMMUNITY

## 2023-07-11 ASSESSMENT — ENCOUNTER SYMPTOMS
TROUBLE SWALLOWING: 0
COUGH: 0
CONSTIPATION: 0
BLOOD IN STOOL: 0
CHOKING: 0
DIARRHEA: 0
ABDOMINAL PAIN: 0
ABDOMINAL DISTENTION: 0
ANAL BLEEDING: 0
VOMITING: 0
SHORTNESS OF BREATH: 0
RECTAL PAIN: 0
NAUSEA: 0

## 2023-07-11 NOTE — PROGRESS NOTES
Subjective:     Patient ID: Juan Bolanos is a 78 y.o. female  PCP: Al Parker, APRN - CNP  Referring Provider: Funmi Edwards MD    HPI  Patient presents to the office today with the following complaints: New Patient and Anemia      Patient seen in the office today referred due to iron deficiency anemia  Reports she does have some fatigue and shortness of breath, states she had COVID in May and she thought these symptoms were from that. Reports she does not see any blood in her stool and her stools are not black   Reports she also has a history of Briggs's esophagus         Assessment:     1. Iron deficiency anemia, unspecified iron deficiency anemia type         Plan:   Schedule Colonoscopy and EGD  Instruct on bowel prep. Nothing to eat or drink after midnight the day of the exam.  Unable to drive for 24 hours after the procedure. No aspirin or nonsteroidal anti-inflammatories for 5 days before procedure. I have discussed the benefits, alternatives, and risks (including bleeding, perforation and death)  for pursuing Endoscopy (EGD/Colonscopy/EUS/ERCP) with the patient and they are willing to continue. We also discussed the need for anesthesia, IV access, proper dietary changes, medication changes if necessary, and need for bowel prep (if ordered) prior to their Endoscopic procedure. They are aware they must have someone accompany them to their scheduled procedure to drive them home - they agree to the above and are willing to continue. Orders  No orders of the defined types were placed in this encounter. Medications  No orders of the defined types were placed in this encounter.         Patient History:     Past Medical History:   Diagnosis Date    Abnormal Pap smear of vagina     Atypical ductal hyperplasia of left breast     Briggs's esophagus     Breast cancer ([address] [facility])     Cancer ([address] [facility]) 2005    L breast    DCIS (ductal carcinoma in situ) of breast     LEFT    Esophageal ulcer     Gastritis

## 2023-08-09 ENCOUNTER — TELEPHONE (OUTPATIENT)
Dept: GASTROENTEROLOGY | Age: 68
End: 2023-08-09

## 2023-08-09 NOTE — TELEPHONE ENCOUNTER
Called patient to remind them of their procedure with Dr. Elvin Fischer  at [facility]  on 8/14/23 to arrive at [address]

## 2023-08-14 ENCOUNTER — APPOINTMENT (OUTPATIENT)
Dept: OPERATING ROOM | Age: 68
End: 2023-08-14
Attending: INTERNAL MEDICINE

## 2023-08-14 ENCOUNTER — ANESTHESIA EVENT (OUTPATIENT)
Dept: OPERATING ROOM | Age: 68
End: 2023-08-14

## 2023-08-14 ENCOUNTER — ANESTHESIA (OUTPATIENT)
Dept: OPERATING ROOM | Age: 68
End: 2023-08-14

## 2023-08-14 ENCOUNTER — HOSPITAL ENCOUNTER (OUTPATIENT)
Age: 68
Setting detail: OUTPATIENT SURGERY
Discharge: HOME | End: 2023-08-14
Attending: INTERNAL MEDICINE | Admitting: INTERNAL MEDICINE

## 2023-08-14 ENCOUNTER — HOSPITAL ENCOUNTER (OUTPATIENT)
Age: 68
Setting detail: SPECIMEN
Discharge: HOME | End: 2023-08-14
Payer: MEDICARE

## 2023-08-14 VITALS
HEART RATE: 62 BPM | OXYGEN SATURATION: 99 % | TEMPERATURE: 97.5 F | DIASTOLIC BLOOD PRESSURE: 74 MMHG | SYSTOLIC BLOOD PRESSURE: 137 MMHG | RESPIRATION RATE: 16 BRPM | WEIGHT: 134 LBS | BODY MASS INDEX: 22.88 KG/M2 | HEIGHT: 64 IN

## 2023-08-14 PROCEDURE — 43239 EGD BIOPSY SINGLE/MULTIPLE: CPT

## 2023-08-14 PROCEDURE — G8907 PT DOC NO EVENTS ON DISCHARG: HCPCS

## 2023-08-14 PROCEDURE — 88305 TISSUE EXAM BY PATHOLOGIST: CPT

## 2023-08-14 PROCEDURE — G8918 PT W/O PREOP ORDER IV AB PRO: HCPCS

## 2023-08-14 PROCEDURE — 88342 IMHCHEM/IMCYTCHM 1ST ANTB: CPT

## 2023-08-14 PROCEDURE — 45378 DIAGNOSTIC COLONOSCOPY: CPT

## 2023-08-14 RX ORDER — SODIUM CHLORIDE, SODIUM LACTATE, POTASSIUM CHLORIDE, CALCIUM CHLORIDE 600; 310; 30; 20 MG/100ML; MG/100ML; MG/100ML; MG/100ML
INJECTION, SOLUTION INTRAVENOUS CONTINUOUS
Status: DISCONTINUED | OUTPATIENT
Start: 2023-08-14 | End: 2023-08-14 | Stop reason: HOSPADM

## 2023-08-14 RX ORDER — LIDOCAINE HYDROCHLORIDE 10 MG/ML
INJECTION, SOLUTION EPIDURAL; INFILTRATION; INTRACAUDAL; PERINEURAL PRN
Status: DISCONTINUED | OUTPATIENT
Start: 2023-08-14 | End: 2023-08-14 | Stop reason: SDUPTHER

## 2023-08-14 RX ORDER — PROPOFOL 10 MG/ML
INJECTION, EMULSION INTRAVENOUS PRN
Status: DISCONTINUED | OUTPATIENT
Start: 2023-08-14 | End: 2023-08-14 | Stop reason: SDUPTHER

## 2023-08-14 RX ADMIN — SODIUM CHLORIDE, SODIUM LACTATE, POTASSIUM CHLORIDE, CALCIUM CHLORIDE: 600; 310; 30; 20 INJECTION, SOLUTION INTRAVENOUS at 09:36

## 2023-08-14 RX ADMIN — PROPOFOL 200 MG: 10 INJECTION, EMULSION INTRAVENOUS at 10:00

## 2023-08-14 RX ADMIN — LIDOCAINE HYDROCHLORIDE 30 MG: 10 INJECTION, SOLUTION EPIDURAL; INFILTRATION; INTRACAUDAL; PERINEURAL at 10:00

## 2023-08-14 ASSESSMENT — PAIN SCALES - GENERAL
PAINLEVEL_OUTOF10: 0
PAINLEVEL_OUTOF10: 0

## 2023-08-14 NOTE — H&P
Patient Name: Claude Golds  : 1955  MRN: [id number]  DATE: 23    Allergies: Allergies   Allergen Reactions    Adhesive Tape Rash        ENDOSCOPY  History and Physical    Procedure:    [x] Diagnostic Colonoscopy       [] Screening Colonoscopy  [x] EGD      [] ERCP      [] EUS       [] Other    [x] Previous office notes/History and Physical reviewed from the patients chart. Please see EMR for further details of HPI. I have examined the patient's status immediately prior to the procedure and:      Indications/HPI:    []Abdominal Pain   [x]Barretts  []Screening/Surveillance   []History of Polyps  []Dysphagia            [] +Cologard/DNA testing  []Abnormal Imaging              []EOE Hx              [] Family Hx of CRC/Polyps  [x]Anemia                            []Food Impaction       []Recent Poor Prep  []GI Bleed             []Lymphadenopathy  [x]History of Polyps  []Change in bowel habits []Heartburn/Reflux  []Cancer- GI/Lung  []Chest Pain - Non Cardiac []Heme (+) Stool []Ulcers  []Constipation  []Hemoptysis  []Incontinence    []Diarrhea  []Hypoxemia  []Rectal Bleed (BRBPR)  []Nausea/Vomiting   [] Varices  []Crohns/Colitis  []Pancreatic Cyst   [] Cirrhosis   []Pancreatitis    []Abnormal MRCP  []Elevated LFT [] Stent Removal, Previous ERCP  []Other:     Anesthesia:   [x] MAC [] Moderate Sedation   [] General   [] None     ROS: 12 pt Review of Symptoms was negative unless mentioned above    Medications:   Prior to Admission medications    Medication Sig Start Date End Date Taking?  Authorizing Provider   glucosamine-chondroitin 500-400 MG CAPS Take by mouth 3 times daily (with meals)    Historical Provider, MD   ferrous sulfate (IRON 325) 325 (65 Fe) MG tablet Take 1 tablet by mouth daily (with breakfast) 23   Historical Provider, MD   Calcium Carb-Cholecalciferol 500-10 MG-MCG CHEW Take 1 tablet by mouth daily    Historical Provider, MD   fluticasone (FLONASE) 50 MCG/ACT nasal spray 2 sprays

## 2023-08-14 NOTE — OP NOTE
Patient: Tonia Parnell : 1955  Med Rec#: [id number] Acc#: [id number]   Primary Care Provider Ian Michel MD    Date of Procedure:  2023    Endoscopist: Delano Andrews MD    Referring Provider: Ian Michel MD, ORLANDO Lopez    Operation Performed: Colonoscopy     Indications: Anemia     Anesthesia:  Sedation was administered by anesthesia who monitored the patient during the procedure. I met with Arelis Lucia prior to procedure. We discussed the procedure itself, and I have discussed the risks of endoscopy (including-- but not limited to-- pain, discomfort, bleeding potentially requiring second endoscopic procedure and/or blood transfusion, organ perforation requiring operative repair, damage to organs near the colon, infection, aspiration, cardiopulmonary/allergic reaction), benefits, indications to endoscopy. Additionally, we discussed options other than colonoscopy. The patient expressed understanding. All questions answered. The patient decided to proceed with the procedure. Signed informed consent was placed on the chart. Blood Loss: minimal    Withdrawal time: n/a  Bowel Prep: adequate     Complications: no immediate complications    DESCRIPTION OF PROCEDURE:     A time out was performed. After written informed consent was obtained, the patient was placed in the left lateral position. The perianal area was inspected, and a digital rectal exam was performed. A rectal exam was performed: normal tone, no palpable lesions. At this point, a forward viewing Olympus colonoscope was inserted into the anus and carefully advanced to the terminal ileum. The cecum was identified by the ileocecal valve and the appendiceal orifice. The colonoscope was then slowly withdrawn with careful inspection of the mucosa in a linear and circumferential fashion. The scope was retroflexed in the rectum.  Suction was utilized during the procedure to remove as much air as possible from
for and in the presence of Dr. Mely Pineda MD.  Electronically signed by Hugh Jameson RN on 8/14/2023 at 9:44 AM    I personally performed the services described in this documentation as scribed by Adelaida Delgadillo, and it appears accurate and complete.      Beverly Allen MD  8/14/2023

## 2023-08-14 NOTE — ANESTHESIA POSTPROCEDURE EVALUATION
Department of Anesthesiology  Postprocedure Note    Patient: Claude Golds  MRN: [id number]  YOB: 1955  Date of evaluation: 8/14/2023      Procedure Summary     Date: 08/14/23 [facility] ENDO [facility] / [address]    Anesthesia Start: 8242 Anesthesia Stop:     Procedures:       EGD BIOPSY (Esophagus)      COLONOSCOPY DIAGNOSTIC (Abdomen) Diagnosis:       Iron deficiency anemia, unspecified iron deficiency anemia type      History of Briggs's esophagus      (Iron deficiency anemia, unspecified iron deficiency anemia type [D50.9])      (History of Briggs's esophagus [Z87.19])    Surgeons: Niya Brizuela MD Responsible Provider: ORLANDO Silva CRNA    Anesthesia Type: general, TIVA ASA Status: 2          Anesthesia Type: No value filed.     Stevenson Phase I:      Stevenson Phase II:        Anesthesia Post Evaluation    Patient location during evaluation: bedside  Patient participation: complete - patient participated  Level of consciousness: sleepy but conscious  Pain score: 0  Airway patency: patent  Nausea & Vomiting: no nausea and no vomiting  Complications: no  Cardiovascular status: blood pressure returned to baseline  Respiratory status: acceptable, room air and spontaneous ventilation  Hydration status: euvolemic  Pain management: adequate

## 2023-08-14 NOTE — DISCHARGE INSTRUCTIONS
EGD RECOMMENDATIONS:    1. Await path results  2. Colonoscopy today  3. If patient is experiencing upper GI symptoms, I would consider hiatal hernia repair. Colonoscopy Recommendations:  1. Repeat colonoscopy: 5 years, due to hx of polyps  2. Await biopsy results  POST-OP ORDERS: ENDOSCOPY & COLONOSCOPY:    1. Rest today. 2. DO NOT eat or drink until wide awake; eat your usual diet today in moderate amount only. 3. DO NOT drive today. 4. Call physician if you have severe pain, vomiting, fever, rectal bleeding or black bowel movements. 5.  If a biopsy was taken or a polyp removed, you should expect to hear results in about 21 days. If you have heard nothing from your physician by then, call the office for results. 6.  Discharge home when patient awake, vitals signs stable and tolerating liquids. 7. Call with questions or concerns [phone].

## 2023-10-17 ENCOUNTER — LAB (OUTPATIENT)
Dept: LAB | Facility: HOSPITAL | Age: 68
End: 2023-10-17
Payer: MEDICARE

## 2023-10-17 DIAGNOSIS — E53.8 B12 DEFICIENCY: ICD-10-CM

## 2023-10-17 DIAGNOSIS — R97.8 OTHER ABNORMAL TUMOR MARKERS: Primary | ICD-10-CM

## 2023-10-17 DIAGNOSIS — D05.12 DUCTAL CARCINOMA IN SITU (DCIS) OF LEFT BREAST: ICD-10-CM

## 2023-10-17 LAB
ALBUMIN SERPL-MCNC: 4.6 G/DL (ref 3.5–5.2)
ALBUMIN/GLOB SERPL: 1.8 G/DL
ALP SERPL-CCNC: 103 U/L (ref 39–117)
ALT SERPL W P-5'-P-CCNC: 13 U/L (ref 1–33)
ANION GAP SERPL CALCULATED.3IONS-SCNC: 10 MMOL/L (ref 5–15)
AST SERPL-CCNC: 17 U/L (ref 1–32)
BASOPHILS # BLD AUTO: 0.08 10*3/MM3 (ref 0–0.2)
BASOPHILS NFR BLD AUTO: 1.4 % (ref 0–1.5)
BILIRUB SERPL-MCNC: 0.6 MG/DL (ref 0–1.2)
BUN SERPL-MCNC: 16 MG/DL (ref 8–23)
BUN/CREAT SERPL: 23.9 (ref 7–25)
CALCIUM SPEC-SCNC: 9.5 MG/DL (ref 8.6–10.5)
CEA SERPL-MCNC: 2.87 NG/ML
CHLORIDE SERPL-SCNC: 103 MMOL/L (ref 98–107)
CO2 SERPL-SCNC: 26 MMOL/L (ref 22–29)
CREAT SERPL-MCNC: 0.67 MG/DL (ref 0.57–1)
DEPRECATED RDW RBC AUTO: 45.3 FL (ref 37–54)
EGFRCR SERPLBLD CKD-EPI 2021: 95.9 ML/MIN/1.73
EOSINOPHIL # BLD AUTO: 0.15 10*3/MM3 (ref 0–0.4)
EOSINOPHIL NFR BLD AUTO: 2.7 % (ref 0.3–6.2)
ERYTHROCYTE [DISTWIDTH] IN BLOOD BY AUTOMATED COUNT: 13.3 % (ref 12.3–15.4)
FERRITIN SERPL-MCNC: 42.21 NG/ML (ref 13–150)
FOLATE SERPL-MCNC: 7.45 NG/ML (ref 4.78–24.2)
GLOBULIN UR ELPH-MCNC: 2.5 GM/DL
GLUCOSE SERPL-MCNC: 119 MG/DL (ref 65–99)
HCT VFR BLD AUTO: 45.9 % (ref 34–46.6)
HGB BLD-MCNC: 14.4 G/DL (ref 12–15.9)
IMM GRANULOCYTES # BLD AUTO: 0.03 10*3/MM3 (ref 0–0.05)
IMM GRANULOCYTES NFR BLD AUTO: 0.5 % (ref 0–0.5)
IRON 24H UR-MRATE: 97 MCG/DL (ref 37–145)
IRON SATN MFR SERPL: 22 % (ref 20–50)
LYMPHOCYTES # BLD AUTO: 1.48 10*3/MM3 (ref 0.7–3.1)
LYMPHOCYTES NFR BLD AUTO: 26.5 % (ref 19.6–45.3)
MCH RBC QN AUTO: 29.1 PG (ref 26.6–33)
MCHC RBC AUTO-ENTMCNC: 31.4 G/DL (ref 31.5–35.7)
MCV RBC AUTO: 92.9 FL (ref 79–97)
MONOCYTES # BLD AUTO: 0.44 10*3/MM3 (ref 0.1–0.9)
MONOCYTES NFR BLD AUTO: 7.9 % (ref 5–12)
NEUTROPHILS NFR BLD AUTO: 3.4 10*3/MM3 (ref 1.7–7)
NEUTROPHILS NFR BLD AUTO: 61 % (ref 42.7–76)
NRBC BLD AUTO-RTO: 0 /100 WBC (ref 0–0.2)
PLATELET # BLD AUTO: 239 10*3/MM3 (ref 140–450)
PMV BLD AUTO: 11.1 FL (ref 6–12)
POTASSIUM SERPL-SCNC: 4.4 MMOL/L (ref 3.5–5.2)
PROT SERPL-MCNC: 7.1 G/DL (ref 6–8.5)
RBC # BLD AUTO: 4.94 10*6/MM3 (ref 3.77–5.28)
SODIUM SERPL-SCNC: 139 MMOL/L (ref 136–145)
TIBC SERPL-MCNC: 448 MCG/DL (ref 298–536)
TRANSFERRIN SERPL-MCNC: 301 MG/DL (ref 200–360)
VIT B12 BLD-MCNC: 634 PG/ML (ref 211–946)
WBC NRBC COR # BLD: 5.58 10*3/MM3 (ref 3.4–10.8)

## 2023-10-17 PROCEDURE — 82728 ASSAY OF FERRITIN: CPT

## 2023-10-17 PROCEDURE — 36415 COLL VENOUS BLD VENIPUNCTURE: CPT

## 2023-10-17 PROCEDURE — 82746 ASSAY OF FOLIC ACID SERUM: CPT

## 2023-10-17 PROCEDURE — 86300 IMMUNOASSAY TUMOR CA 15-3: CPT

## 2023-10-17 PROCEDURE — 82607 VITAMIN B-12: CPT

## 2023-10-17 PROCEDURE — 83540 ASSAY OF IRON: CPT

## 2023-10-17 PROCEDURE — 85025 COMPLETE CBC W/AUTO DIFF WBC: CPT

## 2023-10-17 PROCEDURE — 82378 CARCINOEMBRYONIC ANTIGEN: CPT

## 2023-10-17 PROCEDURE — 80053 COMPREHEN METABOLIC PANEL: CPT

## 2023-10-17 PROCEDURE — 84466 ASSAY OF TRANSFERRIN: CPT

## 2023-10-17 NOTE — PROGRESS NOTES
[facility] ON[facility] GROUP HEMATOLOGY AND ONCOLOGY  [address]  [place] [address] [phone]    Patient Name: Evelia Kerns  Encounter Date: 10/24/2023  YOB: 1955  Patient Number: [id number]       REASON FOR VISIT: Ms. Evelia Kerns is a 68-year-old female who returns in follow-up of carcinoma of the left breast. She is seen ~ 218 months following the completion of adjuvant ACT (Adriamycin/ Cytoxan/Taxotere) chemotherapy. She completed 5 years of adjuvant Tamoxifen on 08/11/2010. 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 Alejandro. She received adjuvant Tamoxifen from 05/25/2018 through 07/13/2018 at which point she underwent bilateral nipple sparing mastectomies. She has since undergone placement of permanent breast implants bilaterally on 11/09/2018. The patient is here alone (usually with her spouse, Juvenal).       DIAGNOSTIC ABNORMALITIES: Ductal carcinoma-in-situ (DCIS)   Bilateral mammogram, 01/31/2018, [facility]. Comparison: 01/30/2017, 01/27/2016, 03/23/2015. Impression: Left breast calcifications for which additional imaging is recommended including CC and lateral magnification and 3D full lateral views. BI-RADS Category 0. No mammographic evidence of right breast malignancy.   Unilateral left diagnostic mammogram with CAD, 02/05/2018, [facility]. Comparison to screening mammogram of 01/31/2018. Impression: Indeterminate left breast calcifications for which stereotactic guided biopsy is recommended. BI-RADS Category 4.   Left breast core biopsies at 1 o’clock position, 03/05/2018 showing focal atypical ductal hyperplasia (0.1 cm).  Diagnosis: Breast, left needle localized excisional biopsy (KIS- 18,- 1450, 13 slides), 04/05/2018: Intermediate grade ductal carcinoma in situ, solid  "type; scar and radiation change; previous biopsy.  ER reported, 04/05/2018: On same specimen 2 blocks - #1450-5: ER (+) 29%; #1450-3: ER (-) 0%. The latter discussed and clarified with Dr. Vinny Lomeli and Dr. Parisa Nagel of pathology who confirmed that these are from the same specimen but heterogeneously expressed ER.    PREVIOUS INTERVENTIONS: Ductal carcinoma-in-situ (DCIS)   Left breast, excisional biopsy with needle localization, 03/30/2018 (Dr. Alejandro). FINAL DIAGNOSIS: 1. A few scattered foci of intermediate grade ductal carcinoma in situ. 2. No invasive malignancy identified. 3. Changes of previous biopsy site including fat necrosis and cicatrix formation. 4. No ductal carcinoma in situ identified at the inked margins of surgical excision. AJCC stage: pTis (DCIS) pNX - ER reported, 04/05/2018: On same specimen 2 blocks - #1450-5: ER (+) 29%; #1450-3: ER (-) 0%  Office encounter with Dr. Alejandro on 04/30/2018: \"After a prolonged discussion lasting 60 minutes, we felt it was most appropriate that she undergo close observation and no surgical intervention at present. Her DCIS has not been sent for ER yet. We will make sure this is sent. We will also send for Prelude DX. She will call back in 2 weeks to discuss her ER/GA positivity or not. I will see her back in 5 months with a unilateral left mammogram.\"   Adjuvant Tamoxifen beginning 05/25/2018 through 07/13/2018.  Bilateral nipple sparing mastectomies, Dr. Alejandro. Followed by reconstruction by Dr. Mark, 07/13/2018. Final Diagnoses: A) Right Breast, Skin-Sparing Mastectomy: No evidence of malignancy. Mild benign fibrocystic changes. B) Left Breast, Skin-Sparing Mastectomy: Microscopic focus of residual ductal carcinoma in situ, cribriform pattern, low nuclear grade. Greatest dimension of the residual ductal carcinoma in situ is 0.8 cm. Closest surgical margins is the posterior margin (deep margin) at 1.1 cm. No evidence of invasive carcinoma. Pathologic " AJCC Classification: ypTis.     DIAGNOSTIC ABNORMALITIES: Infiltrating ductal carcinoma left breast   Mammography, 03/15/2005. A new area of developing nodularity with calcifications deep in the upper outer quadrant of the left breast. Biopsy was recommended.  Biopsy of left breast, 04/06/2006. Infiltrating ductal carcinoma, high-grade, strongly ER (82% positive), weakly NC (9% positive), and HER-2/analisa negative.    PREVIOUS INTERVENTIONS: Infiltrating ductal carcinoma left breast   Left breast lumpectomy with sentinel lymph node biopsies, 04/12/2005. A 2.2 cm tumor mass of infiltrating high-grade ductal carcinoma with a component of ductal carcinoma in situ, high-grade. 2 of 4 sentinel nodes positive.  Formal left axillary node dissection, 04/26/2005. Six additional nodes retrieved. All 6 negative for evidence of metastatic carcinoma.  Adjuvant Adriamycin, Cytoxan, and Taxotere. From 05/26/2005 through 08/04/2005. Six cycles completed.   Tamoxifen. From 08/11/2005 through 08/11/2010.  Radiation treatments to left breast, 09/12/2005 to 10/26/2005, 6040 cGy.        Problem List Items Addressed This Visit          Other    Ductal carcinoma in situ (DCIS) of left breast - Primary         Oncology/Hematology History   Ductal carcinoma in situ (DCIS) of left breast   12/15/2019 Initial Diagnosis    Ductal carcinoma in situ (DCIS) of left breast     12/15/2019 Cancer Staged    Staging form: Breast, AJCC 8th Edition  - Clinical stage from 12/15/2019: Stage 0 (cTis (DCIS), cN0, cM0, ER+, NC+, HER2-) - Signed by Tommy Richmond MD on 12/15/2019         PAST MEDICAL HISTORY:  ALLERGIES:  Allergies   Allergen Reactions    Adhesive Tape Rash     CURRENT MEDICATIONS:  Outpatient Encounter Medications as of 10/24/2023   Medication Sig Dispense Refill    Calcium Carb-Cholecalciferol 500-400 MG-UNIT chewable tablet Chew.      ferrous sulfate 325 (65 FE) MG tablet Take 1 tablet by mouth Daily With Breakfast. 60 tablet 3     fluticasone (FLONASE) 50 MCG/ACT nasal spray 2 sprays into the nostril(s) as directed by provider Daily.      glucosamine-chondroitin 500-400 MG capsule capsule Take  by mouth 3 (Three) Times a Day With Meals.      loratadine (CLARITIN) 10 MG tablet Take 1 tablet by mouth Daily.      Omega-3 Fatty Acids (OMEGA 3 PO) Take  by mouth.      Sodium Fluoride 1.1 % cream Apply  to teeth.      [DISCONTINUED] vitamin D (ERGOCALCIFEROL) 1.25 MG (30013 UT) capsule capsule Take 1 capsule by mouth 1 (One) Time Per Week. (Patient not taking: Reported on 10/24/2023) 12 capsule 0     No facility-administered encounter medications on file as of 10/24/2023.     ADULT ILLNESSES:   Dcis of the breast ( ICD-10:D05.12 ;Intraductal carcinoma in situ of left breast   Personal history of breast cancer ( ICD-10:Z85.3 ;Personal history of malignant neoplasm of breast   LINDA - Lorenzana's oesophagus ( ICD-10:K22.70 ;Lorenzana's esophagus without dysplasia   Depression ( ICD-10:F32.9 ;Major depressive disorder, single episode, unspecified   Hypercholesterolemia ( ICD-10:E78.00 ;Pure hypercholesterolemia, unspecified   Immunodeficiency disorder (disorder) ( ICD-10:D84.9 ;Immunodeficiency, unspecified   Osteoarthritis knee ( degenerative joint disease of knees; ICD-10:M17.0 ;Bilateral primary osteoarthritis of knee )   Syncope and collapse ( vasomotor instability; ICD-10:R55 ;Syncope and collapse )    SURGERIES:   Upper GI endoscopy, 02/17/2016   Knee surgery, repeat, 1984   Knee surgery, 1980   Dissection procedure: Formal left axillary node dissection, 04/26/2005   Breast implantation, bilateral, 11/09/2018, Dr. Mark   Colonoscopy, 2006   Endoscopy, 2011   Lumpectomy of breast, left, with sentinel node biopsy, 04/12/2005   Colonoscopy, 02/17/2016: Per Dr. Park's note: ASC lymphoid follicle, TV x2 polypoid lesions that were not polyps/hyperplastic change, diverticulosis   Cone biopsy of cervix, 11/2004   Endoscopy performed on 02/28/2019 at [facility]. - LA Grade B reflux esophagitis. - Esophageal mucosal changes secondary to established short-segment Lorenzana's disease. Biopsied. - Medium-sized hiatal hernia. - Normal stomach. - Normal examined duodenum.       ADULT ILLNESSES:  Patient Active Problem List   Diagnosis Code    History of Lorenzana's esophagus Z87.19    History of esophagitis Z87.19    History of breast cancer Z85.3    Ductal carcinoma in situ (DCIS) of left breast D05.12    Atypical ductal hyperplasia of left breast N60.92    History of thoracic surgery Z98.890    CHEK2-related breast cancer C50.919, Z15.02, Z15.89, Z15.09     SURGERIES:  Past Surgical History:   Procedure Laterality Date    ADENOIDECTOMY      BREAST AUGMENTATION Bilateral     BREAST BIOPSY Left 2005    BREAST LUMPECTOMY      2005    COLONOSCOPY  02/17/2016    Colon polyps; Diverticulosis; Repeat 3-5 years     COLONOSCOPY  02/26/2007    Dr. Matute-Repeat in 5 years     ENDOSCOPY  02/17/2016    Hiatal hernia, LA Grade C and Lorenzana's    ENDOSCOPY  10/03/2011    Hiatal hernia; Lorenzana's     ENDOSCOPY  02/22/2010    Dr. Matute-hiatal hernia; Lorenzana's     ENDOSCOPY  12/22/2009    Dr. Matute-Esophageal ulcers; gastritis; hiatal hernia-Repeat in 2 months     ENDOSCOPY N/A 2/28/2019    Procedure: ESOPHAGOGASTRODUODENOSCOPY WITH ANESTHESIA;  Surgeon: Sylvia Park MD;  Location: [facility] ENDOSCOPY;  Service: Gastroenterology    KNEE ARTHROPLASTY      MASTECTOMY Bilateral     TONSILLECTOMY       HEALTH MAINTENANCE ITEMS:  Health Maintenance Due   Topic Date Due    ANNUAL WELLNESS VISIT  Never done    ZOSTER VACCINE (3 of 3) 07/27/2019    Pneumococcal Vaccine 65+ (1 - PCV) Never done    INFLUENZA VACCINE  08/01/2023    COVID-19 Vaccine (5 - 2023-24 season) 09/01/2023       <no information>  Last Completed Colonoscopy            COLORECTAL CANCER SCREENING (COLONOSCOPY - Every 10 Years) Next due on 8/14/2033 08/14/2023  Outside Procedure: NV COLONOSCOPY FLX DX W/COLLJ  "SPEC WHEN PFRMD    07/21/2021  Cologuard - Stool, Per Rectum    02/17/2020  COLONOSCOPY (Done - UTD)    03/08/2019  COLONOSCOPY (Done - UTD)    01/19/2018  SmartData: WORKFLOW - QUALITY MEASUREMENT - EXCLUSION REASONS - COLORECTAL CANCER SCREENING NOT PERFORMED FOR MEDICAL REASONS    Only the first 5 history entries have been loaded, but more history exists.                  Immunization History   Administered Date(s) Administered    COVID-19 (MODERNA) 1st,2nd,3rd Dose Monovalent 03/04/2021, 04/01/2021     Last Completed Mammogram       This patient has no relevant Health Maintenance data.              FAMILY HISTORY:  Family History   Problem Relation Age of Onset    Stroke Father     Leukemia Mother     No Known Problems Brother     No Known Problems Sister     No Known Problems Sister     Breast cancer Paternal Aunt 60    No Known Problems Daughter     No Known Problems Son     No Known Problems Maternal Grandmother     No Known Problems Paternal Grandmother     No Known Problems Maternal Aunt     Breast cancer Other 48    Ovarian cancer Neg Hx     Uterine cancer Neg Hx     Colon cancer Neg Hx     Melanoma Neg Hx     Prostate cancer Neg Hx     BRCA 1/2 Neg Hx     Endometrial cancer Neg Hx      SOCIAL HISTORY:  Social History     Socioeconomic History    Marital status: Single   Tobacco Use    Smoking status: Former    Smokeless tobacco: Never   Substance and Sexual Activity    Alcohol use: Yes     Comment: Rarely    Drug use: No    Sexual activity: Yes       REVIEW OF SYSTEMS:  This and that...  Constitutional:   The patient's appetite and energy are fairly good. \"I still feel pretty good but had covid.\" She manages all her ADLs and remains active.  Still volunteers for the [employer].  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 She has regained 2 lb (had lost 9 lb at her prior visit) since her last visit. She has no fevers, chills, or drenching night sweats. Her sleep habits are " "erratic.  Ear/Nose/Mouth/Throat:   She reports no ear pains, sinus symptoms, sore throat, nosebleeds, or sore tongue. She reports no headaches. She reports no hoarseness, change in voice quality.  Ocular:   She reports no eye pain, significant change in visual acuity, double vision, or blurry vision.  Respiratory:   She reports no chronic cough, significant shortness of breathing, phlegm production, or unexplained chest wall pain.  Cardiovascular:   She reports no exertional chest pain, chest pressure, or chest heaviness. She reports no claudication. She reports no palpitations or symptomatic orthostasis.  Gastrointestinal:   She reports no epigastric \"discomfort\".  Is off PPI and otherwise has no dysphagia, nausea, vomiting, postprandial abdominal pain, bloating, cramping, change in bowel habits, or discoloration of the stool. She reports no rectal bleeding. She reports no constipation or diarrhea. Had EGD, 02/2019 (above).  Last c-scope 2016  Genitourinary:   She reports no urinary burning, frequency, dribbling, or discoloration. She reports no need to urinate frequently through the night. She reports no difficulty controlling her bladder.  GYN:   She reports no unexplained vaginal bleeding and no significant vaginal discharge.  Breasts:   She reports no breast discomfort since the implant placements. \"Still feels tight.\"  Musculoskeletal:   She has no unexplained arthralgias, myalgias, or nighttime leg cramping.  Extremities:   She reports no trouble with fluid retention or significant leg swelling.  Heme/Lymphatic:   She reports no unexplained bleeding, bruising, petechial rash, or swollen glands.  Skin:   She reports no itching, rashes, or lesions which won't heal.  Neuro:   She reports no loss of consciousness, seizures, fainting spells, or dizziness. She reports no weakness of her face, arms, or legs. She reports no difficulty with speech. She has no tremors.  Psych:   She seems generally satisfied with life. " "She reports no depression. She reports no mood swings.      VITAL SIGNS: /78   Pulse 88   Temp 97.4 °F (36.3 °C) (Temporal)   Resp 18   Ht 162.6 cm (64\")   Wt 61.7 kg (136 lb 1.6 oz)   LMP 01/19/2010 (Approximate)   SpO2 97%   BMI 23.36 kg/m² Body surface area is 1.66 meters squared.  Pain Score    10/24/23 0830   PainSc: 0-No pain             PHYSICAL EXAMINATION:   General:   She is a pleasant, cooperative, well-developed, well-nourished, and modestly-kept middle aged female in no distress. She arrived in the exam room ambulatory. She appears to be her stated age. Her skin color is normal. ECOG PS = 0.  Head/Neck:   The patient is anicteric and atraumatic. The trachea is midline. The neck is supple without evidence of jugular venous distention or cervical adenopathy.  Eyes:   The pupils are equal, round, and reactive to light. The extraocular movements are full. There is no scleral jaundice or erythema.  Chest:   The respiratory efforts are normal and unhindered. The chest is clear to auscultation. There are no wheezes, rhonchi, rales, or asymmetry of breath sounds.  Breasts: Chaperoned exam-Flor Quintanilla MA  The right breast is notable for an implant. No axillary adenopathy. The left breast is notable for an implant. No axillary adenopathy.  No supraclavicular adenopathy bilaterally.  No cervical adenopathy bilaterally.  Cardiovascular:   The patient has a regular cardiac rate and rhythm without murmurs, rubs, or gallops. The peripheral pulses are equal and full.  Extremities:   There is no evidence of cyanosis, clubbing, or edema.   Cutaneous:   She has no other overt rashes, disseminated lesions, purpura, or petechiae.  Lymphatics:   There is no evidence of adenopathy in the cervical, supraclavicular, axillary areas areas.   Neurologic:   The patient is alert, oriented, cooperative, and pleasant. She is appropriately conversant. She ambulated into the exam room without assistance and transferred " from chair to exam table unaided. There is no overt dysfunction of the motor, sensory, or cerebellar systems.  Psych:   Mood and affect are appropriate for circumstance. Eye contact is appropriate.        LABS      ASSESSMENT:   1. Ductal carcinoma in situ (DCIS):   Stage: AJCC Stage: pTis (DCIS) pNX. ER, 04/05/2018: On same specimen 2 blocks - #1450-5: ER (+) 29%; #1450-3: ER (-) 0%.   Tumor Hooper: Left breast, excisional biopsy with needle localization, 03/30/2018 (Dr. Alejandro). FINAL DIAGNOSIS: 1. A few scattered foci of intermediate grade ductal carcinoma in situ. 2. No invasive malignancy identified. 3. Changes of previous biopsy site including fat necrosis and cicatrix formation. 4. No ductal carcinoma in situ identified at the inked margins of surgical excision   Previously discussed that on biopsy of 03/30/2018, ER reported, 04/05/2018: On same specimen 2 blocks - #1450-5: ER (+) 29%; #1450-3: ER (-) 0%. The latter discussed and clarified with Dr. Vinny Lomeli and Dr. Parisa Nagel of pathology who confirmed that these are from the same specimen but heterogeneously expressed ER.   Tumor Status following excisional biopsy, 03/30/2018 and bilateral nipple sparing mastectomies, 07/13/2018: ALFREDO  2. Personal history of carcinoma of the left breast, grade 3 infiltrating ductal carcinoma:   Stage: IIB (pT2 pN1 N0 G4) ER 82%, MI 9%, HER-2/analisa +2.   Baseline Node Status: 2 of 4 sentinel nodes positive.   Tumor Hooper: No evidence of malignancy.   Tumor Status: Mammogram, bilateral, 01/30/2017 ([facility]) Comparison: 01/27/2016, 03/23/2015, 02/19/2013,01/21/2011. No mammographic evidence of malignancy. The patient should return for screening mammography in 12 months or sooner, if clinically indicated.   Prognosis: Good.  3. Mild normocytic anemia.  Evidence for iron deficiency.    --Resolved: Hgb 14.4; MCV 92.9, 10/17/23 (prior:  Hgb 11.3-13.7)  --6/20/23- iron 48, fe sat 9%, ferritin 11  --7/11/23- Seen  "by [facility]- scheduled for EGD+c-scope  --Ferrous sulfate since 6/27/23  4. Osteopenia  --7/17/23- DEXA scan-Osteopenia. Low bone mass.The bone density is between 1.0 and 2.5 standard deviations below the mean for a young adult patient. There is increased risk of fracture in this patient. Slightly increased lumbar spine bone density and slightly decreased left  hip bone density compared with 2 years ago. Overall relatively stable  bone density values.  5.  Hypercholesterolemia. Diet controlled.  Followed by pcp (Dr. Rivera)  5. 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   7. History of depression.   8. Self-directed. Had previously declined scheduled repeat EGD per Dr. Park.  Has been missing follow-up appointments.  a. Letter from Dr. Park (patient missed EGD). On 11/20/2014, we called the patient to let her know that we received a letter from GI (Dr. Park) noting patient declined repeat EGD Had previously explained to the patient of the letter advising repeat EGD for Lorenzana's esophagus. Had strongly encouraged the patient to follow through. She had stated \"I don't think it is necessary and I have already called Dr. Park's office telling them so and I don't see the need in this.\"   b. Discussed again at her visit on 02/04/2015. She replies that \"I'm doing fine and I don't want to be pressured into this. I'll discuss this with somebody else before deciding.\"   c. Discussed again at her visit last 08/05/2015. She said, \"I don't want to be probed or prodded unless I need to be.\"   d. Upper GI endoscopy, 02/17/2016 ([facility]), - Small hiatus hernia - LA Grade C reflux esophagitis. Esophageal mucosal changes secondary to established short-segment Lorenzana's disease. Biopsied. Normal stomach. Normal examined duodenum.   e. Endoscopy performed on 02/28/2019 at [facility]. - LA Grade B reflux esophagitis. - Esophageal mucosal changes secondary to established short-segment Lorenzana's disease. " "Biopsied. - Medium-sized hiatal hernia. - Normal stomach. - Normal examined duodenum.    PLAN:  1.  Apprised of labs from 10/17/23 with Hgb 14.4 (prior: 11.5) otherwise normal CBC, glucose 119 otherwise normal CMP, iron 97, fe sat22%, ferritin 42 (prior: 11); CEA 2.8 (1.98- 3.27), CA 27-29 normal.     2.  Apprised of DEXA scan 7/17/23 (above).  PCP following    3.   Obtain reports of EGD/c-scope from GI at [facility] Re:  New onset iron deficiency anemia  4.   Diagnostic information previously discussed. Previously reviewed imaging studies (bilateral mammogram, 01/31/2018 and unilateral left diagnostic mammogram, 02/05/2018), path from left breast core biopsies at 1 o’clock position, 03/05/3018 showing focal atypical ductal hyperplasia (0.1 cm), then needle localization and excisional biopsy of left breast mass, 03/30/2018 and office encounters from Dr. Alejandro, most recently on 04/30/2018. \"After a prolonged discussion lasting 60 minutes, we felt it was most appropriate that she undergo close observation and no surgical intervention at present. Her DCIS has not been sent for ER yet. We will make sure this is sent. We will also send for Prelude DX. She will call back in 2 weeks to discuss her ER/UT positivity or not. I will see her back in 5 months with a unilateral left mammogram.\" I discussed the options (see #5 following). Underwent bilateral mastectomies on 07/13/2018 (above).   5.  Ductal carcinoma in situ (DCIS) previously discussed. I had noted that many, but not all cases of DCIS progress to invasive carcinoma within a woman's lifetime. The available therapies were reviewed:  a) Mastectomy is curative 98-99% of the time (Given the information available, I feel that this is the procedure the patient will benefit the most from, and the procedure she favors anyway).   b) Localized DCIS may be treated with breast sparing surgery and irradiation (recurrence rate = 9-17%).   c) Excision alone may be considered in those " with less than 1-2 cm low grade lesions.   d) Tamoxifen should be considered to reduce (from 13% to 8%) the risk of ipsilateral breast recurrence after breast sparing surgery, and to reduce the risk of contralateral breast cancer in all women.   6.  Previously acknowledged her self-directed approach.   7.  Continue currently identified medications.   8.  Continue management per primary care and other specialists.   9.  Return to office in 16 weeks with pre-office CBC with differential, iron, fe sat, ferritin, CEA, CA27-29, and CMP.       I spent ~ 35 minutes caring for Evelia on this date of service. This time includes time spent by me in the following activities: preparing for the visit, reviewing tests, performing a medically appropriate examination and/or evaluation, counseling and educating the patient/family/caregiver, ordering medications, tests, or procedures and documenting information in the medical record      cc: MD Frandy Cummings MD Pamela Reed, MD

## 2023-10-18 LAB — CANCER AG27-29 SERPL-ACNC: 18.5 U/ML (ref 0–38.6)

## 2023-10-24 ENCOUNTER — OFFICE VISIT (OUTPATIENT)
Dept: ONCOLOGY | Facility: CLINIC | Age: 68
End: 2023-10-24
Payer: MEDICARE

## 2023-10-24 VITALS
OXYGEN SATURATION: 97 % | RESPIRATION RATE: 18 BRPM | DIASTOLIC BLOOD PRESSURE: 78 MMHG | WEIGHT: 136.1 LBS | SYSTOLIC BLOOD PRESSURE: 122 MMHG | HEART RATE: 88 BPM | TEMPERATURE: 97.4 F | BODY MASS INDEX: 23.23 KG/M2 | HEIGHT: 64 IN

## 2023-10-24 DIAGNOSIS — D05.12 DUCTAL CARCINOMA IN SITU (DCIS) OF LEFT BREAST: Primary | ICD-10-CM

## 2023-10-24 DIAGNOSIS — R97.8 OTHER ABNORMAL TUMOR MARKERS: ICD-10-CM

## 2023-10-24 RX ORDER — FERROUS SULFATE 325(65) MG
325 TABLET ORAL
Qty: 60 TABLET | Refills: 3 | Status: CANCELLED | OUTPATIENT
Start: 2023-10-24

## 2024-03-27 ENCOUNTER — OFFICE VISIT (OUTPATIENT)
Dept: OBSTETRICS AND GYNECOLOGY | Age: 69
End: 2024-03-27
Payer: MEDICARE

## 2024-03-27 VITALS
DIASTOLIC BLOOD PRESSURE: 78 MMHG | BODY MASS INDEX: 23.39 KG/M2 | HEIGHT: 64 IN | SYSTOLIC BLOOD PRESSURE: 120 MMHG | WEIGHT: 137 LBS

## 2024-03-27 DIAGNOSIS — Z86.19 HISTORY OF HPV INFECTION: ICD-10-CM

## 2024-03-27 DIAGNOSIS — Z12.4 PAPANICOLAOU SMEAR: Primary | ICD-10-CM

## 2024-03-27 DIAGNOSIS — Z85.3 HISTORY OF BREAST CANCER: ICD-10-CM

## 2024-03-27 PROCEDURE — 99213 OFFICE O/P EST LOW 20 MIN: CPT | Performed by: NURSE PRACTITIONER

## 2024-03-27 PROCEDURE — 1159F MED LIST DOCD IN RCRD: CPT | Performed by: NURSE PRACTITIONER

## 2024-03-27 PROCEDURE — 87624 HPV HI-RISK TYP POOLED RSLT: CPT | Performed by: NURSE PRACTITIONER

## 2024-03-27 PROCEDURE — 1160F RVW MEDS BY RX/DR IN RCRD: CPT | Performed by: NURSE PRACTITIONER

## 2024-03-27 PROCEDURE — G0123 SCREEN CERV/VAG THIN LAYER: HCPCS | Performed by: NURSE PRACTITIONER

## 2024-03-27 NOTE — PROGRESS NOTES
"Chief Complaint  Gynecologic Exam (Pt is here for a one year pap repeat due to +HPV on 1/3/23. Pt has no complaints today. )    Subjective          Evelia Kerns presents to [facility]  History of Present Illness  The patient is a 68-year-old female who presents for a Pap smear.    She was told that she had HPV.   She had a flu vaccine about a month before the test and she thinks that might have done something because she is not sexually active.   She has been sexually active in the past.   She had a procedure in her uterine area in 2004.   [facility].    She has an appointment with Dr. Richmond next month.   She was started on extra iron because her iron saturation was low post COVID-19.   She gets up early and does all her house maintenance, yard maintenance, and walks on the treadmill.   She thought she was tired, but she figured it was due to her age or COVID-19.   She is not taking any extra iron now.   She feels fine. Her blood pressure was normal.    She has started taking her allergy medicine every day.   She uses Nasonex spray.     She has had a very significant sensitivity to most medicines.    She had a strong reaction to the flu vaccine.    Review of Systems   Genitourinary:  Negative for dysuria, pelvic pain and vaginal bleeding.         Objective   Vital Signs:   /78   Ht 162.6 cm (64\")   Wt 62.1 kg (137 lb)   BMI 23.52 kg/m²     Physical Exam  Constitutional:       Appearance: She is well-developed.   HENT:      Head: Normocephalic.   Neck:      Trachea: No tracheal deviation.   Cardiovascular:      Rate and Rhythm: Normal rate.   Pulmonary:      Breath sounds: Normal breath sounds. No wheezing.   Abdominal:      Palpations: Abdomen is soft.      Tenderness: There is no abdominal tenderness.   Genitourinary:     Labia:         Right: No rash, tenderness or lesion.         Left: No rash, tenderness or lesion.  "      Vagina: No vaginal discharge, erythema or tenderness.      Cervix: No cervical motion tenderness or discharge.      Uterus: Not deviated, not enlarged and not tender.       Adnexa:         Right: No mass, tenderness or fullness.          Left: No mass, tenderness or fullness.        Rectum: Normal.   Skin:     General: Skin is warm and dry.      Findings: No rash.   Neurological:      Mental Status: She is alert and oriented to person, place, and time.   Psychiatric:         Speech: Speech normal.         Behavior: Behavior normal.         Result Review :   The following data was reviewed by: NATO Bowling on 03/27/2024:               Current Outpatient Medications on File Prior to Visit   Medication Sig    Calcium Carb-Cholecalciferol 500-400 MG-UNIT chewable tablet Chew.    fluticasone (FLONASE) 50 MCG/ACT nasal spray 2 sprays into the nostril(s) as directed by provider Daily.    glucosamine-chondroitin 500-400 MG capsule capsule Take  by mouth 3 (Three) Times a Day With Meals.    loratadine (CLARITIN) 10 MG tablet Take 1 tablet by mouth Daily.    Multiple Vitamins-Minerals (CENTRUM ADULT 50+ MULTIGUMMIES PO) Take  by mouth.    ferrous sulfate 325 (65 FE) MG tablet Take 1 tablet by mouth Daily With Breakfast. (Patient not taking: Reported on 3/27/2024)    Omega-3 Fatty Acids (OMEGA 3 PO) Take  by mouth. (Patient not taking: Reported on 3/27/2024)    Sodium Fluoride 1.1 % cream Apply  to teeth. (Patient not taking: Reported on 3/27/2024)     No current facility-administered medications on file prior to visit.          Assessment and Plan      Pap smear collected.   Pelvic exam with chaperone present.     Follow-up  The patient will follow up in 1 year for her regular annual exam.    Diagnoses and all orders for this visit:    1. Papanicolaou smear (Primary)    2. History of HPV infection  -     Liquid-based Pap Smear, Screening  -     HPV DNA Probe, Direct - ThinPrep Vial, Cervix    3. History of  breast cancer            BMI is within normal parameters. No other follow-up for BMI required.       Follow Up   Return for Annual physical.    Patient was given instructions and counseling regarding her condition or for health maintenance advice. Please see specific information pulled into the AVS if appropriate.       Transcribed from ambient dictation for NATO Bowling by Alea Trinidad.  03/27/24   09:29 CDT    Patient or patient representative verbalized consent to the visit recording.  I have personally performed the services described in this document as transcribed by the above individual, and it is both accurate and complete.  NATO Bowling  4/9/2024  22:07 CDT

## 2024-03-28 LAB
GEN CATEG CVX/VAG CYTO-IMP: NORMAL
HPV I/H RISK 4 DNA CVX QL PROBE+SIG AMP: NOT DETECTED
LAB AP CASE REPORT: NORMAL
LAB AP GYN ADDITIONAL INFORMATION: NORMAL
LAB AP GYN OTHER FINDINGS: NORMAL
Lab: NORMAL
PATH INTERP SPEC-IMP: NORMAL
STAT OF ADQ CVX/VAG CYTO-IMP: NORMAL

## 2024-04-10 NOTE — PATIENT INSTRUCTIONS

## 2024-04-16 ENCOUNTER — LAB (OUTPATIENT)
Dept: LAB | Facility: HOSPITAL | Age: 69
End: 2024-04-16
Payer: MEDICARE

## 2024-04-16 DIAGNOSIS — D05.12 DUCTAL CARCINOMA IN SITU (DCIS) OF LEFT BREAST: ICD-10-CM

## 2024-04-16 DIAGNOSIS — R97.8 OTHER ABNORMAL TUMOR MARKERS: ICD-10-CM

## 2024-04-16 LAB
ALBUMIN SERPL-MCNC: 4.5 G/DL (ref 3.5–5.2)
ALBUMIN/GLOB SERPL: 1.9 G/DL
ALP SERPL-CCNC: 106 U/L (ref 39–117)
ALT SERPL W P-5'-P-CCNC: 14 U/L (ref 1–33)
ANION GAP SERPL CALCULATED.3IONS-SCNC: 9 MMOL/L (ref 5–15)
AST SERPL-CCNC: 16 U/L (ref 1–32)
BASOPHILS # BLD AUTO: 0.08 10*3/MM3 (ref 0–0.2)
BASOPHILS NFR BLD AUTO: 1.6 % (ref 0–1.5)
BILIRUB SERPL-MCNC: 0.4 MG/DL (ref 0–1.2)
BUN SERPL-MCNC: 14 MG/DL (ref 8–23)
BUN/CREAT SERPL: 20.9 (ref 7–25)
CALCIUM SPEC-SCNC: 9.4 MG/DL (ref 8.6–10.5)
CEA SERPL-MCNC: 2.66 NG/ML
CHLORIDE SERPL-SCNC: 103 MMOL/L (ref 98–107)
CO2 SERPL-SCNC: 28 MMOL/L (ref 22–29)
CREAT SERPL-MCNC: 0.67 MG/DL (ref 0.57–1)
DEPRECATED RDW RBC AUTO: 42.5 FL (ref 37–54)
EGFRCR SERPLBLD CKD-EPI 2021: 95.9 ML/MIN/1.73
EOSINOPHIL # BLD AUTO: 0.09 10*3/MM3 (ref 0–0.4)
EOSINOPHIL NFR BLD AUTO: 1.9 % (ref 0.3–6.2)
ERYTHROCYTE [DISTWIDTH] IN BLOOD BY AUTOMATED COUNT: 12.3 % (ref 12.3–15.4)
FERRITIN SERPL-MCNC: 32.49 NG/ML (ref 13–150)
GLOBULIN UR ELPH-MCNC: 2.4 GM/DL
GLUCOSE SERPL-MCNC: 105 MG/DL (ref 65–99)
HCT VFR BLD AUTO: 41.8 % (ref 34–46.6)
HGB BLD-MCNC: 13.4 G/DL (ref 12–15.9)
IMM GRANULOCYTES # BLD AUTO: 0.01 10*3/MM3 (ref 0–0.05)
IMM GRANULOCYTES NFR BLD AUTO: 0.2 % (ref 0–0.5)
IRON 24H UR-MRATE: 75 MCG/DL (ref 37–145)
IRON SATN MFR SERPL: 18 % (ref 20–50)
LYMPHOCYTES # BLD AUTO: 1.59 10*3/MM3 (ref 0.7–3.1)
LYMPHOCYTES NFR BLD AUTO: 32.7 % (ref 19.6–45.3)
MCH RBC QN AUTO: 30 PG (ref 26.6–33)
MCHC RBC AUTO-ENTMCNC: 32.1 G/DL (ref 31.5–35.7)
MCV RBC AUTO: 93.5 FL (ref 79–97)
MONOCYTES # BLD AUTO: 0.54 10*3/MM3 (ref 0.1–0.9)
MONOCYTES NFR BLD AUTO: 11.1 % (ref 5–12)
NEUTROPHILS NFR BLD AUTO: 2.55 10*3/MM3 (ref 1.7–7)
NEUTROPHILS NFR BLD AUTO: 52.5 % (ref 42.7–76)
NRBC BLD AUTO-RTO: 0 /100 WBC (ref 0–0.2)
PLATELET # BLD AUTO: 217 10*3/MM3 (ref 140–450)
PMV BLD AUTO: 11.7 FL (ref 6–12)
POTASSIUM SERPL-SCNC: 4 MMOL/L (ref 3.5–5.2)
PROT SERPL-MCNC: 6.9 G/DL (ref 6–8.5)
RBC # BLD AUTO: 4.47 10*6/MM3 (ref 3.77–5.28)
SODIUM SERPL-SCNC: 140 MMOL/L (ref 136–145)
TIBC SERPL-MCNC: 419 MCG/DL (ref 298–536)
TRANSFERRIN SERPL-MCNC: 281 MG/DL (ref 200–360)
WBC NRBC COR # BLD AUTO: 4.86 10*3/MM3 (ref 3.4–10.8)

## 2024-04-16 PROCEDURE — 36415 COLL VENOUS BLD VENIPUNCTURE: CPT

## 2024-04-16 PROCEDURE — 85025 COMPLETE CBC W/AUTO DIFF WBC: CPT

## 2024-04-16 PROCEDURE — 83540 ASSAY OF IRON: CPT

## 2024-04-16 PROCEDURE — 86300 IMMUNOASSAY TUMOR CA 15-3: CPT

## 2024-04-16 PROCEDURE — 84466 ASSAY OF TRANSFERRIN: CPT

## 2024-04-16 PROCEDURE — 82378 CARCINOEMBRYONIC ANTIGEN: CPT

## 2024-04-16 PROCEDURE — 80053 COMPREHEN METABOLIC PANEL: CPT

## 2024-04-16 PROCEDURE — 82728 ASSAY OF FERRITIN: CPT

## 2024-04-17 LAB — CANCER AG27-29 SERPL-ACNC: 21.5 U/ML (ref 0–38.6)

## 2024-04-17 NOTE — PROGRESS NOTES
MGW ONC [facility] GROUP HEMATOLOGY AND ONCOLOGY  [address]  [place] [address] [phone]    Patient Name: Evelia Kerns  Encounter Date: 04/23/2024  YOB: 1955  Patient Number: [id number]       REASON FOR VISIT:  Ms. Evelia Kerns is a 68-year-old female who returns in follow-up of carcinoma of the left breast, stage: IIB (pT2 pN1 N0 G4) ER 82%, CA 9%, HER-2/analisa +2.  She is seen ~ 224 months following the completion of adjuvant ACT (Adriamycin/ Cytoxan/Taxotere) chemotherapy. She completed 5 years of adjuvant Tamoxifen on 08/11/2010. 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 Alejandro. She received adjuvant Tamoxifen from 05/25/2018 through 07/13/2018 at which point she underwent bilateral nipple sparing mastectomies. She has since undergone placement of permanent breast implants bilaterally on 11/09/2018. The patient is here alone (usually with her spouse, Juvenal).     I have reviewed the HPI and verified with the patient the accuracy of it. No changes to interval history since the information was documented. Tommy Richmond MD 04/23/24      DIAGNOSTIC ABNORMALITIES: Ductal carcinoma-in-situ (DCIS)   Bilateral mammogram, 01/31/2018, [facility]. Comparison: 01/30/2017, 01/27/2016, 03/23/2015. Impression: Left breast calcifications for which additional imaging is recommended including CC and lateral magnification and 3D full lateral views. BI-RADS Category 0. No mammographic evidence of right breast malignancy.   Unilateral left diagnostic mammogram with CAD, 02/05/2018, [facility]. Comparison to screening mammogram of 01/31/2018. Impression: Indeterminate left breast calcifications for which stereotactic guided biopsy is recommended. BI-RADS Category 4.   Left breast core biopsies at  "1 o’clock position, 03/05/2018 showing focal atypical ductal hyperplasia (0.1 cm).  Diagnosis: Breast, left needle localized excisional biopsy (KIS- 18,- 1450, 13 slides), 04/05/2018: Intermediate grade ductal carcinoma in situ, solid type; scar and radiation change; previous biopsy.  ER reported, 04/05/2018: On same specimen 2 blocks - #1450-5: ER (+) 29%; #1450-3: ER (-) 0%. The latter discussed and clarified with Dr. Vinny Lomeli and Dr. Parisa Nagel of pathology who confirmed that these are from the same specimen but heterogeneously expressed ER.    PREVIOUS INTERVENTIONS: Ductal carcinoma-in-situ (DCIS)   Left breast, excisional biopsy with needle localization, 03/30/2018 (Dr. Alejandro). FINAL DIAGNOSIS: 1. A few scattered foci of intermediate grade ductal carcinoma in situ. 2. No invasive malignancy identified. 3. Changes of previous biopsy site including fat necrosis and cicatrix formation. 4. No ductal carcinoma in situ identified at the inked margins of surgical excision. AJCC stage: pTis (DCIS) pNX - ER reported, 04/05/2018: On same specimen 2 blocks - #1450-5: ER (+) 29%; #1450-3: ER (-) 0%  Office encounter with Dr. Alejandro on 04/30/2018: \"After a prolonged discussion lasting 60 minutes, we felt it was most appropriate that she undergo close observation and no surgical intervention at present. Her DCIS has not been sent for ER yet. We will make sure this is sent. We will also send for Prelude DX. She will call back in 2 weeks to discuss her ER/MO positivity or not. I will see her back in 5 months with a unilateral left mammogram.\"   Adjuvant Tamoxifen beginning 05/25/2018 through 07/13/2018.  Bilateral nipple sparing mastectomies, Dr. Alejandro. Followed by reconstruction by Dr. Mark, 07/13/2018. Final Diagnoses: A) Right Breast, Skin-Sparing Mastectomy: No evidence of malignancy. Mild benign fibrocystic changes. B) Left Breast, Skin-Sparing Mastectomy: Microscopic focus of residual ductal carcinoma in " situ, cribriform pattern, low nuclear grade. Greatest dimension of the residual ductal carcinoma in situ is 0.8 cm. Closest surgical margins is the posterior margin (deep margin) at 1.1 cm. No evidence of invasive carcinoma. Pathologic AJCC Classification: ypTis.     DIAGNOSTIC ABNORMALITIES: Infiltrating ductal carcinoma left breast   Mammography, 03/15/2005. A new area of developing nodularity with calcifications deep in the upper outer quadrant of the left breast. Biopsy was recommended.  Biopsy of left breast, 04/06/2006. Infiltrating ductal carcinoma, high-grade, strongly ER (82% positive), weakly MO (9% positive), and HER-2/analisa negative.    PREVIOUS INTERVENTIONS: Infiltrating ductal carcinoma left breast   Left breast lumpectomy with sentinel lymph node biopsies, 04/12/2005. A 2.2 cm tumor mass of infiltrating high-grade ductal carcinoma with a component of ductal carcinoma in situ, high-grade. 2 of 4 sentinel nodes positive.  Formal left axillary node dissection, 04/26/2005. Six additional nodes retrieved. All 6 negative for evidence of metastatic carcinoma.  Adjuvant Adriamycin, Cytoxan, and Taxotere. From 05/26/2005 through 08/04/2005. Six cycles completed.   Tamoxifen. From 08/11/2005 through 08/11/2010.  Radiation treatments to left breast, 09/12/2005 to 10/26/2005, 6040 cGy.        Problem List Items Addressed This Visit          Other    Ductal carcinoma in situ (DCIS) of left breast - Primary           Oncology/Hematology History   Ductal carcinoma in situ (DCIS) of left breast   12/15/2019 Initial Diagnosis    Ductal carcinoma in situ (DCIS) of left breast     12/15/2019 Cancer Staged    Staging form: Breast, AJCC 8th Edition  - Clinical stage from 12/15/2019: Stage 0 (cTis (DCIS), cN0, cM0, ER+, MO+, HER2-) - Signed by Tommy Richmond MD on 12/15/2019         PAST MEDICAL HISTORY:  ALLERGIES:  Allergies   Allergen Reactions    Adhesive Tape Rash     CURRENT MEDICATIONS:  Outpatient Encounter  Medications as of 4/23/2024   Medication Sig Dispense Refill    Calcium Carb-Cholecalciferol 500-400 MG-UNIT chewable tablet Chew.      fluticasone (FLONASE) 50 MCG/ACT nasal spray 2 sprays into the nostril(s) as directed by provider Daily.      glucosamine-chondroitin 500-400 MG capsule capsule Take  by mouth 3 (Three) Times a Day With Meals.      loratadine (CLARITIN) 10 MG tablet Take 1 tablet by mouth Daily.      Multiple Vitamins-Minerals (CENTRUM ADULT 50+ MULTIGUMMIES PO) Take  by mouth.      psyllium (METAMUCIL) 58.6 % packet Take 1 packet by mouth Daily.      ferrous sulfate 325 (65 FE) MG tablet Take 1 tablet by mouth Daily With Breakfast. (Patient not taking: Reported on 3/27/2024) 60 tablet 3    Omega-3 Fatty Acids (OMEGA 3 PO) Take  by mouth. (Patient not taking: Reported on 3/27/2024)      Sodium Fluoride 1.1 % cream Apply  to teeth. (Patient not taking: Reported on 3/27/2024)       No facility-administered encounter medications on file as of 4/23/2024.     ADULT ILLNESSES:   Dcis of the breast ( ICD-10:D05.12 ;Intraductal carcinoma in situ of left breast   Personal history of breast cancer ( ICD-10:Z85.3 ;Personal history of malignant neoplasm of breast   LINDA - Lorenzana's oesophagus ( ICD-10:K22.70 ;Lorenzana's esophagus without dysplasia   Depression ( ICD-10:F32.9 ;Major depressive disorder, single episode, unspecified   Hypercholesterolemia ( ICD-10:E78.00 ;Pure hypercholesterolemia, unspecified   Immunodeficiency disorder (disorder) ( ICD-10:D84.9 ;Immunodeficiency, unspecified   Osteoarthritis knee ( degenerative joint disease of knees; ICD-10:M17.0 ;Bilateral primary osteoarthritis of knee )   Syncope and collapse ( vasomotor instability; ICD-10:R55 ;Syncope and collapse )    SURGERIES:   Upper GI endoscopy, 02/17/2016   Knee surgery, repeat, 1984   Knee surgery, 1980   Dissection procedure: Formal left axillary node dissection, 04/26/2005   Breast implantation, bilateral, 11/09/2018, Dr. Mark    Colonoscopy, 2006   Endoscopy, 2011   Lumpectomy of breast, left, with sentinel node biopsy, 04/12/2005   Colonoscopy, 02/17/2016: Per Dr. Park's note: ASC lymphoid follicle, TV x2 polypoid lesions that were not polyps/hyperplastic change, diverticulosis   Cone biopsy of cervix, 11/2004   Endoscopy performed on 02/28/2019 at [facility]. - LA Grade B reflux esophagitis. - Esophageal mucosal changes secondary to established short-segment Lorenzana's disease. Biopsied. - Medium-sized hiatal hernia. - Normal stomach. - Normal examined duodenum.       ADULT ILLNESSES:  Patient Active Problem List   Diagnosis Code    History of Lorenzana's esophagus Z87.19    History of esophagitis Z87.19    History of breast cancer Z85.3    Ductal carcinoma in situ (DCIS) of left breast D05.12    Atypical ductal hyperplasia of left breast N60.92    History of thoracic surgery Z98.890    CHEK2-related breast cancer C50.919, Z15.02, Z15.89, Z15.09     SURGERIES:  Past Surgical History:   Procedure Laterality Date    ADENOIDECTOMY      BREAST AUGMENTATION Bilateral     BREAST BIOPSY Left 2005    BREAST LUMPECTOMY      2005    COLONOSCOPY  02/17/2016    Colon polyps; Diverticulosis; Repeat 3-5 years     COLONOSCOPY  02/26/2007    Dr. Matute-Repeat in 5 years     ENDOSCOPY  02/17/2016    Hiatal hernia, LA Grade C and Lorenzana's    ENDOSCOPY  10/03/2011    Hiatal hernia; Lorenzana's     ENDOSCOPY  02/22/2010    Dr. Matute-hiatal hernia; Lorenzana's     ENDOSCOPY  12/22/2009    Dr. Matute-Esophageal ulcers; gastritis; hiatal hernia-Repeat in 2 months     ENDOSCOPY N/A 2/28/2019    Procedure: ESOPHAGOGASTRODUODENOSCOPY WITH ANESTHESIA;  Surgeon: Sylvia Park MD;  Location: [facility] ENDOSCOPY;  Service: Gastroenterology    KNEE ARTHROPLASTY      MASTECTOMY Bilateral     TONSILLECTOMY       HEALTH MAINTENANCE ITEMS:  Health Maintenance Due   Topic Date Due    RSV Vaccine - Adults (1 - 1-dose 60+ series) Never done    ANNUAL WELLNESS  VISIT  Never done    ZOSTER VACCINE (3 of 3) 07/27/2019    Pneumococcal Vaccine 65+ (1 of 1 - PCV) Never done    COVID-19 Vaccine (5 - 2023-24 season) 09/01/2023    TDAP/TD VACCINES (2 - Td or Tdap) 03/02/2024    LIPID PANEL  03/07/2024       <no information>  Last Completed Colonoscopy            COLORECTAL CANCER SCREENING (COLONOSCOPY - Every 10 Years) Next due on 8/14/2033 08/14/2023  Outside Procedure: [place] COLONOSCOPY FLX DX W/COLLJ SPEC WHEN PFRMD    07/21/2021  Cologuard - Stool, Per Rectum    02/17/2020  COLONOSCOPY (Done - UTD)    03/08/2019  COLONOSCOPY (Done - UTD)    01/19/2018  SmartData: WORKFLOW - QUALITY MEASUREMENT - EXCLUSION REASONS - COLORECTAL CANCER SCREENING NOT PERFORMED FOR MEDICAL REASONS    Only the first 5 history entries have been loaded, but more history exists.                  Immunization History   Administered Date(s) Administered    COVID-19 (MODERNA) 1st,2nd,3rd Dose Monovalent 03/04/2021, 04/01/2021     Last Completed Mammogram       This patient has no relevant Health Maintenance data.              FAMILY HISTORY:  Family History   Problem Relation Age of Onset    Stroke Father     Leukemia Mother     No Known Problems Brother     No Known Problems Sister     No Known Problems Sister     Breast cancer Paternal Aunt 60    No Known Problems Daughter     No Known Problems Son     No Known Problems Maternal Grandmother     No Known Problems Paternal Grandmother     No Known Problems Maternal Aunt     Breast cancer Other 48    Ovarian cancer Neg Hx     Uterine cancer Neg Hx     Colon cancer Neg Hx     Melanoma Neg Hx     Prostate cancer Neg Hx     BRCA 1/2 Neg Hx     Endometrial cancer Neg Hx      SOCIAL HISTORY:  Social History     Socioeconomic History    Marital status: Single   Tobacco Use    Smoking status: Former    Smokeless tobacco: Never   Substance and Sexual Activity    Alcohol use: Yes     Comment: Rarely    Drug use: No    Sexual activity: Yes       REVIEW OF  "SYSTEMS:  This and that... \"My sinuses, I'm a ..\"  Constitutional:   The patient's appetite and energy are fairly good. \"I still feel pretty good.\" She manages all her ADLs and remains active.  Still volunteers for the [employer].  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 She has no weight changes (had gained 2 lb at her prior visit) since her last visit. She has no fevers, chills, or drenching night sweats. Her sleep habits are erratic.  Ear/Nose/Mouth/Throat:   She reports no ear pains, sinus symptoms, sore throat, nosebleeds, or sore tongue. She reports no headaches. She reports no hoarseness, change in voice quality.  Ocular:   She reports no eye pain, significant change in visual acuity, double vision, or blurry vision.  Respiratory:   She reports no chronic cough, significant shortness of breathing, phlegm production, or unexplained chest wall pain.  Cardiovascular:   She reports no exertional chest pain, chest pressure, or chest heaviness. She reports no claudication. She reports no palpitations or symptomatic orthostasis.  Gastrointestinal:   No dysphagia, nausea, vomiting, postprandial abdominal pain, bloating, cramping, change in bowel habits, or discoloration of the stool. She reports no rectal bleeding. She reports no constipation or diarrhea. Had EGD and c-scope, 8/14/23-large 9 cm hiatal hernia at the GE junction at 31 cm.  Abnormal mucosal changes suggestive of gastritis.  Gastric biopsies obtained from the antrum and body to rule out H. pylori infection.  If symptomatic consider hiatal hernia repair.  Terminal ileum appeared normal.  Colonic mucosa appeared normal throughout the entire examined colon.  Retroflexion in the rectum normal and revealed no further abnormalities.  Repeat colonoscopy in 5 years due to history of polyps.  Genitourinary:   She reports no urinary burning, frequency, dribbling, or discoloration. She reports no need to urinate frequently through the night. She " "reports no difficulty controlling her bladder.  GYN:   She reports no unexplained vaginal bleeding and no significant vaginal discharge.  Breasts:   She reports no breast discomfort since the implant placements. \"Still feels tight so I stretch and exercise.\"  Musculoskeletal:   She has no unexplained arthralgias, myalgias, or nighttime leg cramping.  Extremities:   She reports no trouble with fluid retention or significant leg swelling.  Heme/Lymphatic:   She reports no unexplained bleeding, bruising, petechial rash, or swollen glands.  Skin:   She reports no itching, rashes, or lesions which won't heal.  Neuro:   She reports no loss of consciousness, seizures, fainting spells, or dizziness. She reports no weakness of her face, arms, or legs. She reports no difficulty with speech. She has no tremors.  Psych:   She seems generally satisfied with life. She reports no depression. She reports no mood swings.      VITAL SIGNS: /76   Pulse 79   Temp 97.8 °F (36.6 °C) (Temporal)   Resp 18   Ht 162.6 cm (64\")   Wt 62.1 kg (136 lb 12.8 oz)   LMP 01/19/2010 (Approximate)   SpO2 99%   BMI 23.48 kg/m² Body surface area is 1.67 meters squared.  Pain Score    04/23/24 0836   PainSc: 0-No pain     I have reexamined the patient and the results are consistent with the previously documented exam. Tommy Richmond MD      PHYSICAL EXAMINATION:   General:   She is a pleasant, cooperative, well-developed, well-nourished, and modestly-kept middle aged female in no distress. She arrived in the exam room ambulatory. She appears to be her stated age. Her skin color is normal. ECOG PS = 0.  Head/Neck:   The patient is anicteric and atraumatic. The trachea is midline. The neck is supple without evidence of jugular venous distention or cervical adenopathy.  Eyes:   The pupils are equal, round, and reactive to light. The extraocular movements are full. There is no scleral jaundice or erythema.  Chest:   The respiratory efforts " are normal and unhindered. The chest is clear to auscultation. There are no wheezes, rhonchi, rales, or asymmetry of breath sounds.  Breasts: Chaperoned exam-Flor Quintanilla MA  The right breast is notable for an implant. No axillary adenopathy. The left breast is notable for an implant. No axillary adenopathy.  No supraclavicular adenopathy bilaterally.  No cervical adenopathy bilaterally.  Cardiovascular:   The patient has a regular cardiac rate and rhythm without murmurs, rubs, or gallops. The peripheral pulses are equal and full.  Extremities:   There is no evidence of cyanosis, clubbing, or edema.   Cutaneous:   She has no other overt rashes, disseminated lesions, purpura, or petechiae.  Lymphatics:   There is no evidence of adenopathy in the cervical, supraclavicular, axillary areas areas.   Neurologic:   The patient is alert, oriented, cooperative, and pleasant. She is appropriately conversant. She ambulated into the exam room without assistance and transferred from chair to exam table unaided. There is no overt dysfunction of the motor, sensory, or cerebellar systems.  Psych:   Mood and affect are appropriate for circumstance. Eye contact is appropriate.        LABS      ASSESSMENT:   1. Ductal carcinoma in situ (DCIS):   Stage: AJCC Stage: pTis (DCIS) pNX. ER, 04/05/2018: On same specimen 2 blocks - #1450-5: ER (+) 29%; #1450-3: ER (-) 0%.   Tumor [place]: Left breast, excisional biopsy with needle localization, 03/30/2018 (Dr. Alejandro). FINAL DIAGNOSIS: 1. A few scattered foci of intermediate grade ductal carcinoma in situ. 2. No invasive malignancy identified. 3. Changes of previous biopsy site including fat necrosis and cicatrix formation. 4. No ductal carcinoma in situ identified at the inked margins of surgical excision   Previously discussed that on biopsy of 03/30/2018, ER reported, 04/05/2018: On same specimen 2 blocks - #1450-5: ER (+) 29%; #1450-3: ER (-) 0%. The latter discussed and clarified with  Dr. Vinny Lomeli and Dr. Parisa Nagel of pathology who confirmed that these are from the same specimen but heterogeneously expressed ER.   Tumor Status following excisional biopsy, 03/30/2018 and bilateral nipple sparing mastectomies, 07/13/2018: ALFREDO  2. Personal history of carcinoma of the left breast, grade 3 infiltrating ductal carcinoma:   Stage: IIB (pT2 pN1 N0 G4) ER 82%, KY 9%, HER-2/analisa +2.   Baseline Node Status: 2 of 4 sentinel nodes positive.   Tumor [place]: No evidence of malignancy.   Tumor Status: Mammogram, bilateral, 01/30/2017 ([facility]) Comparison: 01/27/2016, 03/23/2015, 02/19/2013,01/21/2011. No mammographic evidence of malignancy. The patient should return for screening mammography in 12 months or sooner, if clinically indicated.   Prognosis: Good.  3. Mild normocytic anemia.  Evidence for iron deficiency.    --Resolved: Hgb 13.4; MCV 93.5, 4/16/24 (prior:  Hgb 11.3-13.7)  --6/20/23- iron 48, fe sat 9%, ferritin 11  --8/14/23- EGD and c-scope- Large 9 cm hiatal hernia at the GE junction at 31 cm.  Abnormal mucosal changes suggestive of gastritis.  Gastric biopsies obtained from the antrum and body to rule out H. pylori infection.  If symptomatic consider hiatal hernia repair.  Terminal ileum appeared normal.  Colonic mucosa appeared normal throughout the entire examined colon.  Retroflexion in the rectum normal and revealed no further abnormalities.  Repeat colonoscopy in 5 years due to history of polyps.  --Ferrous sulfate since 6/27/23  --4/16/24-iron 75, fe sat 18%, ferritin 42 (prior: 11)  4. Osteopenia  --7/17/23- DEXA scan-Osteopenia. Low bone mass.The bone density is between 1.0 and 2.5 standard deviations below the mean for a young adult patient. There is increased risk of fracture in this patient. Slightly increased lumbar spine bone density and slightly decreased left hip bone density compared with 2 years ago. Overall relatively stable bone density values.  5.   "Hypercholesterolemia. Diet controlled.  Followed by pcp (Dr. Rivera)  5. 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   7. History of depression.   8. Self-directed. Had previously declined scheduled repeat EGD per Dr. Park.  Has been missing follow-up appointments.  a. Letter from Dr. Park (patient missed EGD). On 11/20/2014, we called the patient to let her know that we received a letter from GI (Dr. Park) noting patient declined repeat EGD Had previously explained to the patient of the letter advising repeat EGD for Lorenzana's esophagus. Had strongly encouraged the patient to follow through. She had stated \"I don't think it is necessary and I have already called Dr. Park's office telling them so and I don't see the need in this.\"   b. Discussed again at her visit on 02/04/2015. She replies that \"I'm doing fine and I don't want to be pressured into this. I'll discuss this with somebody else before deciding.\"   c. Discussed again at her visit last 08/05/2015. She said, \"I don't want to be probed or prodded unless I need to be.\"   d. Upper GI endoscopy, 02/17/2016 ([facility]), - Small hiatus hernia - LA Grade C reflux esophagitis. Esophageal mucosal changes secondary to established short-segment Lorenzana's disease. Biopsied. Normal stomach. Normal examined duodenum.   e. Endoscopy performed on 02/28/2019 at [facility]. - LA Grade B reflux esophagitis. - Esophageal mucosal changes secondary to established short-segment Lorenzana's disease. Biopsied. - Medium-sized hiatal hernia. - Normal stomach. - Normal examined duodenum.  f.  EGD and c-scope, 8/14/23 (Dr. Ku)-large 9 cm hiatal hernia at the GE junction at 31 cm.  Abnormal mucosal changes suggestive of gastritis.  Gastric biopsies obtained from the antrum and body to rule out H. pylori infection.  If symptomatic consider hiatal hernia repair.  Terminal ileum appeared normal.  Colonic mucosa appeared normal throughout the entire examined colon. " " Retroflexion in the rectum normal and revealed no further abnormalities.  Repeat colonoscopy in 5 years due to history of polyps.    PLAN:  1.  Apprised of labs from 4/16/24 with Hgb 13.4 (prior: 14.4) otherwise normal CBC, glucose 105 otherwise normal CMP, iron 75, fe sat 18%, ferritin 42 (prior: 11); CEA 2.6 (1.98- 3.27), CA 27-29 32 (prior: 42.2).     2.  Apprised of DEXA scan 7/17/23 (above).  PCP following    3.   Review reports of EGD/c-scope from GI at [facility], rec 1423 (above).  4.   Diagnostic information previously discussed. Previously reviewed imaging studies (bilateral mammogram, 01/31/2018 and unilateral left diagnostic mammogram, 02/05/2018), path from left breast core biopsies at 1 o’clock position, 03/05/3018 showing focal atypical ductal hyperplasia (0.1 cm), then needle localization and excisional biopsy of left breast mass, 03/30/2018 and office encounters from Dr. Alejandro, most recently on 04/30/2018. \"After a prolonged discussion lasting 60 minutes, we felt it was most appropriate that she undergo close observation and no surgical intervention at present. Her DCIS has not been sent for ER yet. We will make sure this is sent. We will also send for Prelude DX. She will call back in 2 weeks to discuss her ER/CO positivity or not. I will see her back in 5 months with a unilateral left mammogram.\" I discussed the options (see #5 following). Underwent bilateral mastectomies on 07/13/2018 (above).   5.  Ductal carcinoma in situ (DCIS) previously discussed. I had noted that many, but not all cases of DCIS progress to invasive carcinoma within a woman's lifetime. The available therapies were reviewed:  a) Mastectomy is curative 98-99% of the time (Given the information available, I feel that this is the procedure the patient will benefit the most from, and the procedure she favors anyway).   b) Localized DCIS may be treated with breast sparing surgery and irradiation (recurrence rate = 9-17%).   c) " Excision alone may be considered in those with less than 1-2 cm low grade lesions.   d) Tamoxifen should be considered to reduce (from 13% to 8%) the risk of ipsilateral breast recurrence after breast sparing surgery, and to reduce the risk of contralateral breast cancer in all women.   6.  Previously acknowledged her self-directed approach.   7.  Rx: Ferrous sulfate 325 po qd # 30 x 4 RF   8.  Continue management per primary care and other specialists.   9.  Return to office in 24 weeks - does not want to extend beyond this period - with pre-office CBC with differential, iron, fe sat, ferritin, CEA, CA27-29, and CMP.       I spent ~ 34 minutes caring for Evelia on this date of service. This time includes time spent by me in the following activities: preparing for the visit, reviewing tests, performing a medically appropriate examination and/or evaluation, counseling and educating the patient/family/caregiver, ordering medications, tests, or procedures and documenting information in the medical record      cc: MD Frandy Cummings MD Pamela Reed, MD

## 2024-04-23 ENCOUNTER — OFFICE VISIT (OUTPATIENT)
Dept: ONCOLOGY | Facility: CLINIC | Age: 69
End: 2024-04-23
Payer: MEDICARE

## 2024-04-23 VITALS
OXYGEN SATURATION: 99 % | WEIGHT: 136.8 LBS | BODY MASS INDEX: 23.35 KG/M2 | HEART RATE: 79 BPM | DIASTOLIC BLOOD PRESSURE: 76 MMHG | HEIGHT: 64 IN | RESPIRATION RATE: 18 BRPM | SYSTOLIC BLOOD PRESSURE: 130 MMHG | TEMPERATURE: 97.8 F

## 2024-04-23 DIAGNOSIS — R97.8 OTHER ABNORMAL TUMOR MARKERS: ICD-10-CM

## 2024-04-23 DIAGNOSIS — D05.12 DUCTAL CARCINOMA IN SITU (DCIS) OF LEFT BREAST: Primary | ICD-10-CM

## 2024-04-23 PROCEDURE — 99214 OFFICE O/P EST MOD 30 MIN: CPT | Performed by: INTERNAL MEDICINE

## 2024-04-23 PROCEDURE — 1126F AMNT PAIN NOTED NONE PRSNT: CPT | Performed by: INTERNAL MEDICINE

## 2024-04-23 RX ORDER — FERROUS SULFATE 325(65) MG
325 TABLET ORAL DAILY
Qty: 30 TABLET | Refills: 5 | Status: SHIPPED | OUTPATIENT
Start: 2024-04-23

## 2024-04-24 ENCOUNTER — TELEPHONE (OUTPATIENT)
Dept: ONCOLOGY | Facility: CLINIC | Age: 69
End: 2024-04-24

## 2024-04-24 NOTE — TELEPHONE ENCOUNTER
Caller: Evelia Kerns    Relationship: Self    Best call back number: [phone]     What is the best time to reach you: ANYTIME    Who are you requesting to speak with (clinical staff, provider,  specific staff member): CLINICAL        What was the call regarding: PATIENT WANTS DR SILVA TO KNOW SHE REQUESTED THE RESULTS OF HER ENDO AND COLONOSCOPY TO BE SENT TO DR SILVA FROM THE MEDICAL RECORDS DEPT AT [facility].

## 2024-05-03 ENCOUNTER — TELEPHONE (OUTPATIENT)
Dept: ONCOLOGY | Facility: CLINIC | Age: 69
End: 2024-05-03
Payer: MEDICARE

## 2024-05-03 NOTE — TELEPHONE ENCOUNTER
Pt will discuss with her pharmacist so she can get her an OTC one that is green instead of red. She v/u

## 2024-10-14 NOTE — PROGRESS NOTES
[facility] GROUP HEMATOLOGY AND ONCOLOGY  [address]  [place] [address] [phone]    Patient Name: Evelia Kerns  Encounter Date: 10/23/2024  YOB: 1955  Patient Number: [id number]     REASON FOR VISIT:  Ms. Evelia Kerns is a 69-year-old female who returns in follow-up of carcinoma of the left breast, stage: IIB (pT2 pN1 N0 G4) ER 82%, NC 9%, HER-2/analisa +2.  She is seen ~ 230 months following the completion of adjuvant ACT (Adriamycin/ Cytoxan/Taxotere) chemotherapy. She completed 5 years of adjuvant Tamoxifen on 08/11/2010. 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 Alejandro. She received adjuvant Tamoxifen from 05/25/2018 through 07/13/2018 at which point she underwent bilateral nipple sparing mastectomies. She has since undergone placement of permanent breast implants bilaterally on 11/09/2018. The patient is here alone (usually with her spouse, Juvenla).     I have reviewed the HPI and verified with the patient the accuracy of it. No changes to interval history since the information was documented. Tommy Richmond MD 10/23/24      DIAGNOSTIC ABNORMALITIES: Ductal carcinoma-in-situ (DCIS)   Bilateral mammogram, 01/31/2018, [facility]. Comparison: 01/30/2017, 01/27/2016, 03/23/2015. Impression: Left breast calcifications for which additional imaging is recommended including CC and lateral magnification and 3D full lateral views. BI-RADS Category 0. No mammographic evidence of right breast malignancy.   Unilateral left diagnostic mammogram with CAD, 02/05/2018, [facility]. Comparison to screening mammogram of 01/31/2018. Impression: Indeterminate left breast calcifications for which stereotactic guided biopsy is recommended. BI-RADS Category 4.   Left breast core biopsies at 1  "o’clock position, 03/05/2018 showing focal atypical ductal hyperplasia (0.1 cm).  Diagnosis: Breast, left needle localized excisional biopsy (KIS- 18,- 1450, 13 slides), 04/05/2018: Intermediate grade ductal carcinoma in situ, solid type; scar and radiation change; previous biopsy.  ER reported, 04/05/2018: On same specimen 2 blocks - #1450-5: ER (+) 29%; #1450-3: ER (-) 0%. The latter discussed and clarified with Dr. Vinny Lomeli and Dr. Parisa Nagel of pathology who confirmed that these are from the same specimen but heterogeneously expressed ER.    PREVIOUS INTERVENTIONS: Ductal carcinoma-in-situ (DCIS)   Left breast, excisional biopsy with needle localization, 03/30/2018 (Dr. Alejandro). FINAL DIAGNOSIS: 1. A few scattered foci of intermediate grade ductal carcinoma in situ. 2. No invasive malignancy identified. 3. Changes of previous biopsy site including fat necrosis and cicatrix formation. 4. No ductal carcinoma in situ identified at the inked margins of surgical excision. AJCC stage: pTis (DCIS) pNX - ER reported, 04/05/2018: On same specimen 2 blocks - #1450-5: ER (+) 29%; #1450-3: ER (-) 0%  Office encounter with Dr. Alejandro on 04/30/2018: \"After a prolonged discussion lasting 60 minutes, we felt it was most appropriate that she undergo close observation and no surgical intervention at present. Her DCIS has not been sent for ER yet. We will make sure this is sent. We will also send for Prelude DX. She will call back in 2 weeks to discuss her ER/SD positivity or not. I will see her back in 5 months with a unilateral left mammogram.\"   Adjuvant Tamoxifen beginning 05/25/2018 through 07/13/2018.  Bilateral nipple sparing mastectomies, Dr. Alejandro. Followed by reconstruction by Dr. Mark, 07/13/2018. Final Diagnoses: A) Right Breast, Skin-Sparing Mastectomy: No evidence of malignancy. Mild benign fibrocystic changes. B) Left Breast, Skin-Sparing Mastectomy: Microscopic focus of residual ductal carcinoma in situ, " cribriform pattern, low nuclear grade. Greatest dimension of the residual ductal carcinoma in situ is 0.8 cm. Closest surgical margins is the posterior margin (deep margin) at 1.1 cm. No evidence of invasive carcinoma. Pathologic AJCC Classification: ypTis.     DIAGNOSTIC ABNORMALITIES: Infiltrating ductal carcinoma left breast   Mammography, 03/15/2005. A new area of developing nodularity with calcifications deep in the upper outer quadrant of the left breast. Biopsy was recommended.  Biopsy of left breast, 04/06/2006. Infiltrating ductal carcinoma, high-grade, strongly ER (82% positive), weakly LA (9% positive), and HER-2/analisa negative.    PREVIOUS INTERVENTIONS: Infiltrating ductal carcinoma left breast   Left breast lumpectomy with sentinel lymph node biopsies, 04/12/2005. A 2.2 cm tumor mass of infiltrating high-grade ductal carcinoma with a component of ductal carcinoma in situ, high-grade. 2 of 4 sentinel nodes positive.  Formal left axillary node dissection, 04/26/2005. Six additional nodes retrieved. All 6 negative for evidence of metastatic carcinoma.  Adjuvant Adriamycin, Cytoxan, and Taxotere. From 05/26/2005 through 08/04/2005. Six cycles completed.   Tamoxifen. From 08/11/2005 through 08/11/2010.  Radiation treatments to left breast, 09/12/2005 to 10/26/2005, 6040 cGy.        Problem List Items Addressed This Visit          Other    Ductal carcinoma in situ (DCIS) of left breast - Primary             Oncology/Hematology History   Ductal carcinoma in situ (DCIS) of left breast   12/15/2019 Initial Diagnosis    Ductal carcinoma in situ (DCIS) of left breast     12/15/2019 Cancer Staged    Staging form: Breast, AJCC 8th Edition  - Clinical stage from 12/15/2019: Stage 0 (cTis (DCIS), cN0, cM0, ER+, LA+, HER2-) - Signed by Tommy Richmond MD on 12/15/2019         PAST MEDICAL HISTORY:  ALLERGIES:  Allergies   Allergen Reactions    Adhesive Tape Rash     CURRENT MEDICATIONS:  Outpatient Encounter  Medications as of 10/23/2024   Medication Sig Dispense Refill    Calcium Carb-Cholecalciferol 500-400 MG-UNIT chewable tablet Chew.      fluticasone (FLONASE) 50 MCG/ACT nasal spray Administer 2 sprays into the nostril(s) as directed by provider Daily.      glucosamine-chondroitin 500-400 MG capsule capsule Take  by mouth 3 (Three) Times a Day With Meals.      loratadine (CLARITIN) 10 MG tablet Take 1 tablet by mouth Daily.      Multiple Vitamins-Minerals (CENTRUM ADULT 50+ MULTIGUMMIES PO) Take  by mouth.      psyllium (METAMUCIL) 58.6 % packet Take 1 packet by mouth Daily.      Sodium Fluoride 1.1 % cream Apply  to teeth.      [DISCONTINUED] ferrous sulfate 325 (65 FE) MG tablet Take 1 tablet by mouth Daily With Breakfast. (Patient not taking: Reported on 10/23/2024) 60 tablet 3    [DISCONTINUED] ferrous sulfate 325 (65 FE) MG tablet Take 1 tablet by mouth Daily. (Patient not taking: Reported on 10/23/2024) 30 tablet 5     No facility-administered encounter medications on file as of 10/23/2024.     ADULT ILLNESSES:   Dcis of the breast ( ICD-10:D05.12 ;Intraductal carcinoma in situ of left breast   Personal history of breast cancer ( ICD-10:Z85.3 ;Personal history of malignant neoplasm of breast   LINDA - Lorenzana's oesophagus ( ICD-10:K22.70 ;Lorenzana's esophagus without dysplasia   Depression ( ICD-10:F32.9 ;Major depressive disorder, single episode, unspecified   Hypercholesterolemia ( ICD-10:E78.00 ;Pure hypercholesterolemia, unspecified   Immunodeficiency disorder (disorder) ( ICD-10:D84.9 ;Immunodeficiency, unspecified   Osteoarthritis knee ( degenerative joint disease of knees; ICD-10:M17.0 ;Bilateral primary osteoarthritis of knee )   Syncope and collapse ( vasomotor instability; ICD-10:R55 ;Syncope and collapse )    SURGERIES:   Upper GI endoscopy, 02/17/2016   Knee surgery, repeat, 1984   Knee surgery, 1980   Dissection procedure: Formal left axillary node dissection, 04/26/2005   Breast implantation,  bilateral, 11/09/2018, Dr. Mark   Colonoscopy, 2006   Endoscopy, 2011   Lumpectomy of breast, left, with sentinel node biopsy, 04/12/2005   Colonoscopy, 02/17/2016: Per Dr. Park's note: ASC lymphoid follicle, TV x2 polypoid lesions that were not polyps/hyperplastic change, diverticulosis   Cone biopsy of cervix, 11/2004   Endoscopy performed on 02/28/2019 at [facility]. - LA Grade B reflux esophagitis. - Esophageal mucosal changes secondary to established short-segment Lorenzana's disease. Biopsied. - Medium-sized hiatal hernia. - Normal stomach. - Normal examined duodenum.       ADULT ILLNESSES:  Patient Active Problem List   Diagnosis Code    History of Lorenzana's esophagus Z87.19    History of esophagitis Z87.19    History of breast cancer Z85.3    Ductal carcinoma in situ (DCIS) of left breast D05.12    Atypical ductal hyperplasia of left breast N60.92    History of thoracic surgery Z98.890    CHEK2-related breast cancer C50.919, Z15.02, Z15.89, Z15.09     SURGERIES:  Past Surgical History:   Procedure Laterality Date    ADENOIDECTOMY      BREAST AUGMENTATION Bilateral     BREAST BIOPSY Left 2005    BREAST LUMPECTOMY      2005    COLONOSCOPY  02/17/2016    Colon polyps; Diverticulosis; Repeat 3-5 years     COLONOSCOPY  02/26/2007    Dr. Matute-Repeat in 5 years     ENDOSCOPY  02/17/2016    Hiatal hernia, LA Grade C and Lorenzana's    ENDOSCOPY  10/03/2011    Hiatal hernia; Lorenzana's     ENDOSCOPY  02/22/2010    Dr. Matute-hiatal hernia; Lorenzana's     ENDOSCOPY  12/22/2009    Dr. Matute-Esophageal ulcers; gastritis; hiatal hernia-Repeat in 2 months     ENDOSCOPY N/A 2/28/2019    Procedure: ESOPHAGOGASTRODUODENOSCOPY WITH ANESTHESIA;  Surgeon: Sylvia Park MD;  Location: [facility] ENDOSCOPY;  Service: Gastroenterology    KNEE ARTHROPLASTY      MASTECTOMY Bilateral     TONSILLECTOMY       HEALTH MAINTENANCE ITEMS:  Health Maintenance Due   Topic Date Due    ANNUAL WELLNESS VISIT  Never done    ZOSTER  "VACCINE (3 of 3) 07/27/2019    Pneumococcal Vaccine 65+ (1 of 1 - PCV) Never done    TDAP/TD VACCINES (2 - Td or Tdap) 03/02/2024    LIPID PANEL  03/07/2024    INFLUENZA VACCINE  08/01/2024    COVID-19 Vaccine (5 - 2023-24 season) 09/01/2024       <no information>  Last Completed Colonoscopy            COLORECTAL CANCER SCREENING (COLONOSCOPY - Every 10 Years) Next due on 8/14/2033 08/14/2023  Colonoscopy, Scan    07/21/2021  Cologuard - Stool, Per Rectum    02/17/2020  COLONOSCOPY (Done - UTD)    03/08/2019  COLONOSCOPY (Done - UTD)    01/19/2018  SmartData: WORKFLOW - QUALITY MEASUREMENT - EXCLUSION REASONS - COLORECTAL CANCER SCREENING NOT PERFORMED FOR MEDICAL REASONS    Only the first 5 history entries have been loaded, but more history exists.                  Immunization History   Administered Date(s) Administered    COVID-19 (MODERNA) 1st,2nd,3rd Dose Monovalent 03/04/2021, 04/01/2021     Last Completed Mammogram       This patient has no relevant Health Maintenance data.              FAMILY HISTORY:  Family History   Problem Relation Age of Onset    Stroke Father     Leukemia Mother     No Known Problems Brother     No Known Problems Sister     No Known Problems Sister     Breast cancer Paternal Aunt 60    No Known Problems Daughter     No Known Problems Son     No Known Problems Maternal Grandmother     No Known Problems Paternal Grandmother     No Known Problems Maternal Aunt     Breast cancer Other 48    Ovarian cancer Neg Hx     Uterine cancer Neg Hx     Colon cancer Neg Hx     Melanoma Neg Hx     Prostate cancer Neg Hx     BRCA 1/2 Neg Hx     Endometrial cancer Neg Hx      SOCIAL HISTORY:  Social History     Socioeconomic History    Marital status: Single   Tobacco Use    Smoking status: Former    Smokeless tobacco: Never   Substance and Sexual Activity    Alcohol use: Yes     Comment: Rarely    Drug use: No    Sexual activity: Yes       REVIEW OF SYSTEMS:  This and that... \"I do " "ok..\"  Constitutional:   The patient's appetite and energy are fairly good. \"I still feel pretty good.\" She manages all her ADLs and remains active.  Still volunteers for the [employer].  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 She has lost 5 lb (no weight changes at her prior visit) since her last visit. She has no fevers, chills, or drenching night sweats. Her sleep habits are erratic.  Ear/Nose/Mouth/Throat:   She reports no ear pains, sinus symptoms, sore throat, nosebleeds, or sore tongue. She reports no headaches. She reports no hoarseness, change in voice quality.  Ocular:   She reports no eye pain, significant change in visual acuity, double vision, or blurry vision.  Respiratory:   She reports no chronic cough, significant shortness of breathing, phlegm production, or unexplained chest wall pain.  Cardiovascular:   She reports no exertional chest pain, chest pressure, or chest heaviness. She reports no claudication. She reports no palpitations or symptomatic orthostasis.  Gastrointestinal:   No dysphagia, nausea, vomiting, postprandial abdominal pain, bloating, cramping, change in bowel habits, or discoloration of the stool. She reports no rectal bleeding. She reports no constipation or diarrhea. Had EGD and c-scope, 8/14/23-large 9 cm hiatal hernia at the GE junction at 31 cm.  Abnormal mucosal changes suggestive of gastritis.  Gastric biopsies obtained from the antrum and body to rule out H. pylori infection.  If symptomatic consider hiatal hernia repair.  Terminal ileum appeared normal.  Colonic mucosa appeared normal throughout the entire examined colon.  Retroflexion in the rectum normal and revealed no further abnormalities.  Repeat colonoscopy in 5 years due to history of polyps.  Genitourinary:   She reports no urinary burning, frequency, dribbling, or discoloration. She reports no need to urinate frequently through the night. She reports no difficulty controlling her bladder.  GYN: " "  She reports no unexplained vaginal bleeding and no significant vaginal discharge.  Breasts:   She reports no breast discomfort since the implant placements.   Musculoskeletal:   She has no unexplained arthralgias, myalgias, or nighttime leg cramping.  Extremities:   She reports no trouble with fluid retention or significant leg swelling.  Heme/Lymphatic:   She reports no unexplained bleeding, bruising, petechial rash, or swollen glands.  Skin:   She reports no itching, rashes, or lesions which won't heal.  Neuro:   She reports no loss of consciousness, seizures, fainting spells, or dizziness. She reports no weakness of her face, arms, or legs. She reports no difficulty with speech. She has no tremors.  Psych:   She seems generally satisfied with life. She reports no depression. She reports no mood swings.      VITAL SIGNS: /74   Pulse 64   Temp 97.6 °F (36.4 °C) (Temporal)   Resp 18   Ht 162.6 cm (64\")   Wt 59.8 kg (131 lb 14.4 oz)   LMP 01/19/2010 (Approximate)   SpO2 97%   BMI 22.64 kg/m² Body surface area is 1.64 meters squared.  Pain Score    10/23/24 0838   PainSc: 0-No pain         PHYSICAL EXAMINATION:   General:   She is a pleasant, cooperative, well-developed, well-nourished, and modestly-kept middle aged female in no distress. She arrived in the exam room ambulatory. She appears to be her stated age. Her skin color is normal. ECOG PS = 0.  Head/Neck:   The patient is anicteric and atraumatic. The trachea is midline. The neck is supple without evidence of jugular venous distention or cervical adenopathy.  Eyes:   The pupils are equal, round, and reactive to light. The extraocular movements are full. There is no scleral jaundice or erythema.  Chest:   The respiratory efforts are normal and unhindered. The chest is clear to auscultation. There are no wheezes, rhonchi, rales, or asymmetry of breath sounds.  Breasts: Chaperoned exam-Angela Hanks RN  The right breast is notable for an implant. No " axillary adenopathy. The left breast is notable for an implant. No axillary adenopathy.  No supraclavicular adenopathy bilaterally.  No cervical adenopathy bilaterally.  Cardiovascular:   The patient has a regular cardiac rate and rhythm without murmurs, rubs, or gallops. The peripheral pulses are equal and full.  Extremities:   There is no evidence of cyanosis, clubbing, or edema.   Cutaneous:   She has no other overt rashes, disseminated lesions, purpura, or petechiae.  Lymphatics:   There is no evidence of adenopathy in the cervical, supraclavicular, axillary areas areas.   Neurologic:   The patient is alert, oriented, cooperative, and pleasant. She is appropriately conversant. She ambulated into the exam room without assistance and transferred from chair to exam table unaided. There is no overt dysfunction of the motor, sensory, or cerebellar systems.  Psych:   Mood and affect are appropriate for circumstance. Eye contact is appropriate.        LABS      ASSESSMENT:   1. Ductal carcinoma in situ (DCIS):   Stage: AJCC Stage: pTis (DCIS) pNX. ER, 04/05/2018: On same specimen 2 blocks - #1450-5: ER (+) 29%; #1450-3: ER (-) 0%.   Tumor [place]: Left breast, excisional biopsy with needle localization, 03/30/2018 (Dr. Alejandro). FINAL DIAGNOSIS: 1. A few scattered foci of intermediate grade ductal carcinoma in situ. 2. No invasive malignancy identified. 3. Changes of previous biopsy site including fat necrosis and cicatrix formation. 4. No ductal carcinoma in situ identified at the inked margins of surgical excision   Previously discussed that on biopsy of 03/30/2018, ER reported, 04/05/2018: On same specimen 2 blocks - #1450-5: ER (+) 29%; #1450-3: ER (-) 0%. The latter discussed and clarified with Dr. Vinny Lomeli and Dr. Parisa Nagel of pathology who confirmed that these are from the same specimen but heterogeneously expressed ER.   Tumor Status following excisional biopsy, 03/30/2018 and bilateral nipple sparing  mastectomies, 07/13/2018: ALFREDO  2. Personal history of carcinoma of the left breast, grade 3 infiltrating ductal carcinoma:   Stage: IIB (pT2 pN1 N0 G4) ER 82%, AR 9%, HER-2/analisa +2.   Baseline Node Status: 2 of 4 sentinel nodes positive.   Tumor [place]: No evidence of malignancy.   Tumor Status: Mammogram, bilateral, 01/30/2017 ([facility]) Comparison: 01/27/2016, 03/23/2015, 02/19/2013,01/21/2011. No mammographic evidence of malignancy. The patient should return for screening mammography in 12 months or sooner, if clinically indicated.   Prognosis: Good.  3. Mild normocytic anemia.  Evidence for iron deficiency.    --Resolved: Hgb 13.4; MCV 93.5, 4/16/24 (prior:  Hgb 11.3-13.7)  --6/20/23- iron 48, fe sat 9%, ferritin 11  --8/14/23- EGD and c-scope- Large 9 cm hiatal hernia at the GE junction at 31 cm.  Abnormal mucosal changes suggestive of gastritis.  Gastric biopsies obtained from the antrum and body to rule out H. pylori infection.  If symptomatic consider hiatal hernia repair.  Terminal ileum appeared normal.  Colonic mucosa appeared normal throughout the entire examined colon.  Retroflexion in the rectum normal and revealed no further abnormalities.  Repeat colonoscopy in 5 years due to history of polyps.  --Ferrous sulfate since 6/27/23  --4/16/24-iron 75, fe sat 18%, ferritin 42 (prior: 11)  4. Osteopenia  --7/17/23- DEXA scan-Osteopenia. Low bone mass.The bone density is between 1.0 and 2.5 standard deviations below the mean for a young adult patient. There is increased risk of fracture in this patient. Slightly increased lumbar spine bone density and slightly decreased left hip bone density compared with 2 years ago. Overall relatively stable bone density values.  5.  Hypercholesterolemia. Diet controlled.  Followed by pcp (Dr. Rivera)  5. 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   7. History of depression.   8. Self-directed. Had previously declined scheduled repeat EGD per Dr. Park.  " Has been missing follow-up appointments.  a. Letter from Dr. Park (patient missed EGD). On 11/20/2014, we called the patient to let her know that we received a letter from GI (Dr. Park) noting patient declined repeat EGD Had previously explained to the patient of the letter advising repeat EGD for Lorenzana's esophagus. Had strongly encouraged the patient to follow through. She had stated \"I don't think it is necessary and I have already called Dr. Park's office telling them so and I don't see the need in this.\"   b. Discussed again at her visit on 02/04/2015. She replies that \"I'm doing fine and I don't want to be pressured into this. I'll discuss this with somebody else before deciding.\"   c. Discussed again at her visit last 08/05/2015. She said, \"I don't want to be probed or prodded unless I need to be.\"   d. Upper GI endoscopy, 02/17/2016 ([facility]), - Small hiatus hernia - LA Grade C reflux esophagitis. Esophageal mucosal changes secondary to established short-segment Lorenzana's disease. Biopsied. Normal stomach. Normal examined duodenum.   e. Endoscopy performed on 02/28/2019 at [facility]. - LA Grade B reflux esophagitis. - Esophageal mucosal changes secondary to established short-segment Lorenzana's disease. Biopsied. - Medium-sized hiatal hernia. - Normal stomach. - Normal examined duodenum.  f.  EGD and c-scope, 8/14/23 (Dr. Ku)-large 9 cm hiatal hernia at the GE junction at 31 cm.  Abnormal mucosal changes suggestive of gastritis.  Gastric biopsies obtained from the antrum and body to rule out H. pylori infection.  If symptomatic consider hiatal hernia repair.  Terminal ileum appeared normal.  Colonic mucosa appeared normal throughout the entire examined colon.  Retroflexion in the rectum normal and revealed no further abnormalities.  Repeat colonoscopy in 5 years due to history of polyps.    PLAN:  1.  Apprised of labs from 4/16/24 -10/16/24 with Hgb 14.2 otherwise normal CBC, glucose " "110, aphos 123 otherwise normal CMP, iron 68, fe sat 17%, ferritin 88 (prior: 32); CEA 2.64 (1.98- 3.27), CA 27-29 18 (prior: 42.2).     2.  Diagnostic information previously discussed. Previously reviewed imaging studies (bilateral mammogram, 01/31/2018 and unilateral left diagnostic mammogram, 02/05/2018), path from left breast core biopsies at 1 o’clock position, 03/05/3018 showing focal atypical ductal hyperplasia (0.1 cm), then needle localization and excisional biopsy of left breast mass, 03/30/2018 and office encounters from Dr. Alejandro, most recently on 04/30/2018. \"After a prolonged discussion lasting 60 minutes, we felt it was most appropriate that she undergo close observation and no surgical intervention at present. Her DCIS has not been sent for ER yet. We will make sure this is sent. We will also send for Prelude DX. She will call back in 2 weeks to discuss her ER/KS positivity or not. I will see her back in 5 months with a unilateral left mammogram.\" I discussed the options (see #3 following). Underwent bilateral mastectomies on 07/13/2018 (above).   3.  Ductal carcinoma in situ (DCIS) previously discussed. I had noted that many, but not all cases of DCIS progress to invasive carcinoma within a woman's lifetime. The available therapies were reviewed:  a) Mastectomy is curative 98-99% of the time (Given the information available, I feel that this is the procedure the patient will benefit the most from, and the procedure she favors anyway).   b) Localized DCIS may be treated with breast sparing surgery and irradiation (recurrence rate = 9-17%).   c) Excision alone may be considered in those with less than 1-2 cm low grade lesions.   d) Tamoxifen should be considered to reduce (from 13% to 8%) the risk of ipsilateral breast recurrence after breast sparing surgery, and to reduce the risk of contralateral breast cancer in all women.   4.  Previously acknowledged her self-directed approach.     5.  Rx: " Ferrous sulfate 325 po qod # 30 x 4 RF     6.  Continue management per primary care and other specialists.   7.  Return to office in 24 weeks - does not want to extend beyond this period - with pre-office CBC with differential, iron, fe sat, ferritin, CEA, CA27-29, and CMP.       I spent ~ 33 minutes caring for Evelia on this date of service. This time includes time spent by me in the following activities: preparing for the visit, reviewing tests, performing a medically appropriate examination and/or evaluation, counseling and educating the patient/family/caregiver, ordering medications, tests, or procedures and documenting information in the medical record      cc: MD Frandy Cummings MD Pamela Reed, MD

## 2024-10-16 ENCOUNTER — LAB (OUTPATIENT)
Dept: LAB | Facility: HOSPITAL | Age: 69
End: 2024-10-16
Payer: MEDICARE

## 2024-10-16 DIAGNOSIS — R97.8 OTHER ABNORMAL TUMOR MARKERS: ICD-10-CM

## 2024-10-16 DIAGNOSIS — D05.12 DUCTAL CARCINOMA IN SITU (DCIS) OF LEFT BREAST: ICD-10-CM

## 2024-10-16 LAB
ALBUMIN SERPL-MCNC: 4.3 G/DL (ref 3.5–5.2)
ALBUMIN/GLOB SERPL: 1.7 G/DL
ALP SERPL-CCNC: 123 U/L (ref 39–117)
ALT SERPL W P-5'-P-CCNC: 19 U/L (ref 1–33)
ANION GAP SERPL CALCULATED.3IONS-SCNC: 9 MMOL/L (ref 5–15)
AST SERPL-CCNC: 21 U/L (ref 1–32)
BASOPHILS # BLD AUTO: 0.08 10*3/MM3 (ref 0–0.2)
BASOPHILS NFR BLD AUTO: 1.1 % (ref 0–1.5)
BILIRUB SERPL-MCNC: 0.4 MG/DL (ref 0–1.2)
BUN SERPL-MCNC: 16 MG/DL (ref 8–23)
BUN/CREAT SERPL: 21.1 (ref 7–25)
CALCIUM SPEC-SCNC: 9.4 MG/DL (ref 8.6–10.5)
CEA SERPL-MCNC: 2.64 NG/ML
CHLORIDE SERPL-SCNC: 102 MMOL/L (ref 98–107)
CO2 SERPL-SCNC: 27 MMOL/L (ref 22–29)
CREAT SERPL-MCNC: 0.76 MG/DL (ref 0.57–1)
DEPRECATED RDW RBC AUTO: 42.1 FL (ref 37–54)
EGFRCR SERPLBLD CKD-EPI 2021: 85.5 ML/MIN/1.73
EOSINOPHIL # BLD AUTO: 0.1 10*3/MM3 (ref 0–0.4)
EOSINOPHIL NFR BLD AUTO: 1.4 % (ref 0.3–6.2)
ERYTHROCYTE [DISTWIDTH] IN BLOOD BY AUTOMATED COUNT: 12.2 % (ref 12.3–15.4)
FERRITIN SERPL-MCNC: 88.43 NG/ML (ref 13–150)
GLOBULIN UR ELPH-MCNC: 2.6 GM/DL
GLUCOSE SERPL-MCNC: 110 MG/DL (ref 65–99)
HCT VFR BLD AUTO: 43.1 % (ref 34–46.6)
HGB BLD-MCNC: 14.2 G/DL (ref 12–15.9)
IMM GRANULOCYTES # BLD AUTO: 0.01 10*3/MM3 (ref 0–0.05)
IMM GRANULOCYTES NFR BLD AUTO: 0.1 % (ref 0–0.5)
IRON 24H UR-MRATE: 68 MCG/DL (ref 37–145)
IRON SATN MFR SERPL: 17 % (ref 20–50)
LYMPHOCYTES # BLD AUTO: 1.48 10*3/MM3 (ref 0.7–3.1)
LYMPHOCYTES NFR BLD AUTO: 20.6 % (ref 19.6–45.3)
MCH RBC QN AUTO: 30.8 PG (ref 26.6–33)
MCHC RBC AUTO-ENTMCNC: 32.9 G/DL (ref 31.5–35.7)
MCV RBC AUTO: 93.5 FL (ref 79–97)
MONOCYTES # BLD AUTO: 0.62 10*3/MM3 (ref 0.1–0.9)
MONOCYTES NFR BLD AUTO: 8.6 % (ref 5–12)
NEUTROPHILS NFR BLD AUTO: 4.9 10*3/MM3 (ref 1.7–7)
NEUTROPHILS NFR BLD AUTO: 68.2 % (ref 42.7–76)
NRBC BLD AUTO-RTO: 0 /100 WBC (ref 0–0.2)
PLATELET # BLD AUTO: 224 10*3/MM3 (ref 140–450)
PMV BLD AUTO: 11.7 FL (ref 6–12)
POTASSIUM SERPL-SCNC: 4.2 MMOL/L (ref 3.5–5.2)
PROT SERPL-MCNC: 6.9 G/DL (ref 6–8.5)
RBC # BLD AUTO: 4.61 10*6/MM3 (ref 3.77–5.28)
SODIUM SERPL-SCNC: 138 MMOL/L (ref 136–145)
TIBC SERPL-MCNC: 392 MCG/DL (ref 298–536)
TRANSFERRIN SERPL-MCNC: 263 MG/DL (ref 200–360)
WBC NRBC COR # BLD AUTO: 7.19 10*3/MM3 (ref 3.4–10.8)

## 2024-10-16 PROCEDURE — 84466 ASSAY OF TRANSFERRIN: CPT

## 2024-10-16 PROCEDURE — 36415 COLL VENOUS BLD VENIPUNCTURE: CPT

## 2024-10-16 PROCEDURE — 86300 IMMUNOASSAY TUMOR CA 15-3: CPT

## 2024-10-16 PROCEDURE — 82728 ASSAY OF FERRITIN: CPT

## 2024-10-16 PROCEDURE — 85025 COMPLETE CBC W/AUTO DIFF WBC: CPT

## 2024-10-16 PROCEDURE — 83540 ASSAY OF IRON: CPT

## 2024-10-16 PROCEDURE — 80053 COMPREHEN METABOLIC PANEL: CPT

## 2024-10-16 PROCEDURE — 82378 CARCINOEMBRYONIC ANTIGEN: CPT

## 2024-10-17 LAB — CANCER AG27-29 SERPL-ACNC: 18.2 U/ML (ref 0–38.6)

## 2024-10-23 ENCOUNTER — OFFICE VISIT (OUTPATIENT)
Dept: ONCOLOGY | Facility: CLINIC | Age: 69
End: 2024-10-23
Payer: MEDICARE

## 2024-10-23 VITALS
OXYGEN SATURATION: 97 % | TEMPERATURE: 97.6 F | HEART RATE: 64 BPM | SYSTOLIC BLOOD PRESSURE: 132 MMHG | DIASTOLIC BLOOD PRESSURE: 74 MMHG | RESPIRATION RATE: 18 BRPM | HEIGHT: 64 IN | BODY MASS INDEX: 22.52 KG/M2 | WEIGHT: 131.9 LBS

## 2024-10-23 DIAGNOSIS — R97.8 OTHER ABNORMAL TUMOR MARKERS: ICD-10-CM

## 2024-10-23 DIAGNOSIS — D05.12 DUCTAL CARCINOMA IN SITU (DCIS) OF LEFT BREAST: Primary | ICD-10-CM

## 2025-03-21 ENCOUNTER — OFFICE VISIT (OUTPATIENT)
Age: 70
End: 2025-03-21

## 2025-03-21 ENCOUNTER — RESULTS FOLLOW-UP (OUTPATIENT)
Age: 70
End: 2025-03-21

## 2025-03-21 VITALS
WEIGHT: 137 LBS | DIASTOLIC BLOOD PRESSURE: 88 MMHG | SYSTOLIC BLOOD PRESSURE: 148 MMHG | HEIGHT: 64 IN | BODY MASS INDEX: 23.39 KG/M2 | RESPIRATION RATE: 20 BRPM | TEMPERATURE: 98.8 F | HEART RATE: 105 BPM | OXYGEN SATURATION: 98 %

## 2025-03-21 DIAGNOSIS — Z11.59 SCREENING FOR VIRAL DISEASE: ICD-10-CM

## 2025-03-21 DIAGNOSIS — J06.9 UPPER RESPIRATORY INFECTION WITH COUGH AND CONGESTION: Primary | ICD-10-CM

## 2025-03-21 DIAGNOSIS — R03.0 ELEVATED BLOOD PRESSURE READING: ICD-10-CM

## 2025-03-21 LAB
INFLUENZA A ANTIBODY: NORMAL
INFLUENZA B ANTIBODY: NORMAL
Lab: NORMAL
QC PASS/FAIL: NORMAL
SARS-COV-2, POC: NORMAL

## 2025-03-21 RX ORDER — BROMPHENIRAMINE MALEATE, PSEUDOEPHEDRINE HYDROCHLORIDE, AND DEXTROMETHORPHAN HYDROBROMIDE 2; 30; 10 MG/5ML; MG/5ML; MG/5ML
10 SYRUP ORAL 4 TIMES DAILY PRN
Qty: 200 ML | Refills: 0 | Status: SHIPPED | OUTPATIENT
Start: 2025-03-21 | End: 2025-03-26

## 2025-03-21 RX ORDER — GUAIFENESIN 600 MG/1
1200 TABLET, EXTENDED RELEASE ORAL 2 TIMES DAILY
Qty: 40 TABLET | Refills: 0 | Status: SHIPPED | OUTPATIENT
Start: 2025-03-21 | End: 2025-03-31

## 2025-03-21 ASSESSMENT — ENCOUNTER SYMPTOMS
SINUS PRESSURE: 0
COUGH: 1
DIARRHEA: 0
CHEST TIGHTNESS: 0
RHINORRHEA: 0
CONSTIPATION: 0
TROUBLE SWALLOWING: 0
COLOR CHANGE: 0
EYE DISCHARGE: 0
NAUSEA: 0
SORE THROAT: 0
ABDOMINAL PAIN: 0
EYE PAIN: 0
WHEEZING: 0
ABDOMINAL DISTENTION: 0
EYE ITCHING: 0
APNEA: 0
SHORTNESS OF BREATH: 0
SINUS PAIN: 0
VOMITING: 0

## 2025-03-21 NOTE — PROGRESS NOTES
medicine like Delsym/Robitussin if applicable.  - Tylenol/Motrin as needed for body aches/fevers unless contraindicated.  - Multivitamin is recommended to help boost the immune system.  - Drink plenty of water to stay hydrated.  - May use humidifier as needed while sleeping.  - Allow adequate rest.  - Encourage deep breathing exercises.  - Recommended staying home until fever free for at least 24 hours without medication and symptoms are improving.  - Return to the clinic or follow up with PCP if symptoms worsen or fail to improve.    High Blood Pressure Reading    - BP readings are considered elevated (greater than 130/80) during clinic visit.  - Initiate exercise (30 minute periods 3-4 times per week).  - Start DASH diet: vegetables, fruits, low-fat dairy, whole grains, poultry/fish.  - Monitor intake of salt, sweet, sugar-sweetened beverages and red meats.  - Limit intake of alcohol (men: no more than 2 drinks, women: no more than 1 drink daily).  - Keep a log by checking BP morning and night, take with you to f/u appointment.   - Make appointment with PCP for chronic maintenance and treatment of BP.      Electronically signed by ORLANDO Beltre CNP on 3/21/2025 at 4:19 PM

## 2025-03-21 NOTE — PATIENT INSTRUCTIONS
Upper Respiratory Infection     - Negative Flu and COVID test.  - Continue taking Claritin and Flonase at home.  - Mucinex sent to pharmacy; take as prescribed.  - Cough medicine sent to pharmacy; take as needed for cough.  - Medications such as Zyrtec or Claritin may help with symptoms.   - Nasal decongestant, such as Flonase/Afrin as needed.  - OTC cough medicine like Delsym/Robitussin if applicable.  - Tylenol/Motrin as needed for body aches/fevers unless contraindicated.  - Multivitamin is recommended to help boost the immune system.  - Drink plenty of water to stay hydrated.  - May use humidifier as needed while sleeping.  - Allow adequate rest.  - Encourage deep breathing exercises.  - Recommended staying home until fever free for at least 24 hours without medication and symptoms are improving.  - Return to the clinic or follow up with PCP if symptoms worsen or fail to improve.    High Blood Pressure Reading    - BP readings are considered elevated (greater than 130/80) during clinic visit.  - Initiate exercise (30 minute periods 3-4 times per week).  - Start DASH diet: vegetables, fruits, low-fat dairy, whole grains, poultry/fish.  - Monitor intake of salt, sweet, sugar-sweetened beverages and red meats.  - Limit intake of alcohol (men: no more than 2 drinks, women: no more than 1 drink daily).  - Keep a log by checking BP morning and night, take with you to f/u appointment.   - Make appointment with PCP for chronic maintenance and treatment of BP.

## 2025-04-15 NOTE — PROGRESS NOTES
MGW ONC [facility] GROUP HEMATOLOGY AND ONCOLOGY  [address]  [place] [address] [phone]    Patient Name: Evelia Kerns  Encounter Date:  04/23/2025  YOB: 1955  Patient Number: [id number]     REASON FOR VISIT:  Ms. Evelia Kerns is a 69-year-old female who returns in follow-up of carcinoma of the left breast, stage: IIB (pT2 pN1 N0 G4) ER 82%, RI 9%, HER-2/analisa +2.  She is seen ~ 236 months following the completion of adjuvant ACT (Adriamycin/ Cytoxan/Taxotere) chemotherapy. She completed 5 years of adjuvant Tamoxifen on 08/11/2010. 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 Alejandro. She received adjuvant Tamoxifen from 05/25/2018 through 07/13/2018 at which point she underwent bilateral nipple sparing mastectomies. She has since undergone placement of permanent breast implants bilaterally on 11/09/2018. The patient is here alone (usually with her spouse, Juvenal).      I have reviewed the HPI and verified with the patient the accuracy of it. No changes to interval history since the information was documented. Tommy Richmond MD 04/23/25      DIAGNOSTIC ABNORMALITIES: Ductal carcinoma-in-situ (DCIS)   Bilateral mammogram, 01/31/2018, [facility]. Comparison: 01/30/2017, 01/27/2016, 03/23/2015. Impression: Left breast calcifications for which additional imaging is recommended including CC and lateral magnification and 3D full lateral views. BI-RADS Category 0. No mammographic evidence of right breast malignancy.   Unilateral left diagnostic mammogram with CAD, 02/05/2018, [facility]. Comparison to screening mammogram of 01/31/2018. Impression: Indeterminate left breast calcifications for which stereotactic guided biopsy is recommended. BI-RADS Category 4.   Left breast core biopsies at  "1 o’clock position, 03/05/2018 showing focal atypical ductal hyperplasia (0.1 cm).  Diagnosis: Breast, left needle localized excisional biopsy (KIS- 18,- 1450, 13 slides), 04/05/2018: Intermediate grade ductal carcinoma in situ, solid type; scar and radiation change; previous biopsy.  ER reported, 04/05/2018: On same specimen 2 blocks - #1450-5: ER (+) 29%; #1450-3: ER (-) 0%. The latter discussed and clarified with Dr. Vinny Lomeli and Dr. Parisa Nagel of pathology who confirmed that these are from the same specimen but heterogeneously expressed ER.    PREVIOUS INTERVENTIONS: Ductal carcinoma-in-situ (DCIS)   Left breast, excisional biopsy with needle localization, 03/30/2018 (Dr. Alejandro). FINAL DIAGNOSIS: 1. A few scattered foci of intermediate grade ductal carcinoma in situ. 2. No invasive malignancy identified. 3. Changes of previous biopsy site including fat necrosis and cicatrix formation. 4. No ductal carcinoma in situ identified at the inked margins of surgical excision. AJCC stage: pTis (DCIS) pNX - ER reported, 04/05/2018: On same specimen 2 blocks - #1450-5: ER (+) 29%; #1450-3: ER (-) 0%  Office encounter with Dr. Alejandro on 04/30/2018: \"After a prolonged discussion lasting 60 minutes, we felt it was most appropriate that she undergo close observation and no surgical intervention at present. Her DCIS has not been sent for ER yet. We will make sure this is sent. We will also send for Prelude DX. She will call back in 2 weeks to discuss her ER/ME positivity or not. I will see her back in 5 months with a unilateral left mammogram.\"   Adjuvant Tamoxifen beginning 05/25/2018 through 07/13/2018.  Bilateral nipple sparing mastectomies, Dr. Alejandro. Followed by reconstruction by Dr. Mark, 07/13/2018. Final Diagnoses: A) Right Breast, Skin-Sparing Mastectomy: No evidence of malignancy. Mild benign fibrocystic changes. B) Left Breast, Skin-Sparing Mastectomy: Microscopic focus of residual ductal carcinoma in " situ, cribriform pattern, low nuclear grade. Greatest dimension of the residual ductal carcinoma in situ is 0.8 cm. Closest surgical margins is the posterior margin (deep margin) at 1.1 cm. No evidence of invasive carcinoma. Pathologic AJCC Classification: ypTis.     DIAGNOSTIC ABNORMALITIES: Infiltrating ductal carcinoma left breast   Mammography, 03/15/2005. A new area of developing nodularity with calcifications deep in the upper outer quadrant of the left breast. Biopsy was recommended.  Biopsy of left breast, 04/06/2006. Infiltrating ductal carcinoma, high-grade, strongly ER (82% positive), weakly OK (9% positive), and HER-2/analisa negative.    PREVIOUS INTERVENTIONS: Infiltrating ductal carcinoma left breast   Left breast lumpectomy with sentinel lymph node biopsies, 04/12/2005. A 2.2 cm tumor mass of infiltrating high-grade ductal carcinoma with a component of ductal carcinoma in situ, high-grade. 2 of 4 sentinel nodes positive.  Formal left axillary node dissection, 04/26/2005. Six additional nodes retrieved. All 6 negative for evidence of metastatic carcinoma.  Adjuvant Adriamycin, Cytoxan, and Taxotere. From 05/26/2005 through 08/04/2005. Six cycles completed.   Tamoxifen. From 08/11/2005 through 08/11/2010.  Radiation treatments to left breast, 09/12/2005 to 10/26/2005, 6040 cGy.        Problem List Items Addressed This Visit          Other    Ductal carcinoma in situ (DCIS) of left breast - Primary               Oncology/Hematology History   Ductal carcinoma in situ (DCIS) of left breast   12/15/2019 Initial Diagnosis    Ductal carcinoma in situ (DCIS) of left breast     12/15/2019 Cancer Staged    Staging form: Breast, AJCC 8th Edition  - Clinical stage from 12/15/2019: Stage 0 (cTis (DCIS), cN0, cM0, ER+, OK+, HER2-) - Signed by Tommy Richmond MD on 12/15/2019         PAST MEDICAL HISTORY:  ALLERGIES:  Allergies   Allergen Reactions    Adhesive Tape Rash     CURRENT MEDICATIONS:  Outpatient Encounter  Medications as of 4/23/2025   Medication Sig Dispense Refill    Calcium Carb-Cholecalciferol 500-400 MG-UNIT chewable tablet Chew.      fluticasone (FLONASE) 50 MCG/ACT nasal spray Administer 2 sprays into the nostril(s) as directed by provider Daily.      glucosamine-chondroitin 500-400 MG capsule capsule Take  by mouth 3 (Three) Times a Day With Meals.      loratadine (CLARITIN) 10 MG tablet Take 1 tablet by mouth Daily.      Multiple Vitamins-Minerals (CENTRUM ADULT 50+ MULTIGUMMIES PO) Take  by mouth.      psyllium (METAMUCIL) 58.6 % packet Take 1 packet by mouth Daily.      Sodium Fluoride 1.1 % cream Apply  to teeth.       No facility-administered encounter medications on file as of 4/23/2025.     ADULT ILLNESSES:   Dcis of the breast ( ICD-10:D05.12 ;Intraductal carcinoma in situ of left breast   Personal history of breast cancer ( ICD-10:Z85.3 ;Personal history of malignant neoplasm of breast   LINDA - Lorenzana's oesophagus ( ICD-10:K22.70 ;Lornezana's esophagus without dysplasia   Depression ( ICD-10:F32.9 ;Major depressive disorder, single episode, unspecified   Hypercholesterolemia ( ICD-10:E78.00 ;Pure hypercholesterolemia, unspecified   Immunodeficiency disorder (disorder) ( ICD-10:D84.9 ;Immunodeficiency, unspecified   Osteoarthritis knee ( degenerative joint disease of knees; ICD-10:M17.0 ;Bilateral primary osteoarthritis of knee )   Syncope and collapse ( vasomotor instability; ICD-10:R55 ;Syncope and collapse )    SURGERIES:   Upper GI endoscopy, 02/17/2016   Knee surgery, repeat, 1984   Knee surgery, 1980   Dissection procedure: Formal left axillary node dissection, 04/26/2005   Breast implantation, bilateral, 11/09/2018, Dr. Mark   Colonoscopy, 2006   Endoscopy, 2011   Lumpectomy of breast, left, with sentinel node biopsy, 04/12/2005   Colonoscopy, 02/17/2016: Per Dr. Park's note: ASC lymphoid follicle, TV x2 polypoid lesions that were not polyps/hyperplastic change, diverticulosis   Cone biopsy of  cervix, 11/2004   Endoscopy performed on 02/28/2019 at [facility]. - LA Grade B reflux esophagitis. - Esophageal mucosal changes secondary to established short-segment Lorenzana's disease. Biopsied. - Medium-sized hiatal hernia. - Normal stomach. - Normal examined duodenum.       ADULT ILLNESSES:  Patient Active Problem List   Diagnosis Code    History of Lorenzana's esophagus Z87.19    History of esophagitis Z87.19    History of breast cancer Z85.3    Ductal carcinoma in situ (DCIS) of left breast D05.12    Atypical ductal hyperplasia of left breast N60.92    History of thoracic surgery Z98.890    CHEK2-related breast cancer C50.919, Z15.02, Z15.89, Z15.09     SURGERIES:  Past Surgical History:   Procedure Laterality Date    ADENOIDECTOMY      BREAST AUGMENTATION Bilateral     BREAST BIOPSY Left 2005    BREAST LUMPECTOMY      2005    COLONOSCOPY  02/17/2016    Colon polyps; Diverticulosis; Repeat 3-5 years     COLONOSCOPY  02/26/2007    Dr. Matute-Repeat in 5 years     ENDOSCOPY  02/17/2016    Hiatal hernia, LA Grade C and Lorenzana's    ENDOSCOPY  10/03/2011    Hiatal hernia; Lorenzana's     ENDOSCOPY  02/22/2010    Dr. Matute-hiatal hernia; Lorenzana's     ENDOSCOPY  12/22/2009    Dr. Matute-Esophageal ulcers; gastritis; hiatal hernia-Repeat in 2 months     ENDOSCOPY N/A 2/28/2019    Procedure: ESOPHAGOGASTRODUODENOSCOPY WITH ANESTHESIA;  Surgeon: Sylvia Park MD;  Location: [facility] ENDOSCOPY;  Service: Gastroenterology    KNEE ARTHROPLASTY      MASTECTOMY Bilateral     TONSILLECTOMY       HEALTH MAINTENANCE ITEMS:  Health Maintenance Due   Topic Date Due    Pneumococcal Vaccine 50+ (1 of 1 - PCV) Never done    ZOSTER VACCINE (3 of 3) 07/27/2019    ANNUAL WELLNESS VISIT  12/09/2023    TDAP/TD VACCINES (2 - Td or Tdap) 03/02/2024    LIPID PANEL  03/07/2024    COVID-19 Vaccine (5 - 2024-25 season) 09/01/2024    DXA SCAN  07/17/2025       <no information>  Last Completed Colonoscopy            Needs Review        COLORECTAL CANCER SCREENING (COLONOSCOPY - Every 10 Years) Tentatively due on 8/14/2033 08/14/2023  Colonoscopy, Scan    07/21/2021  Cologuard - Stool, Per Rectum    02/17/2020  COLONOSCOPY (Done - UTD)    03/08/2019  COLONOSCOPY (Done - UTD)    01/19/2018  SmartData: WORKFLOW - QUALITY MEASUREMENT - EXCLUSION REASONS - COLORECTAL CANCER SCREENING NOT PERFORMED FOR MEDICAL REASONS     Only the first 5 history entries have been loaded, but more history exists.                        Immunization History   Administered Date(s) Administered    COVID-19 (MODERNA) 1st,2nd,3rd Dose Monovalent 03/04/2021, 04/01/2021     Last Completed Mammogram            Completed or No Longer Recommended       MAMMOGRAM  Discontinued        Frequency changed to Never automatically (Topic No Longer Applies)    02/17/2020  Done - UTD    03/08/2019  Done    02/05/2018  Mammo diagnostic digital tomosynthesis left w CAD    01/31/2018  Mammo Screening Digital Tomosynthesis Bilateral With CAD      Only the first 5 history entries have been loaded, but more history exists.                              FAMILY HISTORY:  Family History   Problem Relation Age of Onset    Stroke Father     Leukemia Mother     No Known Problems Brother     No Known Problems Sister     No Known Problems Sister     Breast cancer Paternal Aunt 60    No Known Problems Daughter     No Known Problems Son     No Known Problems Maternal Grandmother     No Known Problems Paternal Grandmother     No Known Problems Maternal Aunt     Breast cancer Other 48    Ovarian cancer Neg Hx     Uterine cancer Neg Hx     Colon cancer Neg Hx     Melanoma Neg Hx     Prostate cancer Neg Hx     BRCA 1/2 Neg Hx     Endometrial cancer Neg Hx      SOCIAL HISTORY:  Social History     Socioeconomic History    Marital status: Single   Tobacco Use    Smoking status: Former    Smokeless tobacco: Never   Substance and Sexual Activity    Alcohol use: Yes     Comment: Rarely    Drug use: No     "Sexual activity: Yes       REVIEW OF SYSTEMS:  This and that... \"I'm ok other than sinuses..\"  Constitutional:   The patient's appetite and energy are fairly good. \"I still feel pretty good.\" She manages all her ADLs and remains active.  Still volunteers for the [employer].  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 She has regained 5 lb (had lost 5 lb at her prior visit) since her last visit. She has no fevers, chills, or drenching night sweats. Her sleep habits are erratic.  Ear/Nose/Mouth/Throat:   She reports no ear pains, sinus symptoms, sore throat, nosebleeds, or sore tongue. She reports no headaches. She reports no hoarseness, change in voice quality.  Ocular:   She reports no eye pain, significant change in visual acuity, double vision, or blurry vision.  Respiratory:   She reports no chronic cough, significant shortness of breathing, phlegm production, or unexplained chest wall pain.  Cardiovascular:   She reports no exertional chest pain, chest pressure, or chest heaviness. She reports no claudication. She reports no palpitations or symptomatic orthostasis.  Gastrointestinal:   No dysphagia, nausea, vomiting, postprandial abdominal pain, bloating, cramping, change in bowel habits, or discoloration of the stool. She reports no rectal bleeding. She reports no constipation or diarrhea. Had EGD and c-scope, 8/14/23-large 9 cm hiatal hernia at the GE junction at 31 cm.  Abnormal mucosal changes suggestive of gastritis.  Gastric biopsies obtained from the antrum and body to rule out H. pylori infection.  If symptomatic consider hiatal hernia repair.  Terminal ileum appeared normal.  Colonic mucosa appeared normal throughout the entire examined colon.  Retroflexion in the rectum normal and revealed no further abnormalities.  Repeat colonoscopy in 5 years due to history of polyps.  Genitourinary:   She reports no urinary burning, frequency, dribbling, or discoloration. She reports no need to urinate " "frequently through the night. She reports no difficulty controlling her bladder.  GYN:   She reports no unexplained vaginal bleeding and no significant vaginal discharge.  Breasts:   She reports no breast discomfort since the implant placements.   Musculoskeletal:   She has no unexplained arthralgias, myalgias, or nighttime leg cramping.  Extremities:   She reports no trouble with fluid retention or significant leg swelling.  Heme/Lymphatic:   She reports no unexplained bleeding, bruising, petechial rash, or swollen glands.  Skin:   She reports no itching, rashes, or lesions which won't heal.  Neuro:   She reports no loss of consciousness, seizures, fainting spells, or dizziness. She reports no weakness of her face, arms, or legs. She reports no difficulty with speech. She has no tremors.  Psych:   She seems generally satisfied with life. She reports no depression. She reports no mood swings.      VITAL SIGNS: /68   Pulse 87   Temp 98.1 °F (36.7 °C) (Temporal)   Resp 18   Ht 162.6 cm (64\")   Wt 62 kg (136 lb 11.2 oz)   LMP 01/19/2010 (Approximate)   SpO2 98%   BMI 23.46 kg/m² Body surface area is 1.66 meters squared.  Pain Score    04/23/25 0839   PainSc: 0-No pain           PHYSICAL EXAMINATION:   General:   She is a pleasant, cooperative, well-developed, well-nourished, and modestly-kept middle aged female in no distress. She arrived in the exam room ambulatory. She appears to be her stated age. Her skin color is normal. ECOG PS = 0.  Head/Neck:   The patient is anicteric and atraumatic. The trachea is midline. The neck is supple without evidence of jugular venous distention or cervical adenopathy.  Eyes:   The pupils are equal, round, and reactive to light. The extraocular movements are full. There is no scleral jaundice or erythema.  Chest:   The respiratory efforts are normal and unhindered. The chest is clear to auscultation. There are no wheezes, rhonchi, rales, or asymmetry of breath " sounds.  Breasts: Chaperoned exam-Flor Quintanilla MA  The right breast is notable for an implant. No axillary adenopathy. The left breast is notable for an implant. No axillary adenopathy.  No supraclavicular adenopathy bilaterally.  No cervical adenopathy bilaterally.  Cardiovascular:   The patient has a regular cardiac rate and rhythm without murmurs, rubs, or gallops. The peripheral pulses are equal and full.  Extremities:   There is no evidence of cyanosis, clubbing, or edema.   Cutaneous:   She has no other overt rashes, disseminated lesions, purpura, or petechiae.  Lymphatics:   There is no evidence of adenopathy in the cervical, supraclavicular, axillary areas areas.   Neurologic:   The patient is alert, oriented, cooperative, and pleasant. She is appropriately conversant. She ambulated into the exam room without assistance and transferred from chair to exam table unaided. There is no overt dysfunction of the motor, sensory, or cerebellar systems.  Psych:   Mood and affect are appropriate for circumstance. Eye contact is appropriate.        LABS      ASSESSMENT:   1. Ductal carcinoma in situ (DCIS):   Stage: AJCC Stage: pTis (DCIS) pNX. ER, 04/05/2018: On same specimen 2 blocks - #1450-5: ER (+) 29%; #1450-3: ER (-) 0%.   Tumor [place]: Left breast, excisional biopsy with needle localization, 03/30/2018 (Dr. Alejandro). FINAL DIAGNOSIS: 1. A few scattered foci of intermediate grade ductal carcinoma in situ. 2. No invasive malignancy identified. 3. Changes of previous biopsy site including fat necrosis and cicatrix formation. 4. No ductal carcinoma in situ identified at the inked margins of surgical excision   Previously discussed that on biopsy of 03/30/2018, ER reported, 04/05/2018: On same specimen 2 blocks - #1450-5: ER (+) 29%; #1450-3: ER (-) 0%. The latter discussed and clarified with Dr. Vinny Lomeli and Dr. Parisa Nagel of pathology who confirmed that these are from the same specimen but heterogeneously  expressed ER.   Tumor Status following excisional biopsy, 03/30/2018 and bilateral nipple sparing mastectomies, 07/13/2018: ALFREDO  2. Personal history of carcinoma of the left breast, grade 3 infiltrating ductal carcinoma:   Stage: IIB (pT2 pN1 N0 G4) ER 82%, AZ 9%, HER-2/analisa +2.   Baseline Node Status: 2 of 4 sentinel nodes positive.   Tumor [place]: No evidence of malignancy.   Tumor Status: Mammogram, bilateral, 01/30/2017 ([facility]) Comparison: 01/27/2016, 03/23/2015, 02/19/2013,01/21/2011. No mammographic evidence of malignancy. The patient should return for screening mammography in 12 months or sooner, if clinically indicated.   Prognosis: Good.  3. Mild normocytic anemia.  Evidence for iron deficiency.    --Resolved: Hgb 13.5; MCV 92.1, 4/16/24 (prior:  Hgb 11.3-13.7)  --6/20/23- iron 48, fe sat 9%, ferritin 11  --8/14/23- EGD and c-scope- Large 9 cm hiatal hernia at the GE junction at 31 cm.  Abnormal mucosal changes suggestive of gastritis.  Gastric biopsies obtained from the antrum and body to rule out H. pylori infection.  If symptomatic consider hiatal hernia repair.  Terminal ileum appeared normal.  Colonic mucosa appeared normal throughout the entire examined colon.  Retroflexion in the rectum normal and revealed no further abnormalities.  Repeat colonoscopy in 5 years due to history of polyps.  --Ferrous sulfate since 6/27/23  --4/16/24-iron 75, fe sat 18%, ferritin 42 (prior: 11)  4. Osteopenia  --7/17/23- DEXA scan-Osteopenia. Low bone mass.The bone density is between 1.0 and 2.5 standard deviations below the mean for a young adult patient. There is increased risk of fracture in this patient. Slightly increased lumbar spine bone density and slightly decreased left hip bone density compared with 2 years ago. Overall relatively stable bone density values.  5.  Hypercholesterolemia. Diet controlled.  Followed by pcp (Dr. Rivera)  5. 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   7.  "History of depression.   8. Self-directed. Had previously declined scheduled repeat EGD per Dr. Park.  Has been missing follow-up appointments.  a. Letter from Dr. Park (patient missed EGD). On 11/20/2014, we called the patient to let her know that we received a letter from GI (Dr. Park) noting patient declined repeat EGD Had previously explained to the patient of the letter advising repeat EGD for Lorenzana's esophagus. Had strongly encouraged the patient to follow through. She had stated \"I don't think it is necessary and I have already called Dr. Park's office telling them so and I don't see the need in this.\"   b. Discussed again at her visit on 02/04/2015. She replies that \"I'm doing fine and I don't want to be pressured into this. I'll discuss this with somebody else before deciding.\"   c. Discussed again at her visit last 08/05/2015. She said, \"I don't want to be probed or prodded unless I need to be.\"   d. Upper GI endoscopy, 02/17/2016 ([facility]), - Small hiatus hernia - LA Grade C reflux esophagitis. Esophageal mucosal changes secondary to established short-segment Lorenzana's disease. Biopsied. Normal stomach. Normal examined duodenum.   e. Endoscopy performed on 02/28/2019 at [facility]. - LA Grade B reflux esophagitis. - Esophageal mucosal changes secondary to established short-segment Lorenzana's disease. Biopsied. - Medium-sized hiatal hernia. - Normal stomach. - Normal examined duodenum.  f.  EGD and c-scope, 8/14/23 (Dr. Ku)-large 9 cm hiatal hernia at the GE junction at 31 cm.  Abnormal mucosal changes suggestive of gastritis.  Gastric biopsies obtained from the antrum and body to rule out H. pylori infection.  If symptomatic consider hiatal hernia repair.  Terminal ileum appeared normal.  Colonic mucosa appeared normal throughout the entire examined colon.  Retroflexion in the rectum normal and revealed no further abnormalities.  Repeat colonoscopy in 5 years due to history of " "polyps.    PLAN:  1.  Apprised of labs from 4/16/24 -10/16/24 -4/16/2025-with Hgb 13.5 otherwise normal CBC, glucose 121, otherwise normal CMP, iron 82, fe sat 20%, ferritin 79 (prior: 88); CEA 3.06 (1.98- 3.27), CA 27-29 22.4 (prior peak: 24.3).     2.  Diagnostic information previously discussed. Previously reviewed imaging studies (bilateral mammogram, 01/31/2018 and unilateral left diagnostic mammogram, 02/05/2018), path from left breast core biopsies at 1 o’clock position, 03/05/3018 showing focal atypical ductal hyperplasia (0.1 cm), then needle localization and excisional biopsy of left breast mass, 03/30/2018 and office encounters from Dr. Alejandro, most recently on 04/30/2018. \"After a prolonged discussion lasting 60 minutes, we felt it was most appropriate that she undergo close observation and no surgical intervention at present. Her DCIS has not been sent for ER yet. We will make sure this is sent. We will also send for Prelude DX. She will call back in 2 weeks to discuss her ER/[place] positivity or not. I will see her back in 5 months with a unilateral left mammogram.\" I discussed the options (see #3 following). Underwent bilateral mastectomies on 07/13/2018 (above).   3.  Ductal carcinoma in situ (DCIS) previously discussed. I had noted that many, but not all cases of DCIS progress to invasive carcinoma within a woman's lifetime. The available therapies were reviewed:  a) Mastectomy is curative 98-99% of the time (Given the information available, I feel that this is the procedure the patient will benefit the most from, and the procedure she favors anyway).   b) Localized DCIS may be treated with breast sparing surgery and irradiation (recurrence rate = 9-17%).   c) Excision alone may be considered in those with less than 1-2 cm low grade lesions.   d) Tamoxifen should be considered to reduce (from 13% to 8%) the risk of ipsilateral breast recurrence after breast sparing surgery, and to reduce the risk of " contralateral breast cancer in all women.   4.  Previously acknowledged her self-directed approach.     5.  Rx: Ferrous sulfate 325 po qod # 30 x 4 RF     6.  Continue management per primary care and other specialists.   7.  Return to office in 24 weeks - does not want to extend beyond this period - with pre-office CBC with differential, iron, fe sat, ferritin, CEA, CA27-29, and CMP.       I spent ~ 34 minutes caring for Evelia on this date of service. This time includes time spent by me in the following activities: preparing for the visit, reviewing tests, performing a medically appropriate examination and/or evaluation, counseling and educating the patient/family/caregiver, ordering medications, tests, or procedures and documenting information in the medical record      cc: MD Frandy Cummings MD Pamela Reed, MD

## 2025-04-16 ENCOUNTER — LAB (OUTPATIENT)
Dept: LAB | Facility: HOSPITAL | Age: 70
End: 2025-04-16
Payer: MEDICARE

## 2025-04-16 DIAGNOSIS — R97.8 OTHER ABNORMAL TUMOR MARKERS: ICD-10-CM

## 2025-04-16 DIAGNOSIS — D05.12 DUCTAL CARCINOMA IN SITU (DCIS) OF LEFT BREAST: ICD-10-CM

## 2025-04-16 LAB
ALBUMIN SERPL-MCNC: 4.4 G/DL (ref 3.5–5.2)
ALBUMIN/GLOB SERPL: 1.8 G/DL
ALP SERPL-CCNC: 112 U/L (ref 39–117)
ALT SERPL W P-5'-P-CCNC: 14 U/L (ref 1–33)
ANION GAP SERPL CALCULATED.3IONS-SCNC: 8 MMOL/L (ref 5–15)
AST SERPL-CCNC: 19 U/L (ref 1–32)
BASOPHILS # BLD AUTO: 0.06 10*3/MM3 (ref 0–0.2)
BASOPHILS NFR BLD AUTO: 1.4 % (ref 0–1.5)
BILIRUB SERPL-MCNC: 0.5 MG/DL (ref 0–1.2)
BUN SERPL-MCNC: 13 MG/DL (ref 8–23)
BUN/CREAT SERPL: 18.6 (ref 7–25)
CALCIUM SPEC-SCNC: 9.2 MG/DL (ref 8.6–10.5)
CEA SERPL-MCNC: 3.06 NG/ML
CHLORIDE SERPL-SCNC: 103 MMOL/L (ref 98–107)
CO2 SERPL-SCNC: 26 MMOL/L (ref 22–29)
CREAT SERPL-MCNC: 0.7 MG/DL (ref 0.57–1)
DEPRECATED RDW RBC AUTO: 41.2 FL (ref 37–54)
EGFRCR SERPLBLD CKD-EPI 2021: 94.3 ML/MIN/1.73
EOSINOPHIL # BLD AUTO: 0.11 10*3/MM3 (ref 0–0.4)
EOSINOPHIL NFR BLD AUTO: 2.5 % (ref 0.3–6.2)
ERYTHROCYTE [DISTWIDTH] IN BLOOD BY AUTOMATED COUNT: 12.3 % (ref 12.3–15.4)
FERRITIN SERPL-MCNC: 79.35 NG/ML (ref 13–150)
GLOBULIN UR ELPH-MCNC: 2.4 GM/DL
GLUCOSE SERPL-MCNC: 121 MG/DL (ref 65–99)
HCT VFR BLD AUTO: 41.9 % (ref 34–46.6)
HGB BLD-MCNC: 13.5 G/DL (ref 12–15.9)
IMM GRANULOCYTES # BLD AUTO: 0.02 10*3/MM3 (ref 0–0.05)
IMM GRANULOCYTES NFR BLD AUTO: 0.5 % (ref 0–0.5)
IRON 24H UR-MRATE: 82 MCG/DL (ref 37–145)
IRON SATN MFR SERPL: 20 % (ref 20–50)
LYMPHOCYTES # BLD AUTO: 1.26 10*3/MM3 (ref 0.7–3.1)
LYMPHOCYTES NFR BLD AUTO: 28.9 % (ref 19.6–45.3)
MCH RBC QN AUTO: 29.7 PG (ref 26.6–33)
MCHC RBC AUTO-ENTMCNC: 32.2 G/DL (ref 31.5–35.7)
MCV RBC AUTO: 92.1 FL (ref 79–97)
MONOCYTES # BLD AUTO: 0.38 10*3/MM3 (ref 0.1–0.9)
MONOCYTES NFR BLD AUTO: 8.7 % (ref 5–12)
NEUTROPHILS NFR BLD AUTO: 2.53 10*3/MM3 (ref 1.7–7)
NEUTROPHILS NFR BLD AUTO: 58 % (ref 42.7–76)
NRBC BLD AUTO-RTO: 0 /100 WBC (ref 0–0.2)
PLATELET # BLD AUTO: 211 10*3/MM3 (ref 140–450)
PMV BLD AUTO: 11.2 FL (ref 6–12)
POTASSIUM SERPL-SCNC: 4.3 MMOL/L (ref 3.5–5.2)
PROT SERPL-MCNC: 6.8 G/DL (ref 6–8.5)
RBC # BLD AUTO: 4.55 10*6/MM3 (ref 3.77–5.28)
SODIUM SERPL-SCNC: 137 MMOL/L (ref 136–145)
TIBC SERPL-MCNC: 411 MCG/DL (ref 298–536)
TRANSFERRIN SERPL-MCNC: 276 MG/DL (ref 200–360)
WBC NRBC COR # BLD AUTO: 4.36 10*3/MM3 (ref 3.4–10.8)

## 2025-04-16 PROCEDURE — 80053 COMPREHEN METABOLIC PANEL: CPT

## 2025-04-16 PROCEDURE — 83540 ASSAY OF IRON: CPT

## 2025-04-16 PROCEDURE — 85025 COMPLETE CBC W/AUTO DIFF WBC: CPT

## 2025-04-16 PROCEDURE — 84466 ASSAY OF TRANSFERRIN: CPT

## 2025-04-16 PROCEDURE — 36415 COLL VENOUS BLD VENIPUNCTURE: CPT

## 2025-04-16 PROCEDURE — 86300 IMMUNOASSAY TUMOR CA 15-3: CPT

## 2025-04-16 PROCEDURE — 82378 CARCINOEMBRYONIC ANTIGEN: CPT

## 2025-04-16 PROCEDURE — 82728 ASSAY OF FERRITIN: CPT

## 2025-04-17 LAB — CANCER AG27-29 SERPL-ACNC: 22.4 U/ML (ref 0–38.6)

## 2025-04-23 ENCOUNTER — OFFICE VISIT (OUTPATIENT)
Dept: ONCOLOGY | Facility: CLINIC | Age: 70
End: 2025-04-23
Payer: MEDICARE

## 2025-04-23 VITALS
OXYGEN SATURATION: 98 % | WEIGHT: 136.7 LBS | HEART RATE: 87 BPM | DIASTOLIC BLOOD PRESSURE: 68 MMHG | TEMPERATURE: 98.1 F | HEIGHT: 64 IN | BODY MASS INDEX: 23.34 KG/M2 | SYSTOLIC BLOOD PRESSURE: 118 MMHG | RESPIRATION RATE: 18 BRPM

## 2025-04-23 DIAGNOSIS — R97.8 OTHER ABNORMAL TUMOR MARKERS: ICD-10-CM

## 2025-04-23 DIAGNOSIS — D05.12 DUCTAL CARCINOMA IN SITU (DCIS) OF LEFT BREAST: Primary | ICD-10-CM

## 2025-04-23 RX ORDER — FERROUS SULFATE 325(65) MG
325 TABLET ORAL DAILY
Qty: 30 TABLET | Refills: 4 | Status: SHIPPED | OUTPATIENT
Start: 2025-04-23

## 2025-05-09 ENCOUNTER — DOCUMENTATION (OUTPATIENT)
Dept: PASTORAL CARE | Facility: HOSPITAL | Age: 70
End: 2025-05-09
Payer: MEDICARE

## 2025-05-09 NOTE — ACP (ADVANCE CARE PLANNING)
Date of [facility] ACP conversation: 5/9/2025.  Location of conversation: [facility] L[facility] the [facility].  ACP facilitator: Johan Perez.  Referral source: Patient Access Staff.  Time spent in conversation and documentation: drop down [id number]    DOCUMENTS COMPLETED:    Living Will Directive: YES    CHOOSING A HEALTHCARE SURROGATE/DECISION MAKER:   Reviewed the qualities of healthcare surrogate: YES   Name of healthcare surrogate, if chosen: Jeovany Kerns Jr., Ирина Farooq,               Danni Rosenbaum.    Relationship to patient: Brother, and two sisters..    SUMMARY OF CONVERSATION:   Evelia expressed concern over some of the language in the directive, especially in regard to the specifics concerning the ventilator and feeding tube.  She chose to allow her brother and two sisters decide what is best for her at any end-of-life decision making.  I encouraged her to speak with them about her particular wishes should that ever come to pass for her.       I notarized the directive, gave her three copies and the original.  I faxed a copy to HIM.

## 2025-06-25 ENCOUNTER — TELEPHONE (OUTPATIENT)
Dept: ONCOLOGY | Facility: CLINIC | Age: 70
End: 2025-06-25
Payer: MEDICARE

## 2025-06-25 NOTE — TELEPHONE ENCOUNTER
Caller: Evelia Kerns    Relationship: Self    Best call back number: [phone]    What is the medical concern/diagnosis: HX OF HEART DISEASE     What specialty or service is being requested: CARDIOLOGY     What is the provider, practice or medical service name: DR. SANIYA BEJARANO     What is the office location:      What is the office phone number: [phone]    Any additional details: PT WAS AT A  LAST WEEK & FOUND OUT TO FAMILY MEMBERS HAVE HEART DISEASE BUT HAD A QUADRUPLE BYPASS & OTHER HAS STENTS PLUS HER FATHER HAD A PACEMAKER SO WANTING TO SEE A CARDIOLOGIST

## 2025-06-26 NOTE — TELEPHONE ENCOUNTER
Left detailed message for Evelia that her PCP should be the one to make the referral to cardiology. She is to call them to request. She can call back with any questions or concerns.

## (undated) DEVICE — SENSR O2 OXIMAX FNGR A/ 18IN NONSTR

## (undated) DEVICE — SUTURE ETHLN SZ 2-0 L30IN NONABSORBABLE BLK L36MM FSLX 3/8 1674H

## (undated) DEVICE — STOPCOCK 3 WAY SPIN MALE LL CLR

## (undated) DEVICE — BITE BLOCK ENDOSCP AD 60 FR W/ ADJ STRP PLAS GRN BLOX

## (undated) DEVICE — 3M™ IOBAN™ 2 ANTIMICROBIAL INCISE DRAPE 6651EZ: Brand: IOBAN™ 2

## (undated) DEVICE — RETRACTOR SURG WIDE FLAT LO PROF LTWT PHOTONGUIDE

## (undated) DEVICE — SYSTEM SKIN CLSR 22CM DERMBND PRINEO

## (undated) DEVICE — PACK,UNIVERSAL,NO GOWNS: Brand: MEDLINE

## (undated) DEVICE — YANKAUER,OPEN TIP,W/O VENT,STERILE: Brand: MEDLINE INDUSTRIES, INC.

## (undated) DEVICE — CONMED SCOPE SAVER BITE BLOCK, 20X27 MM: Brand: SCOPE SAVER

## (undated) DEVICE — GOWN,PREVENTION PLUS,2XL,ST,22/CS: Brand: MEDLINE

## (undated) DEVICE — COLON KIT WITH 1.1 OZ ORCA HYDRA SEAL 2 GOWN

## (undated) DEVICE — GLOVE SURG SZ 75 CRM LTX FREE POLYISOPRENE POLYMER BEAD ANTI

## (undated) DEVICE — THREE QUARTER SHEET: Brand: CONVERTORS

## (undated) DEVICE — MINOR CDS: Brand: MEDLINE INDUSTRIES, INC.

## (undated) DEVICE — ELECTRODE BLDE L4IN NONINSULATED EDGE

## (undated) DEVICE — PITCHER PT 1200ML W HNDL CSR WRP

## (undated) DEVICE — SOLUTION IV IRRIG POUR BRL 0.9% SODIUM CHL 2F7124

## (undated) DEVICE — GAUZE,SPONGE,FLUFF,6"X6.75",STRL,10/TRAY: Brand: MEDLINE

## (undated) DEVICE — STERILE LATEX POWDER-FREE SURGICAL GLOVESWITH NITRILE COATING: Brand: PROTEXIS

## (undated) DEVICE — SUTURE VCRL SZ 3-0 L36IN ABSRB UD L36MM CT-1 1/2 CIR J944H

## (undated) DEVICE — TBG SMPL FLTR LINE NASL 02/C02 A/ BX/100

## (undated) DEVICE — SINGLE PORT MANIFOLD: Brand: NEPTUNE 2

## (undated) DEVICE — JACKSON-PRATT 100CC BULB RESERVOIR: Brand: CARDINAL HEALTH

## (undated) DEVICE — SUTURE VCRL SZ 2-0 L27IN ABSRB VLT L26MM CT-2 1/2 CIR J333H

## (undated) DEVICE — ULTRACLEAN ACCESSORY ELECTRODE 4" (10.16 CM) COATED BLADE WITH EXTENDED INSULATION: Brand: ULTRACLEAN

## (undated) DEVICE — CANNULA NSL AD L7FT DIV O2 CO2 W/ M LUERLOCK TRMPT CONN

## (undated) DEVICE — SUTURE MCRYL SZ 4-0 L18IN ABSRB UD L19MM PS-2 3/8 CIR PRIM Y496G

## (undated) DEVICE — SEALER ENDOSCP NANO COAT OPN DIV CRV L JAW LIGASURE IMPACT

## (undated) DEVICE — Device

## (undated) DEVICE — ADHESIVE SKIN CLSR 0.7ML TOP DERMBND ADV

## (undated) DEVICE — CLIP INT M L GRN TI TRNSVRS GRV CHEVRON SHP W/ PRECIS TIP

## (undated) DEVICE — KIT PROC 1 ICG VI AQ SOLVNT DRP SPY ELITE

## (undated) DEVICE — 60 ML SYRINGE LUER-LOCK TIP: Brand: MONOJECT

## (undated) DEVICE — Device: Brand: DEFENDO AIR/WATER/SUCTION AND BIOPSY VALVE

## (undated) DEVICE — YANKAUER,BULB TIP WITH VENT: Brand: ARGYLE

## (undated) DEVICE — BRA SURG L FOR 38-40IN CHST 96% COT 4% SPANDEX KNIT FAB

## (undated) DEVICE — DRESSING GERM DIA1IN CNTR H DIA7MM BLU CHG ANTIMIC PROTCT

## (undated) DEVICE — THE CHANNEL CLEANING BRUSH IS A NYLON FLEXI BRUSH ATTACHED TO A FLEXIBLE PLASTIC SHEATH DESIGNED TO SAFELY REMOVE DEBRIS FROM FLEXIBLE ENDOSCOPES.

## (undated) DEVICE — SUTURE VCRL SZ 3-0 L27IN ABSRB UD L26MM SH 1/2 CIR J416H

## (undated) DEVICE — FORCEPS BX 240CM 2.4MM L NDL RAD JAW 4 M00513334

## (undated) DEVICE — SUTURE VCRL SZ 2-0 L36IN ABSRB UD L36MM CT-1 1/2 CIR J945H

## (undated) DEVICE — GLOVE SURG SZ 85 L12IN FNGR ORTHO 126MIL CRM LTX FREE

## (undated) DEVICE — ENDOGATOR AUXILIARY WATER JET CONNECTOR: Brand: ENDOGATOR

## (undated) DEVICE — STAPLER SKIN L39MM DIA0.53MM CRWN 5.7MM S STL FIX HD PROX

## (undated) DEVICE — 3M™ IOBAN™ 2 ANTIMICROBIAL INCISE DRAPE 6650EZ: Brand: IOBAN™ 2

## (undated) DEVICE — TELFA NON-ADHERENT ABSORBENT DRESSING: Brand: TELFA

## (undated) DEVICE — BRUSH ENDOSCP 2 END CHN HEDGEHOG

## (undated) DEVICE — PAD,NON-ADHERENT,3X8,STERILE,LF,1/PK: Brand: MEDLINE

## (undated) DEVICE — JP CHAN DRN SIL HUBLESS 15FR W/TRO: Brand: CARDINAL HEALTH

## (undated) DEVICE — SUTURE MNCRYL 0 UNDYED CT1 Y496H

## (undated) DEVICE — SEALER TISS L20CM DIA13MM ADV BPLR L CRV JAW OPN APPRCH

## (undated) DEVICE — SUTURE PERMAHAND SZ 2-0 L18IN NONABSORBABLE BLK L26MM FS 685G

## (undated) DEVICE — 3M™ WARMING BLANKET, LOWER BODY, 10 PER CASE, 42568: Brand: BAIR HUGGER™

## (undated) DEVICE — SPONGE ENDOSCP CLN BS INF PREVENTION KOALA

## (undated) DEVICE — ADAPTER CLEANING PORPOISE CLEANING

## (undated) DEVICE — BLADE SURG NO10 C STL DISP ST

## (undated) DEVICE — 3M™ TEGADERM™ TRANSPARENT FILM DRESSING FRAME STYLE, 1628, 6 IN X 8 IN (15 CM X 20 CM), 10/CT 8CT/CASE: Brand: 3M™ TEGADERM™

## (undated) DEVICE — GOWN,PREVENTION PLUS,XL,ST,24/CS: Brand: MEDLINE

## (undated) DEVICE — ASTOUND STANDARD SURGICAL GOWN, XXL: Brand: CONVERTORS

## (undated) DEVICE — CUFF,BP,DISP,1 TUBE,ADULT,HP: Brand: MEDLINE

## (undated) DEVICE — MAJOR CDS

## (undated) DEVICE — SYSTEM IMPL DEL FOR BRST IMPL FUN (SEE COMMENT)